# Patient Record
Sex: FEMALE | Race: WHITE | NOT HISPANIC OR LATINO | Employment: FULL TIME | URBAN - METROPOLITAN AREA
[De-identification: names, ages, dates, MRNs, and addresses within clinical notes are randomized per-mention and may not be internally consistent; named-entity substitution may affect disease eponyms.]

---

## 2018-09-27 ENCOUNTER — APPOINTMENT (OUTPATIENT)
Dept: LAB | Facility: MEDICAL CENTER | Age: 63
End: 2018-09-27
Payer: COMMERCIAL

## 2018-09-27 ENCOUNTER — TRANSCRIBE ORDERS (OUTPATIENT)
Dept: ADMINISTRATIVE | Facility: HOSPITAL | Age: 63
End: 2018-09-27

## 2018-09-27 ENCOUNTER — HOSPITAL ENCOUNTER (OUTPATIENT)
Dept: RADIOLOGY | Facility: MEDICAL CENTER | Age: 63
Discharge: HOME | End: 2018-09-27
Payer: COMMERCIAL

## 2018-09-27 DIAGNOSIS — R63.5 ABNORMAL WEIGHT GAIN: ICD-10-CM

## 2018-09-27 DIAGNOSIS — R05.9 COUGH: Primary | ICD-10-CM

## 2018-09-27 DIAGNOSIS — R05.9 COUGH: ICD-10-CM

## 2018-09-27 DIAGNOSIS — R63.4 LOSS OF WEIGHT: Primary | ICD-10-CM

## 2018-09-27 LAB
ALBUMIN SERPL BCP-MCNC: 3.9 G/DL (ref 3.5–5)
ALP SERPL-CCNC: 87 U/L (ref 46–116)
ALT SERPL W P-5'-P-CCNC: 84 U/L (ref 12–78)
ANION GAP SERPL CALCULATED.3IONS-SCNC: 2 MMOL/L (ref 4–13)
AST SERPL W P-5'-P-CCNC: 65 U/L (ref 5–45)
BILIRUB SERPL-MCNC: 0.4 MG/DL (ref 0.2–1)
BUN SERPL-MCNC: 15 MG/DL (ref 5–25)
CALCIUM SERPL-MCNC: 9.4 MG/DL (ref 8.3–10.1)
CHLORIDE SERPL-SCNC: 104 MMOL/L (ref 100–108)
CO2 SERPL-SCNC: 30 MMOL/L (ref 21–32)
CREAT SERPL-MCNC: 0.75 MG/DL (ref 0.6–1.3)
ERYTHROCYTE [DISTWIDTH] IN BLOOD BY AUTOMATED COUNT: 14.4 % (ref 11.6–15.1)
ERYTHROCYTE [SEDIMENTATION RATE] IN BLOOD: 16 MM/HOUR (ref 0–20)
EST. AVERAGE GLUCOSE BLD GHB EST-MCNC: 126 MG/DL
GFR SERPL CREATININE-BSD FRML MDRD: 86 ML/MIN/1.73SQ M
GLUCOSE P FAST SERPL-MCNC: 104 MG/DL (ref 65–99)
HBA1C MFR BLD: 6 % (ref 4.2–6.3)
HCT VFR BLD AUTO: 46.3 % (ref 34.8–46.1)
HGB BLD-MCNC: 15.3 G/DL (ref 11.5–15.4)
LDH SERPL-CCNC: 281 U/L (ref 81–234)
MCH RBC QN AUTO: 29.6 PG (ref 26.8–34.3)
MCHC RBC AUTO-ENTMCNC: 33 G/DL (ref 31.4–37.4)
MCV RBC AUTO: 90 FL (ref 82–98)
PLATELET # BLD AUTO: 226 THOUSANDS/UL (ref 149–390)
PMV BLD AUTO: 10.8 FL (ref 8.9–12.7)
POTASSIUM SERPL-SCNC: 5.4 MMOL/L (ref 3.5–5.3)
PROT SERPL-MCNC: 7.7 G/DL (ref 6.4–8.2)
RBC # BLD AUTO: 5.17 MILLION/UL (ref 3.81–5.12)
SODIUM SERPL-SCNC: 136 MMOL/L (ref 136–145)
WBC # BLD AUTO: 4.61 THOUSAND/UL (ref 4.31–10.16)

## 2018-09-27 PROCEDURE — 85027 COMPLETE CBC AUTOMATED: CPT | Performed by: INTERNAL MEDICINE

## 2018-09-27 PROCEDURE — 36415 COLL VENOUS BLD VENIPUNCTURE: CPT | Performed by: INTERNAL MEDICINE

## 2018-09-27 PROCEDURE — 83615 LACTATE (LD) (LDH) ENZYME: CPT | Performed by: INTERNAL MEDICINE

## 2018-09-27 PROCEDURE — 85652 RBC SED RATE AUTOMATED: CPT | Performed by: INTERNAL MEDICINE

## 2018-09-27 PROCEDURE — 71250 CT THORAX DX C-: CPT

## 2018-09-27 PROCEDURE — 83036 HEMOGLOBIN GLYCOSYLATED A1C: CPT | Performed by: INTERNAL MEDICINE

## 2018-09-27 PROCEDURE — 80053 COMPREHEN METABOLIC PANEL: CPT | Performed by: INTERNAL MEDICINE

## 2018-09-27 PROCEDURE — 82232 ASSAY OF BETA-2 PROTEIN: CPT | Performed by: INTERNAL MEDICINE

## 2018-09-28 LAB — B2 MICROGLOB SERPL-MCNC: 2.2 MG/L (ref 0.6–2.4)

## 2023-07-14 ENCOUNTER — APPOINTMENT (EMERGENCY)
Dept: CT IMAGING | Facility: HOSPITAL | Age: 68
DRG: 210 | End: 2023-07-14
Payer: OTHER MISCELLANEOUS

## 2023-07-14 ENCOUNTER — OCCMED (OUTPATIENT)
Dept: URGENT CARE | Facility: MEDICAL CENTER | Age: 68
End: 2023-07-14
Payer: OTHER MISCELLANEOUS

## 2023-07-14 ENCOUNTER — APPOINTMENT (OUTPATIENT)
Dept: RADIOLOGY | Facility: MEDICAL CENTER | Age: 68
End: 2023-07-14
Payer: OTHER MISCELLANEOUS

## 2023-07-14 ENCOUNTER — APPOINTMENT (INPATIENT)
Dept: MRI IMAGING | Facility: HOSPITAL | Age: 68
DRG: 210 | End: 2023-07-14
Payer: OTHER MISCELLANEOUS

## 2023-07-14 ENCOUNTER — HOSPITAL ENCOUNTER (INPATIENT)
Facility: HOSPITAL | Age: 68
LOS: 6 days | DRG: 210 | End: 2023-07-20
Attending: EMERGENCY MEDICINE | Admitting: SURGERY
Payer: OTHER MISCELLANEOUS

## 2023-07-14 DIAGNOSIS — S72.115A CLOSED NONDISPLACED FRACTURE OF GREATER TROCHANTER OF LEFT FEMUR, INITIAL ENCOUNTER (HCC): ICD-10-CM

## 2023-07-14 DIAGNOSIS — S69.92XA LEFT WRIST INJURY, INITIAL ENCOUNTER: ICD-10-CM

## 2023-07-14 DIAGNOSIS — Z01.818 PREOPERATIVE CLEARANCE: ICD-10-CM

## 2023-07-14 DIAGNOSIS — S79.912A HIP INJURY, LEFT, INITIAL ENCOUNTER: ICD-10-CM

## 2023-07-14 DIAGNOSIS — S72.113A GREATER TROCHANTER FRACTURE (HCC): Primary | ICD-10-CM

## 2023-07-14 DIAGNOSIS — R29.898 LEFT LEG WEAKNESS: ICD-10-CM

## 2023-07-14 DIAGNOSIS — R29.898 WEAKNESS OF LEFT LOWER EXTREMITY: ICD-10-CM

## 2023-07-14 DIAGNOSIS — J44.9 CHRONIC OBSTRUCTIVE PULMONARY DISEASE, UNSPECIFIED COPD TYPE (HCC): ICD-10-CM

## 2023-07-14 DIAGNOSIS — S79.912A HIP INJURY, LEFT, INITIAL ENCOUNTER: Primary | ICD-10-CM

## 2023-07-14 DIAGNOSIS — S69.92XA HAND INJURY, LEFT, INITIAL ENCOUNTER: ICD-10-CM
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
## 2023-07-14 LAB
ANION GAP SERPL CALCULATED.3IONS-SCNC: 6 MMOL/L
APTT PPP: 31 SECONDS (ref 23–37)
BASOPHILS # BLD AUTO: 0.06 THOUSANDS/ÂΜL (ref 0–0.1)
BASOPHILS NFR BLD AUTO: 1 % (ref 0–1)
BUN SERPL-MCNC: 18 MG/DL (ref 5–25)
CALCIUM SERPL-MCNC: 8.9 MG/DL (ref 8.4–10.2)
CHLORIDE SERPL-SCNC: 101 MMOL/L (ref 96–108)
CO2 SERPL-SCNC: 29 MMOL/L (ref 21–32)
CREAT SERPL-MCNC: 0.64 MG/DL (ref 0.6–1.3)
EOSINOPHIL # BLD AUTO: 0.11 THOUSAND/ÂΜL (ref 0–0.61)
EOSINOPHIL NFR BLD AUTO: 1 % (ref 0–6)
ERYTHROCYTE [DISTWIDTH] IN BLOOD BY AUTOMATED COUNT: 14.4 % (ref 11.6–15.1)
GFR SERPL CREATININE-BSD FRML MDRD: 93 ML/MIN/1.73SQ M
GLUCOSE SERPL-MCNC: 218 MG/DL (ref 65–140)
HCT VFR BLD AUTO: 44.1 % (ref 34.8–46.1)
HGB BLD-MCNC: 14.7 G/DL (ref 11.5–15.4)
IMM GRANULOCYTES # BLD AUTO: 0.03 THOUSAND/UL (ref 0–0.2)
IMM GRANULOCYTES NFR BLD AUTO: 0 % (ref 0–2)
INR PPP: 1.04 (ref 0.84–1.19)
LYMPHOCYTES # BLD AUTO: 1.17 THOUSANDS/ÂΜL (ref 0.6–4.47)
LYMPHOCYTES NFR BLD AUTO: 15 % (ref 14–44)
MCH RBC QN AUTO: 30 PG (ref 26.8–34.3)
MCHC RBC AUTO-ENTMCNC: 33.3 G/DL (ref 31.4–37.4)
MCV RBC AUTO: 90 FL (ref 82–98)
MONOCYTES # BLD AUTO: 0.52 THOUSAND/ÂΜL (ref 0.17–1.22)
MONOCYTES NFR BLD AUTO: 7 % (ref 4–12)
NEUTROPHILS # BLD AUTO: 5.98 THOUSANDS/ÂΜL (ref 1.85–7.62)
NEUTS SEG NFR BLD AUTO: 76 % (ref 43–75)
NRBC BLD AUTO-RTO: 0 /100 WBCS
PLATELET # BLD AUTO: 174 THOUSANDS/UL (ref 149–390)
PMV BLD AUTO: 9.8 FL (ref 8.9–12.7)
POTASSIUM SERPL-SCNC: 3.6 MMOL/L (ref 3.5–5.3)
PROTHROMBIN TIME: 13.8 SECONDS (ref 11.6–14.5)
RBC # BLD AUTO: 4.9 MILLION/UL (ref 3.81–5.12)
SODIUM SERPL-SCNC: 136 MMOL/L (ref 135–147)
WBC # BLD AUTO: 7.87 THOUSAND/UL (ref 4.31–10.16)

## 2023-07-14 PROCEDURE — 85025 COMPLETE CBC W/AUTO DIFF WBC: CPT

## 2023-07-14 PROCEDURE — G0382 LEV 3 HOSP TYPE B ED VISIT: HCPCS | Performed by: PHYSICIAN ASSISTANT

## 2023-07-14 PROCEDURE — G1004 CDSM NDSC: HCPCS

## 2023-07-14 PROCEDURE — 80048 BASIC METABOLIC PNL TOTAL CA: CPT

## 2023-07-14 PROCEDURE — 99283 EMERGENCY DEPT VISIT LOW MDM: CPT | Performed by: PHYSICIAN ASSISTANT

## 2023-07-14 PROCEDURE — 94640 AIRWAY INHALATION TREATMENT: CPT

## 2023-07-14 PROCEDURE — 73502 X-RAY EXAM HIP UNI 2-3 VIEWS: CPT

## 2023-07-14 PROCEDURE — 73721 MRI JNT OF LWR EXTRE W/O DYE: CPT

## 2023-07-14 PROCEDURE — 73700 CT LOWER EXTREMITY W/O DYE: CPT

## 2023-07-14 PROCEDURE — 99222 1ST HOSP IP/OBS MODERATE 55: CPT | Performed by: SURGERY

## 2023-07-14 PROCEDURE — 85730 THROMBOPLASTIN TIME PARTIAL: CPT

## 2023-07-14 PROCEDURE — 36415 COLL VENOUS BLD VENIPUNCTURE: CPT

## 2023-07-14 PROCEDURE — 73110 X-RAY EXAM OF WRIST: CPT

## 2023-07-14 PROCEDURE — 99284 EMERGENCY DEPT VISIT MOD MDM: CPT

## 2023-07-14 PROCEDURE — 72158 MRI LUMBAR SPINE W/O & W/DYE: CPT

## 2023-07-14 PROCEDURE — 73130 X-RAY EXAM OF HAND: CPT

## 2023-07-14 PROCEDURE — 85610 PROTHROMBIN TIME: CPT

## 2023-07-14 PROCEDURE — 72131 CT LUMBAR SPINE W/O DYE: CPT

## 2023-07-14 PROCEDURE — A9585 GADOBUTROL INJECTION: HCPCS

## 2023-07-14 RX ORDER — SENNOSIDES 8.6 MG
2 TABLET ORAL DAILY
Status: DISCONTINUED | OUTPATIENT
Start: 2023-07-15 | End: 2023-07-20 | Stop reason: HOSPADM

## 2023-07-14 RX ORDER — ONDANSETRON 2 MG/ML
4 INJECTION INTRAMUSCULAR; INTRAVENOUS EVERY 4 HOURS PRN
Status: DISCONTINUED | OUTPATIENT
Start: 2023-07-14 | End: 2023-07-20 | Stop reason: HOSPADM

## 2023-07-14 RX ORDER — LEVALBUTEROL INHALATION SOLUTION 1.25 MG/3ML
1.25 SOLUTION RESPIRATORY (INHALATION) EVERY 4 HOURS PRN
Status: DISCONTINUED | OUTPATIENT
Start: 2023-07-14 | End: 2023-07-16

## 2023-07-14 RX ORDER — ACETAMINOPHEN 325 MG/1
975 TABLET ORAL EVERY 6 HOURS PRN
Status: DISCONTINUED | OUTPATIENT
Start: 2023-07-14 | End: 2023-07-20 | Stop reason: HOSPADM

## 2023-07-14 RX ORDER — LEVALBUTEROL INHALATION SOLUTION 1.25 MG/3ML
1.25 SOLUTION RESPIRATORY (INHALATION) ONCE
Status: COMPLETED | OUTPATIENT
Start: 2023-07-14 | End: 2023-07-14

## 2023-07-14 RX ORDER — OXYCODONE HYDROCHLORIDE 5 MG/1
5 TABLET ORAL EVERY 4 HOURS PRN
Status: DISCONTINUED | OUTPATIENT
Start: 2023-07-14 | End: 2023-07-20 | Stop reason: HOSPADM

## 2023-07-14 RX ORDER — NICOTINE 21 MG/24HR
1 PATCH, TRANSDERMAL 24 HOURS TRANSDERMAL DAILY
Status: DISCONTINUED | OUTPATIENT
Start: 2023-07-15 | End: 2023-07-15

## 2023-07-14 RX ORDER — METHOCARBAMOL 500 MG/1
500 TABLET, FILM COATED ORAL EVERY 6 HOURS PRN
Status: DISCONTINUED | OUTPATIENT
Start: 2023-07-14 | End: 2023-07-20 | Stop reason: HOSPADM

## 2023-07-14 RX ORDER — ENOXAPARIN SODIUM 100 MG/ML
30 INJECTION SUBCUTANEOUS EVERY 12 HOURS SCHEDULED
Status: DISCONTINUED | OUTPATIENT
Start: 2023-07-14 | End: 2023-07-20 | Stop reason: HOSPADM

## 2023-07-14 RX ADMIN — LEVALBUTEROL HYDROCHLORIDE 1.25 MG: 1.25 SOLUTION RESPIRATORY (INHALATION) at 19:17

## 2023-07-14 RX ADMIN — ENOXAPARIN SODIUM 30 MG: 30 INJECTION SUBCUTANEOUS at 23:42

## 2023-07-14 RX ADMIN — GADOBUTROL 5 ML: 604.72 INJECTION INTRAVENOUS at 23:25

## 2023-07-14 NOTE — ASSESSMENT & PLAN NOTE
- Left greater trochanter fracture, present on admission.  - Status post ground level mechanical fall on 7/13. - Appreciate Orthopedic surgery evaluation, recommendations and interventions as noted. - Maintain NON weightbearing status on the left lower extremity.  - Monitor left lower extremity neurovascular exam.  - Continue multimodal analgesic regimen.  - Continue DVT prophylaxis. - PT and OT evaluation and treatment as indicated. - Outpatient follow up with Orthopedic surgery for re-evaluation.

## 2023-07-15 ENCOUNTER — ANESTHESIA (INPATIENT)
Dept: PERIOP | Facility: HOSPITAL | Age: 68
DRG: 210 | End: 2023-07-15
Payer: OTHER MISCELLANEOUS

## 2023-07-15 ENCOUNTER — APPOINTMENT (INPATIENT)
Dept: RADIOLOGY | Facility: HOSPITAL | Age: 68
DRG: 210 | End: 2023-07-15
Payer: OTHER MISCELLANEOUS

## 2023-07-15 ENCOUNTER — ANESTHESIA EVENT (INPATIENT)
Dept: PERIOP | Facility: HOSPITAL | Age: 68
DRG: 210 | End: 2023-07-15
Payer: OTHER MISCELLANEOUS

## 2023-07-15 LAB
ABO GROUP BLD: NORMAL
ABO GROUP BLD: NORMAL
ANION GAP SERPL CALCULATED.3IONS-SCNC: 7 MMOL/L
BASOPHILS # BLD AUTO: 0.06 THOUSANDS/ÂΜL (ref 0–0.1)
BASOPHILS NFR BLD AUTO: 1 % (ref 0–1)
BLD GP AB SCN SERPL QL: NEGATIVE
BUN SERPL-MCNC: 24 MG/DL (ref 5–25)
CALCIUM SERPL-MCNC: 8.8 MG/DL (ref 8.4–10.2)
CHLORIDE SERPL-SCNC: 104 MMOL/L (ref 96–108)
CO2 SERPL-SCNC: 27 MMOL/L (ref 21–32)
CREAT SERPL-MCNC: 0.54 MG/DL (ref 0.6–1.3)
EOSINOPHIL # BLD AUTO: 0.17 THOUSAND/ÂΜL (ref 0–0.61)
EOSINOPHIL NFR BLD AUTO: 2 % (ref 0–6)
ERYTHROCYTE [DISTWIDTH] IN BLOOD BY AUTOMATED COUNT: 14.6 % (ref 11.6–15.1)
GFR SERPL CREATININE-BSD FRML MDRD: 99 ML/MIN/1.73SQ M
GLUCOSE SERPL-MCNC: 96 MG/DL (ref 65–140)
HCT VFR BLD AUTO: 44.9 % (ref 34.8–46.1)
HGB BLD-MCNC: 14.8 G/DL (ref 11.5–15.4)
IMM GRANULOCYTES # BLD AUTO: 0.02 THOUSAND/UL (ref 0–0.2)
IMM GRANULOCYTES NFR BLD AUTO: 0 % (ref 0–2)
LYMPHOCYTES # BLD AUTO: 1.23 THOUSANDS/ÂΜL (ref 0.6–4.47)
LYMPHOCYTES NFR BLD AUTO: 16 % (ref 14–44)
MCH RBC QN AUTO: 30.1 PG (ref 26.8–34.3)
MCHC RBC AUTO-ENTMCNC: 33 G/DL (ref 31.4–37.4)
MCV RBC AUTO: 91 FL (ref 82–98)
MONOCYTES # BLD AUTO: 0.62 THOUSAND/ÂΜL (ref 0.17–1.22)
MONOCYTES NFR BLD AUTO: 8 % (ref 4–12)
NEUTROPHILS # BLD AUTO: 5.38 THOUSANDS/ÂΜL (ref 1.85–7.62)
NEUTS SEG NFR BLD AUTO: 73 % (ref 43–75)
NRBC BLD AUTO-RTO: 0 /100 WBCS
PLATELET # BLD AUTO: 179 THOUSANDS/UL (ref 149–390)
PMV BLD AUTO: 10.4 FL (ref 8.9–12.7)
POTASSIUM SERPL-SCNC: 3.6 MMOL/L (ref 3.5–5.3)
RBC # BLD AUTO: 4.92 MILLION/UL (ref 3.81–5.12)
RH BLD: POSITIVE
RH BLD: POSITIVE
SODIUM SERPL-SCNC: 138 MMOL/L (ref 135–147)
SPECIMEN EXPIRATION DATE: NORMAL
WBC # BLD AUTO: 7.48 THOUSAND/UL (ref 4.31–10.16)

## 2023-07-15 PROCEDURE — 99232 SBSQ HOSP IP/OBS MODERATE 35: CPT | Performed by: SURGERY

## 2023-07-15 PROCEDURE — 99222 1ST HOSP IP/OBS MODERATE 55: CPT | Performed by: INTERNAL MEDICINE

## 2023-07-15 PROCEDURE — 80048 BASIC METABOLIC PNL TOTAL CA: CPT

## 2023-07-15 PROCEDURE — 71045 X-RAY EXAM CHEST 1 VIEW: CPT

## 2023-07-15 PROCEDURE — 86850 RBC ANTIBODY SCREEN: CPT

## 2023-07-15 PROCEDURE — 85025 COMPLETE CBC W/AUTO DIFF WBC: CPT

## 2023-07-15 PROCEDURE — C1713 ANCHOR/SCREW BN/BN,TIS/BN: HCPCS | Performed by: ORTHOPAEDIC SURGERY

## 2023-07-15 PROCEDURE — NC001 PR NO CHARGE: Performed by: SURGERY

## 2023-07-15 PROCEDURE — 99222 1ST HOSP IP/OBS MODERATE 55: CPT | Performed by: ORTHOPAEDIC SURGERY

## 2023-07-15 PROCEDURE — 99222 1ST HOSP IP/OBS MODERATE 55: CPT | Performed by: PHYSICIAN ASSISTANT

## 2023-07-15 PROCEDURE — 27245 TREAT THIGH FRACTURE: CPT

## 2023-07-15 PROCEDURE — 86900 BLOOD TYPING SEROLOGIC ABO: CPT

## 2023-07-15 PROCEDURE — 27245 TREAT THIGH FRACTURE: CPT | Performed by: ORTHOPAEDIC SURGERY

## 2023-07-15 PROCEDURE — 86901 BLOOD TYPING SEROLOGIC RH(D): CPT

## 2023-07-15 PROCEDURE — 94760 N-INVAS EAR/PLS OXIMETRY 1: CPT

## 2023-07-15 PROCEDURE — 94640 AIRWAY INHALATION TREATMENT: CPT

## 2023-07-15 PROCEDURE — C1769 GUIDE WIRE: HCPCS | Performed by: ORTHOPAEDIC SURGERY

## 2023-07-15 PROCEDURE — 0QH706Z INSERTION OF INTRAMEDULLARY INTERNAL FIXATION DEVICE INTO LEFT UPPER FEMUR, OPEN APPROACH: ICD-10-PCS | Performed by: ORTHOPAEDIC SURGERY

## 2023-07-15 PROCEDURE — 73502 X-RAY EXAM HIP UNI 2-3 VIEWS: CPT

## 2023-07-15 DEVICE — LOCKING SCREW FOR IM NAIL Ø 5MM/ 36MM/ XL25/ STERILE: Type: IMPLANTABLE DEVICE | Site: FEMUR | Status: FUNCTIONAL

## 2023-07-15 DEVICE — TFNA FENESTRATED HELICAL BLADE 85MM - STERILE
Type: IMPLANTABLE DEVICE | Site: HIP | Status: FUNCTIONAL
Brand: TFN-ADVANCE

## 2023-07-15 DEVICE — 11MM/130 DEG TI CANN TFNA 170MM - STERILE
Type: IMPLANTABLE DEVICE | Site: FEMUR | Status: FUNCTIONAL
Brand: TFN-ADVANCE

## 2023-07-15 RX ORDER — MAGNESIUM HYDROXIDE 1200 MG/15ML
LIQUID ORAL AS NEEDED
Status: DISCONTINUED | OUTPATIENT
Start: 2023-07-15 | End: 2023-07-15 | Stop reason: HOSPADM

## 2023-07-15 RX ORDER — LIDOCAINE HYDROCHLORIDE 10 MG/ML
INJECTION, SOLUTION EPIDURAL; INFILTRATION; INTRACAUDAL; PERINEURAL AS NEEDED
Status: DISCONTINUED | OUTPATIENT
Start: 2023-07-15 | End: 2023-07-15

## 2023-07-15 RX ORDER — FENTANYL CITRATE/PF 50 MCG/ML
25 SYRINGE (ML) INJECTION
Status: DISCONTINUED | OUTPATIENT
Start: 2023-07-15 | End: 2023-07-15

## 2023-07-15 RX ORDER — CEFAZOLIN SODIUM 1 G/50ML
SOLUTION INTRAVENOUS AS NEEDED
Status: DISCONTINUED | OUTPATIENT
Start: 2023-07-15 | End: 2023-07-15

## 2023-07-15 RX ORDER — SODIUM CHLORIDE, SODIUM LACTATE, POTASSIUM CHLORIDE, CALCIUM CHLORIDE 600; 310; 30; 20 MG/100ML; MG/100ML; MG/100ML; MG/100ML
INJECTION, SOLUTION INTRAVENOUS CONTINUOUS PRN
Status: DISCONTINUED | OUTPATIENT
Start: 2023-07-15 | End: 2023-07-15

## 2023-07-15 RX ORDER — CEFAZOLIN SODIUM 2 G/50ML
2000 SOLUTION INTRAVENOUS EVERY 8 HOURS
Status: DISCONTINUED | OUTPATIENT
Start: 2023-07-15 | End: 2023-07-20 | Stop reason: HOSPADM

## 2023-07-15 RX ORDER — ROCURONIUM BROMIDE 10 MG/ML
INJECTION, SOLUTION INTRAVENOUS AS NEEDED
Status: DISCONTINUED | OUTPATIENT
Start: 2023-07-15 | End: 2023-07-15

## 2023-07-15 RX ORDER — CEFAZOLIN SODIUM 1 G/50ML
1000 SOLUTION INTRAVENOUS EVERY 8 HOURS
Status: COMPLETED | OUTPATIENT
Start: 2023-07-15 | End: 2023-07-16

## 2023-07-15 RX ORDER — LEVALBUTEROL INHALATION SOLUTION 1.25 MG/3ML
1.25 SOLUTION RESPIRATORY (INHALATION)
Status: DISCONTINUED | OUTPATIENT
Start: 2023-07-15 | End: 2023-07-15

## 2023-07-15 RX ORDER — FENTANYL CITRATE 50 UG/ML
INJECTION, SOLUTION INTRAMUSCULAR; INTRAVENOUS AS NEEDED
Status: DISCONTINUED | OUTPATIENT
Start: 2023-07-15 | End: 2023-07-15

## 2023-07-15 RX ORDER — TRANEXAMIC ACID 10 MG/ML
1000 INJECTION, SOLUTION INTRAVENOUS
Status: COMPLETED | OUTPATIENT
Start: 2023-07-16 | End: 2023-07-15

## 2023-07-15 RX ORDER — ONDANSETRON 2 MG/ML
INJECTION INTRAMUSCULAR; INTRAVENOUS AS NEEDED
Status: DISCONTINUED | OUTPATIENT
Start: 2023-07-15 | End: 2023-07-15

## 2023-07-15 RX ORDER — PROPOFOL 10 MG/ML
INJECTION, EMULSION INTRAVENOUS AS NEEDED
Status: DISCONTINUED | OUTPATIENT
Start: 2023-07-15 | End: 2023-07-15

## 2023-07-15 RX ORDER — ONDANSETRON 2 MG/ML
4 INJECTION INTRAMUSCULAR; INTRAVENOUS ONCE AS NEEDED
Status: COMPLETED | OUTPATIENT
Start: 2023-07-15 | End: 2023-07-15

## 2023-07-15 RX ADMIN — TRANEXAMIC ACID 1000 MG: 10 INJECTION, SOLUTION INTRAVENOUS at 14:02

## 2023-07-15 RX ADMIN — CEFAZOLIN SODIUM 2000 MG: 2 SOLUTION INTRAVENOUS at 22:06

## 2023-07-15 RX ADMIN — ONDANSETRON 4 MG: 2 INJECTION INTRAMUSCULAR; INTRAVENOUS at 15:18

## 2023-07-15 RX ADMIN — CEFAZOLIN SODIUM 1000 MG: 1 SOLUTION INTRAVENOUS at 22:06

## 2023-07-15 RX ADMIN — SODIUM CHLORIDE, SODIUM LACTATE, POTASSIUM CHLORIDE, AND CALCIUM CHLORIDE: .6; .31; .03; .02 INJECTION, SOLUTION INTRAVENOUS at 13:51

## 2023-07-15 RX ADMIN — FENTANYL CITRATE 25 MCG: 50 INJECTION INTRAMUSCULAR; INTRAVENOUS at 15:11

## 2023-07-15 RX ADMIN — LEVALBUTEROL HYDROCHLORIDE 1.25 MG: 1.25 SOLUTION RESPIRATORY (INHALATION) at 09:46

## 2023-07-15 RX ADMIN — IPRATROPIUM BROMIDE 0.5 MG: 0.5 SOLUTION RESPIRATORY (INHALATION) at 09:46

## 2023-07-15 RX ADMIN — ONDANSETRON 4 MG: 2 INJECTION INTRAMUSCULAR; INTRAVENOUS at 14:37

## 2023-07-15 RX ADMIN — PHENYLEPHRINE HYDROCHLORIDE 30 MCG/MIN: 10 INJECTION INTRAVENOUS at 14:07

## 2023-07-15 RX ADMIN — FENTANYL CITRATE 25 MCG: 50 INJECTION INTRAMUSCULAR; INTRAVENOUS at 13:52

## 2023-07-15 RX ADMIN — FENTANYL CITRATE 25 MCG: 50 INJECTION INTRAMUSCULAR; INTRAVENOUS at 15:03

## 2023-07-15 RX ADMIN — OXYCODONE HYDROCHLORIDE 5 MG: 5 TABLET ORAL at 17:28

## 2023-07-15 RX ADMIN — FENTANYL CITRATE 25 MCG: 50 INJECTION INTRAMUSCULAR; INTRAVENOUS at 14:16

## 2023-07-15 RX ADMIN — CEFAZOLIN SODIUM 1000 MG: 1 SOLUTION INTRAVENOUS at 13:58

## 2023-07-15 RX ADMIN — ENOXAPARIN SODIUM 30 MG: 30 INJECTION SUBCUTANEOUS at 22:05

## 2023-07-15 RX ADMIN — FENTANYL CITRATE 25 MCG: 50 INJECTION INTRAMUSCULAR; INTRAVENOUS at 14:58

## 2023-07-15 RX ADMIN — SUGAMMADEX 100 MG: 100 INJECTION, SOLUTION INTRAVENOUS at 14:47

## 2023-07-15 RX ADMIN — ROCURONIUM BROMIDE 30 MG: 10 INJECTION, SOLUTION INTRAVENOUS at 13:55

## 2023-07-15 RX ADMIN — LIDOCAINE HYDROCHLORIDE 50 MG: 10 INJECTION, SOLUTION EPIDURAL; INFILTRATION; INTRACAUDAL at 13:55

## 2023-07-15 RX ADMIN — PROPOFOL 100 MG: 10 INJECTION, EMULSION INTRAVENOUS at 13:55

## 2023-07-15 RX ADMIN — FENTANYL CITRATE 25 MCG: 50 INJECTION INTRAMUSCULAR; INTRAVENOUS at 14:27

## 2023-07-15 NOTE — PLAN OF CARE
Problem: MOBILITY - ADULT  Goal: Maintain or return to baseline ADL function  Description: INTERVENTIONS:  -  Assess patient's ability to carry out ADLs; assess patient's baseline for ADL function and identify physical deficits which impact ability to perform ADLs (bathing, care of mouth/teeth, toileting, grooming, dressing, etc.)  - Assess/evaluate cause of self-care deficits   - Assess range of motion  - Assess patient's mobility; develop plan if impaired  - Assess patient's need for assistive devices and provide as appropriate  - Encourage maximum independence but intervene and supervise when necessary  - Involve family in performance of ADLs  - Assess for home care needs following discharge   - Consider OT consult to assist with ADL evaluation and planning for discharge  - Provide patient education as appropriate  Outcome: Progressing  Goal: Maintains/Returns to pre admission functional level  Description: INTERVENTIONS:  - Perform BMAT or MOVE assessment daily.   - Set and communicate daily mobility goal to care team and patient/family/caregiver. - Collaborate with rehabilitation services on mobility goals if consulted  - Perform Range of Motion  times a day. - Reposition patient every  hours.   - Dangle patient  times a day  - Stand patient  times a day  - Ambulate patient  times a day  - Out of bed to chair  times a day   - Out of bed for meals  times a day  - Out of bed for toileting  - Record patient progress and toleration of activity level   Outcome: Progressing     Problem: PAIN - ADULT  Goal: Verbalizes/displays adequate comfort level or baseline comfort level  Description: Interventions:  - Encourage patient to monitor pain and request assistance  - Assess pain using appropriate pain scale  - Administer analgesics based on type and severity of pain and evaluate response  - Implement non-pharmacological measures as appropriate and evaluate response  - Consider cultural and social influences on pain and pain management  - Notify physician/advanced practitioner if interventions unsuccessful or patient reports new pain  Outcome: Progressing     Problem: INFECTION - ADULT  Goal: Absence or prevention of progression during hospitalization  Description: INTERVENTIONS:  - Assess and monitor for signs and symptoms of infection  - Monitor lab/diagnostic results  - Monitor all insertion sites, i.e. indwelling lines, tubes, and drains  - Monitor endotracheal if appropriate and nasal secretions for changes in amount and color  - [place] appropriate cooling/warming therapies per order  - Administer medications as ordered  - Instruct and encourage patient and family to use good hand hygiene technique  - Identify and instruct in appropriate isolation precautions for identified infection/condition  Outcome: Progressing  Goal: Absence of fever/infection during neutropenic period  Description: INTERVENTIONS:  - Monitor WBC    Outcome: Progressing     Problem: SAFETY ADULT  Goal: Maintain or return to baseline ADL function  Description: INTERVENTIONS:  -  Assess patient's ability to carry out ADLs; assess patient's baseline for ADL function and identify physical deficits which impact ability to perform ADLs (bathing, care of mouth/teeth, toileting, grooming, dressing, etc.)  - Assess/evaluate cause of self-care deficits   - Assess range of motion  - Assess patient's mobility; develop plan if impaired  - Assess patient's need for assistive devices and provide as appropriate  - Encourage maximum independence but intervene and supervise when necessary  - Involve family in performance of ADLs  - Assess for home care needs following discharge   - Consider OT consult to assist with ADL evaluation and planning for discharge  - Provide patient education as appropriate  Outcome: Progressing  Goal: Maintains/Returns to pre admission functional level  Description: INTERVENTIONS:  - Perform BMAT or MOVE assessment daily.   - Set and communicate daily mobility goal to care team and patient/family/caregiver. - Collaborate with rehabilitation services on mobility goals if consulted  - Perform Range of Motion  times a day. - Reposition patient every  hours.   - Dangle patient  times a day  - Stand patient  times a day  - Ambulate patient  times a day  - Out of bed to chair  times a day   - Out of bed for meals  times a day  - Out of bed for toileting  - Record patient progress and toleration of activity level   Outcome: Progressing  Goal: Patient will remain free of falls  Description: INTERVENTIONS:  - Educate patient/family on patient safety including physical limitations  - Instruct patient to call for assistance with activity   - Consult OT/PT to assist with strengthening/mobility   - Keep Call bell within reach  - Keep bed low and locked with side rails adjusted as appropriate  - Keep care items and personal belongings within reach  - Initiate and maintain comfort rounds  - Make Fall Risk Sign visible to staff  - Offer Toileting every  Hours, in advance of need  - Initiate/Maintain alarm  - Obtain necessary fall risk management equipment:   - Apply yellow socks and bracelet for high fall risk patients  - Consider moving patient to room near nurses station  Outcome: Progressing     Problem: DISCHARGE PLANNING  Goal: Discharge to home or other facility with appropriate resources  Description: INTERVENTIONS:  - Identify barriers to discharge w/patient and caregiver  - Arrange for needed discharge resources and transportation as appropriate  - Identify discharge learning needs (meds, wound care, etc.)  - Arrange for interpretive services to assist at discharge as needed  - Refer to Case Management Department for coordinating discharge planning if the patient needs post-hospital services based on physician/advanced practitioner order or complex needs related to functional status, cognitive ability, or social support system  Outcome: Progressing Problem: Knowledge Deficit  Goal: Patient/family/caregiver demonstrates understanding of disease process, treatment plan, medications, and discharge instructions  Description: Complete learning assessment and assess knowledge base.   Interventions:  - Provide teaching at level of understanding  - Provide teaching via preferred learning methods  Outcome: Progressing

## 2023-07-15 NOTE — PLAN OF CARE
Problem: MOBILITY - ADULT  Goal: Maintain or return to baseline ADL function  Description: INTERVENTIONS:  -  Assess patient's ability to carry out ADLs; assess patient's baseline for ADL function and identify physical deficits which impact ability to perform ADLs (bathing, care of mouth/teeth, toileting, grooming, dressing, etc.)  - Assess/evaluate cause of self-care deficits   - Assess range of motion  - Assess patient's mobility; develop plan if impaired  - Assess patient's need for assistive devices and provide as appropriate  - Encourage maximum independence but intervene and supervise when necessary  - Involve family in performance of ADLs  - Assess for home care needs following discharge   - Consider OT consult to assist with ADL evaluation and planning for discharge  - Provide patient education as appropriate  Outcome: Progressing  Goal: Maintains/Returns to pre admission functional level  Description: INTERVENTIONS:  - Perform BMAT or MOVE assessment daily.   - Set and communicate daily mobility goal to care team and patient/family/caregiver. - Collaborate with rehabilitation services on mobility goals if consulted  - Perform Range of Motion  times a day. - Reposition patient every  hours.   - Dangle patient  times a day  - Stand patient  times a day  - Ambulate patient  times a day  - Out of bed to chair  times a day   - Out of bed for meals  times a day  - Out of bed for toileting  - Record patient progress and toleration of activity level   Outcome: Progressing     Problem: PAIN - ADULT  Goal: Verbalizes/displays adequate comfort level or baseline comfort level  Description: Interventions:  - Encourage patient to monitor pain and request assistance  - Assess pain using appropriate pain scale  - Administer analgesics based on type and severity of pain and evaluate response  - Implement non-pharmacological measures as appropriate and evaluate response  - Consider cultural and social influences on pain and pain management  - Notify physician/advanced practitioner if interventions unsuccessful or patient reports new pain  Outcome: Progressing     Problem: INFECTION - ADULT  Goal: Absence or prevention of progression during hospitalization  Description: INTERVENTIONS:  - Assess and monitor for signs and symptoms of infection  - Monitor lab/diagnostic results  - Monitor all insertion sites, i.e. indwelling lines, tubes, and drains  - Monitor endotracheal if appropriate and nasal secretions for changes in amount and color  - [place] appropriate cooling/warming therapies per order  - Administer medications as ordered  - Instruct and encourage patient and family to use good hand hygiene technique  - Identify and instruct in appropriate isolation precautions for identified infection/condition  Outcome: Progressing  Goal: Absence of fever/infection during neutropenic period  Description: INTERVENTIONS:  - Monitor WBC    Outcome: Progressing     Problem: SAFETY ADULT  Goal: Maintain or return to baseline ADL function  Description: INTERVENTIONS:  -  Assess patient's ability to carry out ADLs; assess patient's baseline for ADL function and identify physical deficits which impact ability to perform ADLs (bathing, care of mouth/teeth, toileting, grooming, dressing, etc.)  - Assess/evaluate cause of self-care deficits   - Assess range of motion  - Assess patient's mobility; develop plan if impaired  - Assess patient's need for assistive devices and provide as appropriate  - Encourage maximum independence but intervene and supervise when necessary  - Involve family in performance of ADLs  - Assess for home care needs following discharge   - Consider OT consult to assist with ADL evaluation and planning for discharge  - Provide patient education as appropriate  Outcome: Progressing  Goal: Maintains/Returns to pre admission functional level  Description: INTERVENTIONS:  - Perform BMAT or MOVE assessment daily.   - Set and communicate daily mobility goal to care team and patient/family/caregiver. - Collaborate with rehabilitation services on mobility goals if consulted  - Perform Range of Motion  times a day. - Reposition patient every  hours.   - Dangle patient  times a day  - Stand patient  times a day  - Ambulate patient  times a day  - Out of bed to chair  times a day   - Out of bed for meals  times a day  - Out of bed for toileting  - Record patient progress and toleration of activity level   Outcome: Progressing  Goal: Patient will remain free of falls  Description: INTERVENTIONS:  - Educate patient/family on patient safety including physical limitations  - Instruct patient to call for assistance with activity   - Consult OT/PT to assist with strengthening/mobility   - Keep Call bell within reach  - Keep bed low and locked with side rails adjusted as appropriate  - Keep care items and personal belongings within reach  - Initiate and maintain comfort rounds  - Make Fall Risk Sign visible to staff  - Offer Toileting every  Hours, in advance of need  - Initiate/Maintain alarm  - Obtain necessary fall risk management equipment:  - Apply yellow socks and bracelet for high fall risk patients  - Consider moving patient to room near nurses station  Outcome: Progressing     Problem: DISCHARGE PLANNING  Goal: Discharge to home or other facility with appropriate resources  Description: INTERVENTIONS:  - Identify barriers to discharge w/patient and caregiver  - Arrange for needed discharge resources and transportation as appropriate  - Identify discharge learning needs (meds, wound care, etc.)  - Arrange for interpretive services to assist at discharge as needed  - Refer to Case Management Department for coordinating discharge planning if the patient needs post-hospital services based on physician/advanced practitioner order or complex needs related to functional status, cognitive ability, or social support system  Outcome: Progressing Problem: Knowledge Deficit  Goal: Patient/family/caregiver demonstrates understanding of disease process, treatment plan, medications, and discharge instructions  Description: Complete learning assessment and assess knowledge base.   Interventions:  - Provide teaching at level of understanding  - Provide teaching via preferred learning methods  Outcome: Progressing

## 2023-07-15 NOTE — UTILIZATION REVIEW
Initial Clinical Review    Admission: Date/Time/Statement:   Admission Orders (From admission, onward)     Ordered        07/14/23 2018  Inpatient Admission  Once                      Orders Placed This Encounter   Procedures   • Inpatient Admission     Standing Status:   Standing     Number of Occurrences:   1     Order Specific Question:   Level of Care     Answer:   Med Surg [16]     Order Specific Question:   Estimated length of stay     Answer:   More than 2 Midnights     Order Specific Question:   Certification     Answer:   I certify that inpatient services are medically necessary for this patient for a duration of greater than two midnights. See H&P and MD Progress Notes for additional information about the patient's course of treatment. ED Arrival Information     Expected   -    Arrival   7/14/2023 12:44    Acuity   Urgent            Means of arrival   Walk-In    Escorted by   Family Member    Service   Trauma    Admission type   Emergency            Arrival complaint   Hip Pain, Doctor sent her here for CAT-Scan           Chief Complaint   Patient presents with   • Hip Pain     PT presents to ED after a fall yesterday and c/o left wrist pain (xrays = badly bruised) and hip appears to not be broke, but was sent here for a CT (PT was provided a script)       Initial Presentation: 74 y.o. female from home to ED, per PCP, admitted inpatient due to  Fall/fracture of greater trochanter left femur/weakness of LLE/COPD. Presented due to left hip pain starting day prior to arrival after a fall, left wrist pain. Seen at urgent care and possible fracture of left greater trochanter and sent to ED. No wrist fracture. Has had LLE weakness the past year, feels inocente at times causing falls. On exam:  Tenderness over left greater trochanter. 2 out of 5 strength left hip flexor, 2/5 strength left knee extension. 2/5 strength left foot dorsiflexion, 3 out of 5 left foot plantarflexion. Glucose 218.    Ct lumbar spine showed degenerative changes. Ct LLE showed fracture of left femur greater trochanter. In the ED sat to 86% room air and placed on oxygen and given neb. Plan is consult Geriatrics, PT/OT. Consult Orthopedics. Pain control. Consult neurosurgery. Continue nebs and consult Pulmonary. Date: 7/15/23  Day 2:  Has left wrist pain and ecchymosis, left hip pain. On exam: Skin intact small amount remote ecchymosis over left aspect of the greater trochanter, limb shortened and externally rotated, tender to palpation left hip.  some mild ecchymosis over the left wrist at the base of the thumb and  some tenderness. Continue nebs. Operative intervention. Pain control       7/15/23 per pulmonary - patient with COPD, unknown severity not in exacerbation/acute hypoxic respiratory failure/active smoker. Patient is cleared for surgery. Recommend atrovent/xopenex q8h, should have an additional treatment 30 minutes prior to intubation. Nicotine patch. CxR.     7/15/23 per Orthopedics: patient is status post fall with left intertrochanteric fracture. Plan is non weight bearing LLE, analgesia as needed. NPO. OR for IM nail femur fracture. Splint left wrist.  Wbat as tolerated left wrist.  Spine follow up.       Procedure 7/15/23 Left - INSERTION NAIL IM FEMUR ANTEGRADE (TROCHANTERIC)    ED Triage Vitals [07/14/23 1313]   Temperature Pulse Respirations Blood Pressure SpO2   98.6 °F (37 °C) 95 18 (!) 168/108 95 %      Temp Source Heart Rate Source Patient Position - Orthostatic VS BP Location FiO2 (%)   Oral Monitor Sitting Right arm --      Pain Score       8          Wt Readings from Last 1 Encounters:   07/14/23 47.6 kg (105 lb)     Additional Vital Signs:   07/15/23 07:16:35 98.5 °F (36.9 °C) 109 Abnormal  18 140/96 111 91 % -- -- -- --   07/14/23 2339 -- -- -- -- -- 90 % 28 2 L/min Nasal cannula --   07/14/23 2227 -- -- -- -- -- -- 28 2 L/min Nasal cannula --   07/14/23 2130 98.6 °F (37 °C) 112 Abnormal  20 148/98 -- -- -- -- -- --   07/14/23 21:27:24 -- 112 Abnormal  -- 148/98 115 90 % -- -- -- --   07/14/23 1847 -- 112 Abnormal  21 179/91 Abnormal  128 92 % -- -- None (Room air) --   07/14/23 1633 -- 116 Abnormal    22 175/96 Abnormal  -- 92 % -- -- None (Room air) Sitting   Pulse: patient just returned from bathroom at 07/14/23 1633     Pertinent Labs/Diagnostic Test Results:   MRI lumbar spine w wo contrast   Final Result by Ramon San DO (07/15 8228)      Minor noncompressive lumbar degenerative change. No cauda equina or foraminal nerve impingement. Workstation performed: [id number]         CT spine lumbar without contrast   Final Result by Charu Julian MD (07/14 1918)      1. No acute osseous abnormality. 2.  Degenerative change without high-grade canal or foraminal stenosis as detailed, most pronounced at L3-4 with mild canal and foraminal narrowing.          Workstation performed: [id number]         CT lower extremity wo contrast left   Final Result by Anshul Adames MD (07/14 1636)      Acute, nondisplaced fracture of the left femur greater trochanter (series 601 images 50-64.)         Workstation performed: [id number]             Results from last 7 days   Lab Units 07/15/23  0447 07/14/23  2041   WBC Thousand/uL 7.48 7.87   HEMOGLOBIN g/dL 14.8 14.7   HEMATOCRIT % 44.9 44.1   PLATELETS Thousands/uL 179 174   NEUTROS ABS Thousands/µL 5.38 5.98     Results from last 7 days   Lab Units 07/15/23  0447 07/14/23  2041   SODIUM mmol/L 138 136   POTASSIUM mmol/L 3.6 3.6   CHLORIDE mmol/L 104 101   CO2 mmol/L 27 29   ANION GAP mmol/L 7 6   BUN mg/dL 24 18   CREATININE mg/dL 0.54* 0.64   EGFR ml/min/1.73sq m 99 93   CALCIUM mg/dL 8.8 8.9     Results from last 7 days   Lab Units 07/15/23  0447 07/14/23  2041   GLUCOSE RANDOM mg/dL 96 218*     Results from last 7 days   Lab Units 07/14/23  2041   PROTIME seconds 13.8   INR  1.04   PTT seconds 31     ED Treatment:   Medication Administration from 07/14/2023 1244 to 07/14/2023 2121       Date/Time Order Dose Route Action Comments     07/14/2023 1917 EDT levalbuterol (XOPENEX) inhalation solution 1.25 mg 1.25 mg Nebulization Given --        History reviewed. No pertinent past medical history. Present on Admission:  **None**      Admitting Diagnosis: Hip pain [M25.559]  Left leg weakness [R29.898]  Greater trochanter fracture (HCC) [S72.113A]  Closed nondisplaced fracture of greater trochanter of left femur, initial encounter ([address] ) [S72.115A]  Weakness of left lower extremity [R29.898]  Age/Sex: 74 y.o. female  Admission Orders:  07/14/23 2018 inpatient   Scheduled Medications:  enoxaparin, 30 mg, Subcutaneous, Q12H DEBBIE  ipratropium, 0.5 mg, Nebulization, TID  levalbuterol, 1.25 mg, Nebulization, TID  nicotine, 7 mg, Transdermal, Daily  senna, 2 tablet, Oral, Daily      Continuous IV Infusions: none      PRN Meds: not used   acetaminophen, 975 mg, Oral, Q6H PRN  levalbuterol, 1.25 mg, Nebulization, Q4H PRN  methocarbamol, 500 mg, Oral, Q6H PRN  ondansetron, 4 mg, Intravenous, Q4H PRN  oxyCODONE, 5 mg, Oral, Q4H PRN  oxyCODONE, 2.5 mg, Oral, Q4H PRN    Neurovascular checks every 4 hours. IP CONSULT TO ORTHOPEDIC SURGERY    IP CONSULT TO NEUROSURGERY  IP CONSULT TO PULMONOLOGY    Network Utilization Review Department  ATTENTION: Please call with any questions or concerns to [phone] and carefully listen to the prompts so that you are directed to the right person. All voicemails are confidential.  Mat Mcneal all requests for admission clinical reviews, approved or denied determinations and any other requests to dedicated fax number below belonging to the campus where the patient is receiving treatment.  List of dedicated fax numbers for the Facilities:  [place] DENIALS (Administrative/Medical Necessity) [phone]   [facility] (M[facility]/NICU/Pediatrics) [phone]   [address] [place] [address] [place] [phone]   [facility] [address] [facility] [phone] [address] [facility] [address] [address] [place] [id number] [address] [place] [address] [address] [phone]

## 2023-07-15 NOTE — QUICK NOTE
Brief Orthopedic Note    Consulted for nondisplaced left greater troch fx    Nondisplaced left greater troch fx appreciated on XR hip/pelv, left and CT lower extremity wo contrast.    Ordered STAT MRI of left lower extremity/hip, communicated with Dr. Moustapha Phipps in radiology, to further eval fracture. Spoke with MRI tech, Federico Mccord, regarding MRI tonight. Will plan to get MRI completed this evening. Will await MRI results for definitive plan, operative vs non-operative.   Will make pt NPO at midnight pending MRI results, need for operative intervention  Per trauma, will need pulmonology clearance in AM prior to OR  Case and plan discussed with attending, Dr. Jazmín Ferraro PA-C

## 2023-07-15 NOTE — DISCHARGE INSTR - AVS FIRST PAGE
Discharge Instructions - Orthopedics  Ute Wright 74 y.o. female MRN: [id number]  Unit/Bed#: PACU    Weight Bearing Status:                                           Weight bearing as tolerated left lower extremity    DVT prophylaxis  Recommend 325mg aspirin twice a day x 4 weeks    Pain:  Continue analgesics as directed    Dressing Instructions:   Please keep clean, dry and intact until follow up     Appt Instructions: If you do not have your appointment, please call the clinic at [phone]. Follow up with Dr. Jorge L Christian in 2 weeks outpatient for xray and staple removal.  Otherwise followup as scheduled     Contact the office sooner if you experience any increased numbness/tingling in the extremities.

## 2023-07-15 NOTE — OP NOTE
OPERATIVE REPORT  PATIENT NAME: Naa Herrera    :  1955  MRN: [id number]  Pt Location: AN OR ROOM 01    SURGERY DATE: 7/15/2023    Surgeon(s) and Role:     Derian Colin DO - Primary  Lorri Hernandez PAC utilized in the case for assistance with prepping draping positioning and closure of proximal wound and dressing application. Preop Diagnosis:  Greater trochanter fracture ([address]) [S72.113A]  Closed nondisplaced fracture of greater trochanter of left femur, initial encounter ([address]) [S72.115A]  Intertrochanteric fracture    Post-Op Diagnosis Codes:     * Greater trochanter fracture ([address]) [S72.113A]     * Closed nondisplaced fracture of greater trochanter of left femur, initial encounter ([address]) [S72.115A]   Intertrochanteric fracture    Procedure(s):  Left - INSERTION NAIL IM FEMUR ANTEGRADE (TROCHANTERIC)    Specimen(s):  * No specimens in log *    Estimated Blood Loss:   Minimal    Drains:  * No LDAs found *    Anesthesia Type:   Choice    Operative Indications:  Greater trochanter fracture (HCC) [S72.113A]  Closed nondisplaced fracture of greater trochanter of left femur, initial encounter ([address]) [S72.115A]  Intertrochanteric fracture    Operative Findings:  Nondisplaced intertrochanteric fracture    Complications:   None    Procedure and Technique:  Patient was seen and examined in the preoperative area. The left extremity was marked, the consent an H&P had been reviewed. The patient was brought back to the operative suite. The patient was intubated sedated. The patient was placed in supine position on the fracture table. We confirmed reduction with fluoroscopic examination both on AP and lateral view with traction and rotation of the leg. The left lower extremities prepped and draped in a sterile fashion. After proper timeout commenced and identified the left extremity as the operative site an incision was made in line with the femur.  We dissected down through the abductor muscle identified our greater trochanter. We placed our guide pin through the greater trochanter confirmed on AP and lateral fluoroscopic examination proper position. We then used our opening canal Reamer. We opened the greater trochanter. We implanted a 11 mm nail. We then used our external jig and triple trocar to locate our lateral incision for our helical blade and distal locking screw. We made incision in skin using 10 blade. We dissected down to the bone using hemostat and Dugan elevator. We then placed our triple trocar down to bone then placed our guide pin. We confirmed AP and lateral position fluoroscopic examination for appropriate alignment of the pin. We measured this to be 105. We opened the lateral cortex with the lateral cortex Reamer. We then used the triple Reamer set to 1 of and reamed the femoral neck and head. We implanted the 258 mm helical blade. We locked and compressed. We then moved to the distal locking screw using the same incision and the triple trocar we placed the triple trocar down to bone. We drilled both cortices. We measured the screw to be 36. We implanted the 36 mm screw. We then confirmed on AP and lateral x-rays appropriate implantation of short trochanteric fixation nail. We irrigated all wounds closed the deep fascia with 0 Vicryl subcutaneous with 2 O Vicryl staples on skin. We placed Mepilex for dressings on the skin. Anesthesia was reversed and patient was taken back to PACU in stable condition. I was present for the entire procedure. and A qualified resident physician was not available.     Patient Disposition:  PACU         SIGNATURE: Alina Markham DO  DATE: July 15, 2023  TIME: 1:10 PM

## 2023-07-15 NOTE — ANESTHESIA POSTPROCEDURE EVALUATION
Post-Op Assessment Note    CV Status:  Stable  Pain Score: 7    Pain management: adequate     Mental Status:  Alert and awake   Hydration Status:  Euvolemic   PONV Controlled:  Controlled   Airway Patency:  Patent      Post Op Vitals Reviewed: Yes      Staff: CRNA         No notable events documented.     /99 (07/15/23 1456)    Temp 98.5 °F (36.9 °C) (07/15/23 1456)    Pulse (!) 106 (07/15/23 1456)   Resp 20 (07/15/23 1456)    SpO2 99 % (07/15/23 1456)

## 2023-07-15 NOTE — CONSULTS
Pulmonary Consultation   Elmo Nino 74 y.o. female MRN: [id number]  Unit/Bed#: W -01 Encounter: [id number]    Reason for consultation: Pre-operative pulmonary evaluation. Requesting physician: Dr. Tan Hernandez. Impressions:  1. COPD, undetermined severity, without acute exacerbation. 2. Acute hypoxic respiratory failure, uncertain etiology. 3. Active smoker. Recommendations:  1. Patient may proceed to surgery. 2. Schedule atrovent/xopenex q8h, should have an additional treatment 30 minutes prior to intubation. 3. Recommend low threshold to consider extubating to BIPAP post-op, generic settings of 16/8, 40% is reasonable if needed  4. Reduce nicotine patch to 7mg.  5. Chest x-ray now. Patient had an abnormality on CXR in January which was not followed up. It is possible confusion/weakness are a manifestation of lung CA +/- metastases, may need CT. History of Present Illness   HPI:  Elmo Nino is a 74 y.o. female who presents with a fall. She was seen at an urgent care for this and referred to the ER for need of a CT scan. This revealed an acute nondisplaced fracture of the greater trochanter. The patient has complained of left leg weakness for some time, the etiology of which is not clear. She is a current smoker, < 1 ppd, reports only doing so for the past 15 years. She was told a year ago she has COPD. She was given Trelegy which she did not like and then [facility] which was better but induced coughing. She also purchased OTC primatene mist and uses it twice a day or so. No history of exacerbations requiring hospitalization. She smoked up to the day of presentation. Exercise tolerance is not limited except by humidity. Can walk indefinitely on level ground and does not have an issue with stairs. Review of systems:  Review of Systems   Respiratory: Positive for cough and shortness of breath. Neurological: Positive for weakness.    All other systems reviewed and are negative. All other 12-point review of systems are negative. Historical Information   History reviewed. Diagnosed with COPD but no severity noted. History reviewed. No pertinent surgical history. History reviewed. No pertinent family history. Tobacco history: Current smoker, admits to 1/2 PPD, reports x 15 years. Family history: [place].    Meds/Allergies   Current Facility-Administered Medications   Medication Dose Route Frequency   • acetaminophen (TYLENOL) tablet 975 mg  975 mg Oral Q6H PRN   • enoxaparin (LOVENOX) subcutaneous injection 30 mg  30 mg Subcutaneous Q12H [address]   • levalbuterol (XOPENEX) inhalation solution 1.25 mg  1.25 mg Nebulization Q4H PRN   • methocarbamol (ROBAXIN) tablet 500 mg  500 mg Oral Q6H PRN   • nicotine (NICODERM CQ) 21 mg/24 hr TD 24 hr patch 1 patch  1 patch Transdermal Daily   • ondansetron (ZOFRAN) injection 4 mg  4 mg Intravenous Q4H PRN   • oxyCODONE (ROXICODONE) IR tablet 5 mg  5 mg Oral Q4H PRN   • oxyCODONE (ROXICODONE) split tablet 2.5 mg  2.5 mg Oral Q4H PRN   • senna (SENOKOT) tablet 17.2 mg  2 tablet Oral Daily     No Known Allergies    Vitals: Blood pressure 140/96, pulse (!) 109, temperature 98.5 °F (36.9 °C), resp. rate 18, height 5' 2" (1.575 m), weight 47.6 kg (105 lb), SpO2 91 %., on 2 L O2, Body mass index is 19.2 kg/m². Intake/Output Summary (Last 24 hours) at 7/15/2023 0900  Last data filed at 7/15/2023 0700  Gross per 24 hour   Intake 0 ml   Output 0 ml   Net 0 ml     Physical exam:     Physical Exam  Vitals reviewed. Constitutional:       General: She is not in acute distress. Appearance: Normal appearance. She is well-developed. She is not ill-appearing. HENT:      Head: Normocephalic and atraumatic. Eyes:      General: No scleral icterus. Conjunctiva/sclera: Conjunctivae normal.   Neck:      Vascular: No JVD. Cardiovascular:      Rate and Rhythm: Normal rate and regular rhythm. Heart sounds: Normal heart sounds.  No murmur heard.     No friction rub. No gallop. Pulmonary:      Effort: Pulmonary effort is normal. No respiratory distress. Breath sounds: No wheezing or rales. Comments: Diminished throughout with no wheezing. Musculoskeletal:      Cervical back: Neck supple. Skin:     General: Skin is warm and dry. Findings: No rash. Neurological:      General: No focal deficit present. Mental Status: She is alert and oriented to person, place, and time. Mental status is at baseline. Psychiatric:         Mood and Affect: Mood normal.         Behavior: Behavior normal.     Labs: I have personally reviewed pertinent lab results. Results from last 7 days   Lab Units 07/15/23  0447 07/14/23  2041   WBC Thousand/uL 7.48 7.87   HEMOGLOBIN g/dL 14.8 14.7   HEMATOCRIT % 44.9 44.1   PLATELETS Thousands/uL 179 174         Results from last 7 days   Lab Units 07/15/23  0447 07/14/23  2041   POTASSIUM mmol/L 3.6 3.6   CHLORIDE mmol/L 104 101   CO2 mmol/L 27 29   BUN mg/dL 24 18   CREATININE mg/dL 0.54* 0.64   CALCIUM mg/dL 8.8 8.9     Results from last 7 days   Lab Units 07/14/23  2041   INR  1.04   PTT seconds 31     Imaging and other studies: I have personally reviewed pertinent films in PACS    Pulmonary function testing: None. Code Status: Level 1 - Full Code    Thank you for allowing us to participate in the care of your patient.     Raymond Siddiqui M.D.

## 2023-07-15 NOTE — CONSULTS
[facility]  Consult  Name: Harsha Brice 74 y.o. female I MRN: [id number]  Unit/Bed#: OR [place] I Date of Admission: 7/14/2023   Date of Service: 7/15/2023 I Hospital Day: 1    Inpatient consult to Neurosurgery  Consult performed by: Rosa Centeno PA-C  Consult ordered by: Casey Underwood MD        Assessment/Plan   Weakness of left lower extremity  Assessment & Plan  Chronic L ankle weakness   - pt admitted as a trauma after a mechanical fall with a L hip fracture   - upon arrival pt with noted L ankle weakness, per pt and  this has been present for a year but she noticed it has gotten worse over the last 4 months   - reports she ambulates at baseline without a walker or cane, denies any back pain, radiculopathy, numbness, saddle anesthesia, urinary retention, BBI, etc.   - PVR 0 on arrival     Imaging:   · 7/14 MRI L-spine: Minor noncompressive lumbar degenerative change. No cauda equina or foraminal nerve impingement. · 7/14 CT L-spine: No acute osseous abnormality. Degenerative change without high-grade canal or foraminal stenosis as detailed, most pronounced at L3-4 with mild canal and foraminal narrowing. Plan:   · Continue to closely monitor neuro exam  · Frequent neurochecks per primary team  · Maintain normal tensive BP goals, MAP > 65   · No acute neurosurgical intervention indicated at this time  · Case and imaging reviewed  · Patient with longtime complaint of left ankle weakness, specifically unable to dorsiflex with a foot drop. This has been persistent for at least a year but worsened over the last 4 months per patient and . · This is unexplained by the CT and MRI completed this admission. · Patient without evidence of other focal deficits on exam.  Reflexes are 2+ throughout no signs of myelopathy or injury higher in the spine. Therefore do not believe there is much utility in imaging C-spine or T-spine.    · Recommend continued work-up in this regard per primary team  · Can consider outpatient EMG to determine if a peripheral nerve issue. · DVT ppx: Valente, sanchez for chem dvt ppx from a nsgy standpoint   · Pain control per primary team  · Medical management per primary team  · plan for OR today with orthopedic surgery to repair left hip fracture  · PT/OT  · Social work following for assistance with dispo once medically cleared    Neurosurgery will sign off at this time. Patient is encouraged to call and schedule an appointment for follow-up in our office as needed. Please reach out with any further questions or concerns. History of Present Illness   HPI: Franny Saravia is a 74y.o. year old female with a PMH significant for COPD who presented to the [facility] after a ground-level mechanical fall. Patient reports she was walking outside yesterday afternoon when she was startled by a's iron and lost her footing falling onto her left side. No head strike or LOC. Patient reported instant pain to her left hip and wrist.  She was evaluated in urgent care and sent to the ER for higher level of care. Patient noted to have a left hip fracture. On arrival patient was also noted to have chronic left lower extremity weakness, specifically at the ankle and her inability to dorsiflex the ankle. Patient states this has been present for up to a year but worsened over the last 4 months. She states she has been told she has a foot drop. Patient states she really has not seen many doctors in this regard. Patient states she is able to ambulate without assistance of a walker or cane but feels that her foot drags a bit.  can confirm this. Patient denies any back pain, trauma or injuries, numbness, bowel/bladder incontinence, urinary retention, saddle anesthesia. Patient states she did experience a charley horse type pain in her left lower extremity about 4 to 5 months ago and feels that that is what is worsened her symptoms.        Review of Systems Constitutional: Negative for chills and fever. Eyes: Negative for photophobia and visual disturbance. Respiratory: Negative for cough, choking, chest tightness and shortness of breath. Cardiovascular: Negative for chest pain. Gastrointestinal: Negative for abdominal pain. Genitourinary: Negative for dyspareunia, dysuria, enuresis and urgency. Musculoskeletal: Positive for arthralgias and gait problem. Negative for neck pain and neck stiffness. Left hip fracture and pain   Skin: Negative for rash and wound. Neurological: Positive for weakness (Chronic left ankle weakness). Negative for dizziness, tremors, seizures, syncope, facial asymmetry, speech difficulty, light-headedness, numbness and headaches. Psychiatric/Behavioral: Negative for agitation, behavioral problems, confusion and decreased concentration. The patient is not nervous/anxious. Historical Information   History reviewed. No pertinent past medical history. History reviewed. No pertinent surgical history. Social History     Substance and Sexual Activity   Alcohol Use Not Currently     Social History     Substance and Sexual Activity   Drug Use Never     Social History     Tobacco Use   Smoking Status Every Day   • Packs/day: 0.50   • Types: Cigarettes   Smokeless Tobacco Never     History reviewed. No pertinent family history. Meds/Allergies   all current active meds have been reviewed  No Known Allergies    Objective   I/O       07/13 0701  07/14 0700 07/14 0701  07/15 0700 07/15 0701  07/16 0700    P. O.  0 0    Total Intake(mL/kg)  0 (0) 0 (0)    Urine (mL/kg/hr)  0     Stool  0     Total Output  0     Net  0 0           Unmeasured Urine Occurrence  0 x     Unmeasured Stool Occurrence  0 x         Physical Exam  Constitutional:       General: She is not in acute distress. Appearance: She is normal weight. She is not ill-appearing or toxic-appearing.       Comments: Thin, middle-aged female sitting comfortably in bed.  No acute distress. HENT:      Head: Normocephalic and atraumatic. Right Ear: External ear normal.      Left Ear: External ear normal.      Nose: Nose normal. No congestion or rhinorrhea. Mouth/Throat:      Mouth: Mucous membranes are moist.   Eyes:      General: No scleral icterus. Right eye: No discharge. Left eye: No discharge. Extraocular Movements: Extraocular movements intact. Conjunctiva/sclera: Conjunctivae normal.      Pupils: Pupils are equal, round, and reactive to light. Cardiovascular:      Rate and Rhythm: Normal rate. Pulmonary:      Effort: Pulmonary effort is normal. No respiratory distress. Comments: No respiratory distress on nasal cannula  Abdominal:      General: Abdomen is flat. Musculoskeletal:         General: Tenderness present. No deformity or signs of injury. Cervical back: Normal range of motion. No rigidity or tenderness. Right lower leg: No edema. Left lower leg: No edema. Comments: Tenderness appreciated to the left hip  Decreased range of motion at the left hip   Skin:     General: Skin is warm and dry. Capillary Refill: Capillary refill takes less than 2 seconds. Neurological:      Mental Status: She is alert and oriented to person, place, and time. Mental status is at baseline. Cranial Nerves: No cranial nerve deficit. Sensory: No sensory deficit. Motor: Weakness present.       Coordination: Coordination normal.      Gait: Gait normal.      Deep Tendon Reflexes: Reflexes normal.      Comments: GCS 15   A&Ox3   Appropriately answering questions and following commands   No dysarthria or aphasia   No appreciated CN deficits   Strength 5/5 throughout to martínez UE  Strength 5/5 throughout RLE   Some baseline weakness noted to the LLE   (limited by L hip fracture as well at hip)   - LLE: hip 4+/5, knee flex/ex 5/5, DF 2/5, PF 4+/5, EHL 4/5  Sensation intact to light touch to martínez UE and martínez LE   No drift or ataxia appreciated martínez   Reflexes are 2+ throughout martínez UE and martínez LE   (-) Blas's, (-) clonus    Psychiatric:         Mood and Affect: Mood normal.         Behavior: Behavior normal.         Thought Content: Thought content normal.         Judgment: Judgment normal.       Neurologic Exam     Mental Status   Oriented to person, place, and time. Cranial Nerves     CN III, IV, VI   Pupils are equal, round, and reactive to light. Vitals:Blood pressure 162/100, pulse 103, temperature 98.1 °F (36.7 °C), temperature source Temporal, resp. rate 22, height 5' 2" (1.575 m), weight 47.6 kg (105 lb), SpO2 95 %. ,Body mass index is 19.2 kg/m². Lab Results:   Results from last 7 days   Lab Units 07/15/23  0447 07/14/23  2041   WBC Thousand/uL 7.48 7.87   HEMOGLOBIN g/dL 14.8 14.7   HEMATOCRIT % 44.9 44.1   PLATELETS Thousands/uL 179 174   NEUTROS PCT % 73 76*   MONOS PCT % 8 7   EOS PCT % 2 1     Results from last 7 days   Lab Units 07/15/23  0447 07/14/23  2041   POTASSIUM mmol/L 3.6 3.6   CHLORIDE mmol/L 104 101   CO2 mmol/L 27 29   BUN mg/dL 24 18   CREATININE mg/dL 0.54* 0.64   CALCIUM mg/dL 8.8 8.9             Results from last 7 days   Lab Units 07/14/23  2041   INR  1.04   PTT seconds 31     No results found for: "TROPONINT"  ABG:No results found for: "PHART", "AAA3MMQ", "PO2ART", "FYP9HLJ", "P9GJDFFL", "BEART", "SOURCE"    Imaging Studies: I have personally reviewed pertinent reports. MRI lumbar spine w wo contrast    Result Date: 7/15/2023  Narrative: MRI LUMBAR SPINE WITH AND WITHOUT CONTRAST INDICATION: Pain. Degenerative change. . COMPARISON: CT dated 7/14/2023 TECHNIQUE:  Multiplanar, multisequence imaging of the lumbar spine was performed before and after gadolinium administration. . IV Contrast:  5 mL of Gadobutrol injection (SINGLE-DOSE) IMAGE QUALITY:  Diagnostic FINDINGS: VERTEBRAL BODIES:  There are 5 lumbar type vertebral bodies.  Slightly exaggerated lumbar lordosis and lumbosacral angle with no spondylolisthesis or spondylolysis. Minimal focal dextroscoliosis of the upper lumbar spine. No compression fracture. Normal marrow signal is identified within the visualized bony structures. No discrete marrow lesion. SACRUM:  Normal signal within the sacrum. No evidence of insufficiency or stress fracture. DISTAL CORD AND CONUS:  Normal size and signal within the distal cord and conus. PARASPINAL SOFT TISSUES:  Paraspinal soft tissues are unremarkable. LOWER THORACIC DISC SPACES:  Normal disc height and signal.  No disc herniation, canal stenosis or foraminal narrowing. LUMBAR DISC SPACES: L1-L2:  Normal. L2-L3:  Normal. L3-L4: Slight loss of disc height with mild annular bulging. Small annular fissure within the left foraminal portion of the annulus. Mild facet arthropathy with small bilateral facet effusions. Minimal canal stenosis and foraminal narrowing without nerve  impingement. L4-L5: Mild annular bulging with a small left foraminal annular fissure. No disc herniation, canal stenosis or foraminal nerve impingement. L5-S1:  Normal. POSTCONTRAST IMAGING:  No abnormal enhancement. OTHER FINDINGS:  None. Impression: Minor noncompressive lumbar degenerative change. No cauda equina or foraminal nerve impingement. Workstation performed: [id number]     CT spine lumbar without contrast    Result Date: 7/14/2023  Narrative: CT LUMBAR SPINE INDICATION:   Low back pain, progressive neurologic deficit left leg weakness. . COMPARISON: None. TECHNIQUE:  Contiguous axial images through the lumbar spine were obtained. Sagittal and coronal reconstructions were performed. IV Contrast: Radiation dose length product (DLP) for this visit:  392 mGy-cm . This examination, like all CT scans performed in the [facility], was performed utilizing techniques to minimize radiation dose exposure, including the use of iterative reconstruction and automated exposure control. IMAGE QUALITY:  Diagnostic. FINDINGS: ALIGNMENT:  There are 5 lumbar type vertebral bodies. There is preserved normal lumbar lordosis. There is mild dextroscoliosis with apex at L2. VERTEBRAE:  No fracture. No lytic or blastic lesion. DEGENERATIVE CHANGES: Lower Thoracic spine:  Normal lower thoracic disc spaces. L1-2: No disc bulge. No canal or foraminal stenosis. L2-3: No disc bulge. Mild facet arthropathy. No canal or foraminal stenosis. L3-4: Degenerative vacuum disc degeneration without significant disc height loss. Disc bulge. Moderate facet arthropathy. Mild canal stenosis. Left greater than right mild bilateral foraminal narrowing. L4-5: Small disc bulge. Moderate facet arthropathy. Minimal canal narrowing. Mild bilateral foraminal narrowing. L5-S1: Disc bulge. Moderate facet arthropathy. No significant canal or foraminal stenosis. PARASPINAL SOFT TISSUES:   Normal. Colonic diverticulosis. Atherosclerotic change of aortoiliac arteries. Impression: 1. No acute osseous abnormality. 2.  Degenerative change without high-grade canal or foraminal stenosis as detailed, most pronounced at L3-4 with mild canal and foraminal narrowing. Workstation performed: [id number]     CT lower extremity wo contrast left    Result Date: 7/14/2023  Narrative: CT left hip without IV contrast INDICATION: Hip trauma, fracture suspected, xray done trauma, possible fx on xray. COMPARISON: Left hip plain films from 7/14/2023. TECHNIQUE: CT examination of the above was performed. This examination, like all CT scans performed in the [address] [place] [address], was performed utilizing techniques to minimize radiation dose exposure, including the use of iterative reconstruction and automated exposure control software. Multiplanar 2D reformatted images were created from the source data.  Rad dose  386 mGy-cm FINDINGS: OSSEOUS STRUCTURES: Acute, nondisplaced fracture of the left femur greater trochanter (series 601 images 50-64.) VISUALIZED MUSCULATURE: Unremarkable. SOFT TISSUES:  Unremarkable. OTHER PERTINENT FINDINGS:  None. Impression: Acute, nondisplaced fracture of the left femur greater trochanter (series 601 images 50-64.) Workstation performed: [id number]     XR wrist 3+ vw left    Result Date: 7/14/2023  Narrative: LEFT HAND; LEFT WRIST INDICATION:   S69. 92XA: Unspecified injury of left wrist, hand and finger(s), initial encounter. COMPARISON:  None VIEWS:  XR HAND 3+ VW LEFT, XR WRIST 3+ VW LEFT Images: 7 For the purposes of institution wide universal language the following terms will apply: (thumb=1st digit/finger, index finger=2nd digit/finger, long finger=3rd digit/finger, ring=4th digit/finger and small finger=5th digit/finger) FINDINGS: (Left wrist, left hand) There is no acute fracture or dislocation. Mild degenerative changes noted in the fingers. No lytic or blastic osseous lesion. Soft tissues are unremarkable. Impression: No acute osseous abnormality. The findings were discussed with Mehran Segal PA-C. Workstation performed: [id number]     XR hand 3+ vw left    Result Date: 7/14/2023  Narrative: LEFT HAND; LEFT WRIST INDICATION:   S69. 92XA: Unspecified injury of left wrist, hand and finger(s), initial encounter. COMPARISON:  None VIEWS:  XR HAND 3+ VW LEFT, XR WRIST 3+ VW LEFT Images: 7 For the purposes of institution wide universal language the following terms will apply: (thumb=1st digit/finger, index finger=2nd digit/finger, long finger=3rd digit/finger, ring=4th digit/finger and small finger=5th digit/finger) FINDINGS: (Left wrist, left hand) There is no acute fracture or dislocation. Mild degenerative changes noted in the fingers. No lytic or blastic osseous lesion. Soft tissues are unremarkable. Impression: No acute osseous abnormality. The findings were discussed with Mehran Segal PA-C.  Workstation performed: [id number]     XR hip/pelv 2-3 vws left if performed    Result Date: 7/14/2023  Narrative: LEFT HIP INDICATION:   U22.332Q: Unspecified injury of left hip, initial encounter. COMPARISON:  None VIEWS:  XR HIP/PELV 2-3 VWS LEFT  W PELVIS IF PERFORMED Images: 3 FINDINGS: There is a cortical infraction along the lateral margin of the left greater trochanter. This may also be seen along the medial margin of the trochanter. Nondisplaced fracture is suspected. No other fractures are apparent. No significant hip degenerative changes. No lytic or blastic osseous lesion. Soft tissues are unremarkable. Degenerative changes pubic symphysis and visualized lower lumbar spine. Impression: Suspected nondisplaced fracture of the greater trochanter. Follow-up with CT or MR suggested. The findings were discussed with Tyrese Ibarra PA-C at approximately 1045 hours on 7/14/2023. Workstation performed: [id number]     EKG, Pathology, and Other Studies: I have personally reviewed pertinent reports. VTE Prophylaxis: Sequential compression device (Venodyne) , okay for chem dvt ppx from a nsgy standpoint     Code Status: Level 1 - Full Code  Advance Directive and Living Will:      Power of :    POLST:      Counseling / Coordination of Care  I spent 30 minutes with the patient.

## 2023-07-15 NOTE — PHYSICAL THERAPY NOTE
PHYSICAL THERAPY CANCELLATION NOTE          Patient Name: Allyssa LEEJMAURO Date: 7/15/2023             07/15/23 [id number]   Note Type   Note type Cancelled Session   Cancel Reasons Patient to operating room   Additional Comments PT eval orders received, chart review performed. Pt w/ nondisplaced L greater trochanter fx and is tentatively scheduled for OR for IM nail. Will hold PT eval at this time. Please re-consult PT post-op w/ updated activity orders and weight-bearing status. PT will continue to follow as appropriate and as schedule allows.          Concepción Ruiz, PT, DPT  07/15/23

## 2023-07-15 NOTE — OCCUPATIONAL THERAPY NOTE
Occupational Therapy Evaluation Cancellation     Patient Name: Jason MCGOVERN Date: 7/15/2023  Problem List  Active Problems:    Fall    Closed nondisplaced fracture of greater trochanter of left femur (HCC)    Weakness of left lower extremity    COPD (chronic obstructive pulmonary disease) ([address])    Past Medical History  History reviewed. No pertinent past medical history. Past Surgical History  History reviewed. No pertinent surgical history. 07/15/23 0800   Note Type   Note type Cancelled Session   Additional Comments OT order received and chart reviewed. Patient awaiting SX intervention for Nondisplaced left greater troch fx. Will need new orders and WB staus post intervention.

## 2023-07-15 NOTE — ASSESSMENT & PLAN NOTE
Chronic L ankle weakness   - pt admitted as a trauma after a mechanical fall with a L hip fracture   - upon arrival pt with noted L ankle weakness, per pt and  this has been present for a year but she noticed it has gotten worse over the last 4 months   - reports she ambulates at baseline without a walker or cane, denies any back pain, radiculopathy, numbness, saddle anesthesia, urinary retention, BBI, etc.   - PVR 0 on arrival     Imaging:   · 7/14 MRI L-spine: Minor noncompressive lumbar degenerative change. No cauda equina or foraminal nerve impingement. · 7/14 CT L-spine: No acute osseous abnormality. Degenerative change without high-grade canal or foraminal stenosis as detailed, most pronounced at L3-4 with mild canal and foraminal narrowing. Plan:   · Continue to closely monitor neuro exam  · Frequent neurochecks per primary team  · Maintain normal tensive BP goals, MAP > 65   · No acute neurosurgical intervention indicated at this time  · Case and imaging reviewed  · Patient with longtime complaint of left ankle weakness, specifically unable to dorsiflex with a foot drop. This has been persistent for at least a year but worsened over the last 4 months per patient and . · This is unexplained by the CT and MRI completed this admission. · Patient without evidence of other focal deficits on exam.  Reflexes are 2+ throughout no signs of myelopathy or injury higher in the spine. Therefore do not believe there is much utility in imaging C-spine or T-spine. · Recommend continued work-up in this regard per primary team  · Can consider outpatient EMG to determine if a peripheral nerve issue.   · DVT ppx: sanchez Victor for chem dvt ppx from a nsgy standpoint   · Pain control per primary team  · Medical management per primary team  · plan for OR today with orthopedic surgery to repair left hip fracture  · PT/OT  · Social work following for assistance with dispo once medically cleared    Neurosurgery will sign off at this time. Patient is encouraged to call and schedule an appointment for follow-up in our office as needed. Please reach out with any further questions or concerns.

## 2023-07-15 NOTE — H&P
[address] [facility]  H&P  Name: Rodolfo Mcmillan 74 y.o. female I MRN: [id number]  Unit/Bed#: ED-25 I Date of Admission: 7/14/2023   Date of Service: 7/14/2023 I Hospital Day: 0      Assessment/Plan   Fall  Assessment & Plan  - Status post fall with the below noted injuries. - Fall precautions. - Geriatric Medicine consultation for evaluation, medication review and recommendations.  - PT and OT evaluation and treatment as indicated. - Case Management consultation for disposition planning. Closed nondisplaced fracture of greater trochanter of left femur ([facility])  Assessment & Plan  - Left greater trochanter fracture, present on admission.  - Status post ground level mechanical fall on 7/13. - Appreciate Orthopedic surgery evaluation, recommendations and interventions as noted. - Maintain NON weightbearing status on the left lower extremity.  - Monitor left lower extremity neurovascular exam.  - Continue multimodal analgesic regimen.  - Continue DVT prophylaxis. - PT and OT evaluation and treatment as indicated. - Outpatient follow up with Orthopedic surgery for re-evaluation. Weakness of left lower extremity  Assessment & Plan  - Reports left lower extremity weakness, ongoing x 1 year. - 2/5 strength with dorsiflexion and plantarflexion, no movement against gravity, in left lower extremity.  - No numbness, no saddle anesthesia, no bowel or bladder incontinence, normal rectal tone, post-void residual 0.   - 7/14 CT lumber spine:  No acute osseous abnormality. Degenerative change without high-grade canal or foraminal stenosis, most pronounced at L3-4 with mild canal and foraminal narrowing.    - Will consider MRI following neurosurgery evaluation.   - Consulted neurosurgery, appreciate recommendations.      COPD (chronic obstructive pulmonary disease) ([facility])  Assessment & Plan  - Evidence of COPD on CT chest in 2018   - No history of PFTs performed   - No home oxygen requirement   - Uses Primatene mist at home   - Will order Xopenex nebs PRN during hospitalization   - Patient is currently requiring 2 L NC to maintain saturations 88-90%   - Frequent wet coughing throughout encounter which she states is her baseline          Trauma Alert: Evaluation; trauma team arrived at [facility] Life of Arrival: Self  Trauma Team: Attending Irma Diaz and Residents Adelaide Galan  Consultants:     Orthopedics: routine consult; Epic consult order placed; Neurosurgery: routine consult; Epic consult order placed;    Trauma Active Problems: Nondisplaced left greater trochanter fracture     Trauma Plan: Will admit to trauma service for further management. Will maintain NWB status to LLE with orthopedic consult. Given chronic history of left lower extremity weakness, ongoing for the past year, will consult neurosurgery for further recommendations during hospitalization. Chief Complaint: Fall     History of Present Illness   HPI:  Kaylen Romero is a 74 y.o. female who presents with ground level mechanical fall. Patient was walking outside yesterday afternoon around 430 PM, when she heard a siren, became startled, and lost her footing causing her to fall onto her left side. Denies head strike or loss of consciousness. Patient was still having pain in her left hip and left wrist today so she went to Urgent care, who sent her to the ED for further evaluation. Trauma imaging reveals left greater trochanteric fracture. XR wrist was negative but patient was placed in splint for comfort. Of note, patient has had chronic left lower extremity weakness, ongoing for the past year. Patient states this did not contribute to this particular fall, but her left knee has been giving out intermittently over the last year. No back pain, no saddle anesthesia, no urinary retention, no bowel or bladder incontinence. Review of Systems   Constitutional: Negative for chills and fever. Respiratory: Positive for cough. Cardiovascular: Negative for chest pain. Gastrointestinal: Negative for abdominal pain and vomiting. Genitourinary: Negative for decreased urine volume and difficulty urinating. Musculoskeletal: Positive for arthralgias (left hip pain) and gait problem. Negative for back pain. Skin: Negative for wound. Neurological: Positive for weakness (left lower extremity). Negative for headaches. Historical Information   Efforts to obtain history included the following sources: family member, other medical personnel, and patient    Past Medical History:   History reviewed. No pertinent past medical history. Past Surgical History: History reviewed. No pertinent surgical history. Social History:  Alcohol Use:   Social History     Substance and Sexual Activity   Alcohol Use Not Currently     Drug Use:   Social History     Substance and Sexual Activity   Drug Use Never     Tobacco Use:   Social History     Tobacco Use   Smoking Status Every Day   • Packs/day: 0.50   • Types: Cigarettes   Smokeless Tobacco Never       Immunizations: There is no immunization history on file for this patient. Last Tetanus: will update   Family History: non-contributory  Meds/Allergies   all current active meds have been reviewed     No Known Allergies    PHYSICAL EXAM      Objective   Vitals:   First set: Temperature: 98.6 °F (37 °C) (07/14/23 1313)  Pulse: 95 (07/14/23 1313)  Respirations: 18 (07/14/23 1313)  Blood Pressure: (!) 168/108 (07/14/23 1313)    Primary Survey:   (A) Airway: intact  (B) Breathing: bilateral breath sounds   (C) Circulation: Pulses:   normal  (D) Disabliity:  GCS Total:  15  (E) Expose:  Completed    Secondary Survey: (Click on Physical Exam tab above)    Physical Exam  Constitutional:       General: She is not in acute distress. Appearance: Normal appearance. She is ill-appearing (frail appearing). She is not toxic-appearing or diaphoretic. HENT:      Head: Normocephalic and atraumatic. Right Ear: External ear normal.      Left Ear: External ear normal.      Nose: Nose normal.      Mouth/Throat:      Mouth: Mucous membranes are moist.   Eyes:      Extraocular Movements: Extraocular movements intact. Pupils: Pupils are equal, round, and reactive to light. Cardiovascular:      Rate and Rhythm: Normal rate and regular rhythm. Pulses: Normal pulses. Heart sounds: Normal heart sounds. No murmur heard. Pulmonary:      Effort: Pulmonary effort is normal. No respiratory distress. Breath sounds: Rhonchi present. Comments: Frequent coughing throughout encounter  Abdominal:      General: Abdomen is flat. Palpations: Abdomen is soft. Tenderness: There is no abdominal tenderness. Musculoskeletal:         General: Tenderness (left greater trochanter) present. No deformity. Cervical back: Normal range of motion and neck supple. No tenderness. Comments: neurovascularly intact with 2+ DP pulses    Skin:     General: Skin is warm and dry. Capillary Refill: Capillary refill takes less than 2 seconds. Findings: No bruising. Neurological:      General: No focal deficit present. Mental Status: She is alert and oriented to person, place, and time. Mental status is at baseline. GCS: GCS eye subscore is 4. GCS verbal subscore is 5. GCS motor subscore is 6. Sensory: Sensation is intact. Motor: Weakness (2/5 strength in LLE, no effort against gravity) present.       Comments: Sensation to light touch intact in distal extremities; no reported saddle anesthesia          Invasive Devices     Peripheral Intravenous Line  Duration           Peripheral IV 07/14/23 Right Forearm <1 day                Lab Results: Pending  Imaging/EKG Studies: positive for acute findings: left greater trochanter fracture  Other Studies:     Code Status: Level 1 - Full Code  Advance Directive and Living Will:      Power of :    POLST:      Counseling / Coordination of Care  Total floor / unit time spent today 30 minutes. This involved direct patient contact where I performed a full history and physical, reviewed previous records, and reviewed laboratory and other diagnostic studies. Total Critical Care time spent 30 minutes excluding procedures, teaching and family updates.

## 2023-07-15 NOTE — PROGRESS NOTES
[address] [facility]  Progress Note  Name: Cristiana Rachel  MRN: [id number]  Unit/Bed#: OR POOL I Date of Admission: 7/14/2023   Date of Service: 7/15/2023 I Hospital Day: 1    Assessment/Plan   COPD (chronic obstructive pulmonary disease) ([address])  Assessment & Plan  - Evidence of COPD on CT chest in 2018   - No history of PFTs performed   - No home oxygen requirement   - Uses Primatene mist at home   - Will order Xopenex nebs PRN during hospitalization   - Patient is currently requiring 2 L NC to maintain saturations 88-90%   Pulmonary consulted and faby patient    Weakness of left lower extremity  Assessment & Plan  - Reports left lower extremity weakness, ongoing x 1 year. - 2/5 strength with dorsiflexion and plantarflexion, no movement against gravity, in left lower extremity.  - No numbness, no saddle anesthesia, no bowel or bladder incontinence, normal rectal tone, post-void residual 0.   - 7/14 CT lumber spine:  No acute osseous abnormality. Degenerative change without high-grade canal or foraminal stenosis, most pronounced at L3-4 with mild canal and foraminal narrowing.    - Will consider MRI following neurosurgery evaluation.   - Consulted neurosurgery, appreciate recommendations. Closed nondisplaced fracture of greater trochanter of left femur ([facility])  Assessment & Plan  - Left greater trochanter fracture, present on admission.  - Status post ground level mechanical fall on 7/13. - Appreciate Orthopedic surgery evaluation, recommendations and interventions as noted. - Maintain NON weightbearing status on the left lower extremity.  - Monitor left lower extremity neurovascular exam.  - Continue multimodal analgesic regimen.  - Continue DVT prophylaxis. - PT and OT evaluation and treatment as indicated. - Outpatient follow up with Orthopedic surgery for re-evaluation. Fall  Assessment & Plan  - Status post fall with the below noted injuries. - Fall precautions.   - Geriatric Medicine consultation for evaluation, medication review and recommendations.  - PT and OT evaluation and treatment as indicated. - Case Management consultation for disposition planning. TRAUMA TERTIARY SURVEY NOTE    VTE Prophylaxis:Enoxaparin (Lovenox)     Disposition: rehab    Code status:  Level 1 - Full Code    Consultants: IP CONSULT TO ORTHOPEDIC SURGERY  IP CONSULT TO CASE MANAGEMENT  IP CONSULT TO NEUROSURGERY  IP CONSULT TO PULMONOLOGY    Subjective   Transfer from: home    Mechanism of Injury:Fall     Chief Complaint: LLE weakness and left hip pain    HPI/Last 24 hour events: Admitted to trauma, Ortho consulted as well as Pulmonary and Geriatrics. Objective   Vitals:   Temp:  [98.1 °F (36.7 °C)-98.6 °F (37 °C)] 98.5 °F (36.9 °C)  HR:  [] 98  Resp:  [18-22] 20  BP: (135-179)/() 149/90    I/O       07/13 0701  07/14 0700 07/14 0701  07/15 0700 07/15 0701  07/16 0700    P. O.  0 0    I.V. (mL/kg)   500 (10.5)    IV Piggyback   150    Total Intake(mL/kg)  0 (0) 650 (13.7)    Urine (mL/kg/hr)  0     Stool  0     Total Output  0     Net  0 +650           Unmeasured Urine Occurrence  0 x     Unmeasured Stool Occurrence  0 x            Physical Exam:   GENERAL APPEARANCE: comfortable  NEURO: GCS - 15  HEENT: EOm's intact  CV: RRR< no complaints of chest pain  LUNGS: CTA bilaterally, no shortness of breath  GI: NPO for OR  : voiding  MSK: moving around in bed  SKIN: warm and dry    Invasive Devices     Peripheral Intravenous Line  Duration           Peripheral IV 07/14/23 Right Forearm <1 day                   1. Before the illness or injury that brought you to the Emergency, did you need someone to help you on a regular basis? 1=Yes   2. Since the illness or injury that brought you to the Emergency, have you needed more help than usual to take care of yourself? 1=Yes   3.  Have you been hospitalized for one or more nights during the past 6 months (excluding a stay in the Emergency Department)? 0=No   4. In general, do you see well? 0=Yes   5. In general, do you have serious problems with your memory? 0=No   6. Do you take more than three different medications everyday? 1=Yes   TOTAL   3     Did you order a geriatric consult if the score was 2 or greater?: yes         Lab Results:    Latest Reference Range & Units 07/15/23 04:47   Sodium 135 - 147 mmol/L 138   Potassium 3.5 - 5.3 mmol/L 3.6   Chloride 96 - 108 mmol/L 104   CO2 21 - 32 mmol/L 27   Anion Gap mmol/L 7   BUN 5 - 25 mg/dL 24   Creatinine 0.60 - 1.30 mg/dL 0.54 (L)   Glucose, Random 65 - 140 mg/dL 96   Calcium 8.4 - 10.2 mg/dL 8.8   eGFR ml/min/1.73sq m 99   WBC 4.31 - 10.16 Thousand/uL 7.48   Red Blood Cell Count 3.81 - 5.12 Million/uL 4.92   Hemoglobin 11.5 - 15.4 g/dL 14.8   HCT 34.8 - 46.1 % 44.9   MCV 82 - 98 fL 91   MCH 26.8 - 34.3 pg 30.1   MCHC 31.4 - 37.4 g/dL 33.0   RDW 11.6 - 15.1 % 14.6   Platelet Count 833 - 390 Thousands/uL 179   MPV 8.9 - 12.7 fL 10.4   nRBC /100 WBCs 0   (L): Data is abnormally low    Imaging Results:   Chest Xray(s):    FAST exam(s): N/A   CT Scan(s):    Additional Xray(s): Left hand - neg     Other Studies: MRI Lumbar spine -    Minor noncompressive lumbar degenerative change.  No cauda equina or foraminal nerve impingement.

## 2023-07-15 NOTE — ASSESSMENT & PLAN NOTE
- Evidence of COPD on CT chest in 2018   - No history of PFTs performed   - No home oxygen requirement   - Uses Primatene mist at home   - Will order Xopenex nebs PRN during hospitalization   - Patient is currently requiring 2 L NC to maintain saturations 88-90%   Pulmonary consulted and faby patient

## 2023-07-15 NOTE — CONSULTS
Orthopedics   Aurora Mccarty 74 y.o. female MRN: [id number]  Unit/Bed#: W -01      Chief Complaint:   left hip pain    HPI:   74 y.o. female community ambulator status post fall from standing height while at [employer] complaining of left hip pain and inability to bear weight. Patient also has left wrist pain and ecchymosis as well. Without Headstrike, without LOC. Pain is sharp in character, Located lateral, acute in onset intermittent in duration, severe in intensity. Exacerbating factors weightbearing, remitting factors rest.  None radiating, none numbness, none tingling. Patient does report the inability to dorsiflex and plantarflex the foot very well. Also reports history of charley horses in the leg as well. Review Of Systems:   · Skin: Normal small amount of ecchymosis over the lateral aspect of the hip  · Neuro: See HPI  · Musculoskeletal: See HPI  · 14 point review of systems negative except as stated above     Past Medical History:   History reviewed. No pertinent past medical history. Past Surgical History:   History reviewed. No pertinent surgical history. Family History:  Family history reviewed and non-contributory  History reviewed. No pertinent family history.     Social History:  Social History     Socioeconomic History   • Marital status: /Civil Union     Spouse name: None   • Number of children: None   • Years of education: None   • Highest education level: None   Occupational History   • None   Tobacco Use   • Smoking status: Every Day     Packs/day: 0.50     Types: Cigarettes   • Smokeless tobacco: Never   Vaping Use   • Vaping Use: Former   Substance and Sexual Activity   • Alcohol use: Not Currently   • Drug use: Never   • Sexual activity: None   Other Topics Concern   • None   Social History Narrative   • None     Social Determinants of Health     Financial Resource Strain: Not on file   Food Insecurity: Not on file   Transportation Needs: Not on file Physical Activity: Not on file   Stress: Not on file   Social Connections: Not on file   Intimate Partner Violence: Not on file   Housing Stability: Not on file       Allergies:   No Known Allergies        Labs:  0   Lab Value Date/Time    HCT 44.9 07/15/2023 0447    HCT 44.1 07/14/2023 2041    HCT 46.3 (H) 09/27/2018 1031    HGB 14.8 07/15/2023 0447    HGB 14.7 07/14/2023 2041    HGB 15.3 09/27/2018 1031    INR 1.04 07/14/2023 2041    WBC 7.48 07/15/2023 0447    WBC 7.87 07/14/2023 2041    WBC 4.61 09/27/2018 1031    ESR 16 09/27/2018 1031       Meds:    Current Facility-Administered Medications:   •  acetaminophen (TYLENOL) tablet 975 mg, 975 mg, Oral, Q6H PRN, Josh Mcneal MD  •  enoxaparin (LOVENOX) subcutaneous injection 30 mg, 30 mg, Subcutaneous, Q12H [facility], Josh Mcneal MD, 30 mg at 07/14/23 2342  •  ipratropium (ATROVENT) 0.02 % inhalation solution 0.5 mg, 0.5 mg, Nebulization, TID, Regan Concepcion MD, 0.5 mg at 07/15/23 0946  •  levalbuterol (XOPENEX) inhalation solution 1.25 mg, 1.25 mg, Nebulization, Q4H PRN, Josh Mcneal MD  •  levalbuterol [facility]) inhalation solution 1.25 mg, 1.25 mg, Nebulization, TID, Regan Concepcion MD, 1.25 mg at 07/15/23 0946  •  methocarbamol (ROBAXIN) tablet 500 mg, 500 mg, Oral, Q6H PRN, Josh Mcneal MD  •  nicotine (NICODERM CQ) 7 mg/24hr TD 24 hr patch 7 mg, 7 mg, Transdermal, Daily, Regan Concepcion MD  •  ondansetron [facility]) injection 4 mg, 4 mg, Intravenous, Q4H PRN, Josh Mcneal MD  •  oxyCODONE (ROXICODONE) IR tablet 5 mg, 5 mg, Oral, Q4H PRN, Josh Mcneal MD  •  oxyCODONE (ROXICODONE) split tablet 2.5 mg, 2.5 mg, Oral, Q4H PRN, Josh Mcneal MD  •  senna (SENOKOT) tablet 17.2 mg, 2 tablet, Oral, Daily, Josh Mcneal MD    Blood Culture:   No results found for: "BLOODCX"    Wound Culture:   No results found for: "WOUNDCULT"    Ins and Outs:  No intake/output data recorded.           Physical Exam:   /96   Pulse (!) 109   Temp 98.5 °F (36.9 °C)   Resp 18   Ht 5' 2" (1.575 m)   Wt 47.6 kg (105 lb)   SpO2 91%   BMI 19.20 kg/m²   Gen: No acute distress, resting comfortably in bed  HEENT: Eyes clear, moist mucus membranes, hearing intact  Respiratory: No audible wheezing or stridor  Cardiovascular: Well Perfused peripherally, 2+ distal pulse  Abdomen: nondistended, no peritoneal signs  Musculoskeletal: left lower extremity  · Skin intact small amount remote ecchymosis over left aspect of the greater trochanter, limb shortened and externally rotated  · Tender to palpation over hip  · ROM is actually within normal limits but somewhat painful. · Sensation intact L3-S1  · Very little ankle dorsi/plantar flexion, EHL/FHL able to wiggle toes but not able to dorsiflex more than twitch  · 2+ DP/ PT pulse  · Musculature is soft and compressible, no pain with passive stretch  · There is some mild ecchymosis over the left wrist at the base of the thumb. He does have some tenderness here. She is in a splint x-rays were ordered. Radiology:   I personally reviewed the films. X-rays AP/Lateral and left hip views of left hip shows greater trochanteric fracture on x-ray but given the fact that 50% of greater trochanteric fractures can have extension into the intertrochanteric region an MRI was ordered which shows intertrochanteric extension  X-ray of left wrist may show subtle fracture in the radial styloid but inconclusive. We will continue to keep her in the splint and have her follow-up in the outpatient repeat x-rays at that time.  _*_*_*_*_*_*_*_*_*_*_*_*_*_*_*_*_*_*_*_*_*_*_*_*_*_*_*_*_*_*_*_*_*_*_*_*_*_*_*_*_*    Assessment:  74 y. o.female status post fall with leftIntertrochanteric femur fracture    Plan:   · Non weight bearing left lower extremity  · Analgesics for pain  · NPO   · Medicine consult for all medical management and preoperative risk stratification  · To OR for IM nail femur fracture of Intertrochanteric femur fracture  · Body mass index is 19.2 kg/m². .  · Dispo: Ortho will follow  · Plan for or today would recommend spine follow-up and spine assessment due to the fact that she has no ability to dorsiflex and minimal ability to plantarflex is most likely nerve irritation but cannot be explained by just the injury itself  · Continue with splint for left wrist.  Weightbearing as tolerated on left wrist with splint. Will need follow-up x-ray on left wrist in the office.     Letty Vergara, DO

## 2023-07-15 NOTE — ED PROVIDER NOTES
History  Chief Complaint   Patient presents with   • Hip Pain     PT presents to ED after a fall yesterday and c/o left wrist pain (xrays = badly bruised) and hip appears to not be broke, but was sent here for a CT (PT was provided a script)     Is a 60-year-old female presents emergency department with left hip pain since yesterday when she had a fall. She is also complaining of left wrist pain. She was seen in urgent care and had x-rays the left wrist performed. No fractures were seen. There was a question of a fracture of the greater trochanter on the left hip and she was sent to the emergency department for CAT scan. During questioning the patient advised that she has been unable to lift her left leg for the past month or may be slightly longer. She is having difficulty extending her left knee and difficulty with dorsiflexion of her left foot. She feels like the left leg is generally weak. She denies any back pain. She feels like the left leg has been buckling under her which is causing falls at times. She denies any loss of bowel or bladder function. No urinary incontinence or history to suggest retention. Patient is a smoker. She states that she does have a history of COPD. She was given prescription for albuterol in the past but did not like it and instead uses Primatene Mist at home. She denies fevers or chills. The  states that since yesterday he has had 2 fully assist her with ambulation as she has been unable to bear any weight on her left leg which is new. Differential diagnosis includes hip fracture, concern for lumbar process with spine involvement given weakness of the leg. None       History reviewed. No pertinent past medical history. History reviewed. No pertinent surgical history. History reviewed. No pertinent family history. I have reviewed and agree with the history as documented.     E-Cigarette/Vaping   • E-Cigarette Use Former User E-Cigarette/Vaping Substances     Social History     Tobacco Use   • Smoking status: Every Day     Packs/day: 0.50     Types: Cigarettes   • Smokeless tobacco: Never   Vaping Use   • Vaping Use: Former   Substance Use Topics   • Alcohol use: Not Currently   • Drug use: Never       Review of Systems   Constitutional: Negative for activity change, appetite change and fever. HENT: Negative for congestion, ear pain, rhinorrhea and sore throat. Respiratory: Negative for cough, shortness of breath and wheezing. Cardiovascular: Negative for chest pain and palpitations. Gastrointestinal: Negative for abdominal pain, diarrhea, nausea and vomiting. Endocrine: Negative for polyuria. Genitourinary: Negative for difficulty urinating, dysuria, frequency and urgency. Musculoskeletal: Positive for arthralgias. Negative for myalgias. Skin: Negative for color change and rash. Allergic/Immunologic: Negative for immunocompromised state. Neurological: Positive for weakness. Negative for dizziness, syncope and light-headedness. Hematological: Does not bruise/bleed easily. Psychiatric/Behavioral: Negative for confusion. All other systems reviewed and are negative. Physical Exam  Physical Exam  Vitals and nursing note reviewed. Constitutional:       General: She is not in acute distress. Appearance: She is well-developed. HENT:      Head: Normocephalic and atraumatic. Nose: Nose normal.   Eyes:      General: No scleral icterus. Conjunctiva/sclera: Conjunctivae normal.   Cardiovascular:      Rate and Rhythm: Normal rate and regular rhythm. Heart sounds: Normal heart sounds. Pulmonary:      Effort: Pulmonary effort is normal. No respiratory distress. Breath sounds: Normal breath sounds. No stridor. No wheezing. Abdominal:      General: There is no distension. Palpations: Abdomen is soft. Tenderness: There is no abdominal tenderness. There is no guarding or rebound. Musculoskeletal:         General: Tenderness ( Tender over left greater trochanter.) present. No deformity. Cervical back: Normal range of motion and neck supple. Skin:     General: Skin is warm and dry. Findings: No rash. Neurological:      General: No focal deficit present. Mental Status: She is alert and oriented to person, place, and time. Cranial Nerves: No cranial nerve deficit. Motor: Weakness present. Comments:  Patient has 2 out of 5 strength left hip flexor, 2/5 strength left knee extension. 2/5 strength left foot dorsiflexion, 3 out of 5 left foot plantarflexion   Psychiatric:         Thought Content:  Thought content normal.         Vital Signs  ED Triage Vitals [07/14/23 1313]   Temperature Pulse Respirations Blood Pressure SpO2   98.6 °F (37 °C) 95 18 (!) 168/108 95 %      Temp Source Heart Rate Source Patient Position - Orthostatic VS BP Location FiO2 (%)   Oral Monitor Sitting Right arm --      Pain Score       8           Vitals:    07/14/23 1313 07/14/23 1633 07/14/23 1847 07/14/23 2127   BP: (!) 168/108 (!) 175/96 (!) 179/91 148/98   Pulse: 95 (S) (!) 116 (!) 112 (!) 112   Patient Position - Orthostatic VS: Sitting Sitting           Visual Acuity      ED Medications  Medications   nicotine (NICODERM CQ) 21 mg/24 hr TD 24 hr patch 1 patch (has no administration in time range)   senna (SENOKOT) tablet 17.2 mg (has no administration in time range)   enoxaparin (LOVENOX) subcutaneous injection 30 mg (has no administration in time range)   levalbuterol (XOPENEX) inhalation solution 1.25 mg (has no administration in time range)   methocarbamol (ROBAXIN) tablet 500 mg (has no administration in time range)   acetaminophen (TYLENOL) tablet 975 mg (has no administration in time range)   ondansetron (ZOFRAN) injection 4 mg (has no administration in time range)   oxyCODONE (ROXICODONE) split tablet 2.5 mg (has no administration in time range)   oxyCODONE (ROXICODONE) IR tablet 5 mg (has no administration in time range)   levalbuterol (XOPENEX) inhalation solution 1.25 mg (1.25 mg Nebulization Given 7/14/23 1917)       Diagnostic Studies  Results Reviewed     Procedure Component Value Units Date/Time    Basic metabolic panel [[id number]]  (Abnormal) Collected: 07/14/23 2041    Lab Status: Final result Specimen: Blood from Arm, Right Updated: 07/14/23 2104     Sodium 136 mmol/L      Potassium 3.6 mmol/L      Chloride 101 mmol/L      CO2 29 mmol/L      ANION GAP 6 mmol/L      BUN 18 mg/dL      Creatinine 0.64 mg/dL      Glucose 218 mg/dL      Calcium 8.9 mg/dL      eGFR 93 ml/min/1.73sq m     Narrative:      [place] guidelines for Chronic Kidney Disease (CKD):   •  Stage 1 with normal or high GFR (GFR > 90 mL/min/1.73 square meters)  •  Stage 2 Mild CKD (GFR = 60-89 mL/min/1.73 square meters)  •  Stage 3A Moderate CKD (GFR = 45-59 mL/min/1.73 square meters)  •  Stage 3B Moderate CKD (GFR = 30-44 mL/min/1.73 square meters)  •  Stage 4 Severe CKD (GFR = 15-29 mL/min/1.73 square meters)  •  Stage 5 End Stage CKD (GFR <15 mL/min/1.73 square meters)  Note: GFR calculation is accurate only with a steady state creatinine    Protime-INR [[id number]]  (Normal) Collected: 07/14/23 2041    Lab Status: Final result Specimen: Blood from Arm, Right Updated: 07/14/23 2058     Protime 13.8 seconds      INR 1.04    APTT [[id number]]  (Normal) Collected: 07/14/23 2041    Lab Status: Final result Specimen: Blood from Arm, Right Updated: 07/14/23 2058     PTT 31 seconds     CBC and differential [[id number]]  (Abnormal) Collected: 07/14/23 2041    Lab Status: Final result Specimen: Blood from Arm, Right Updated: 07/14/23 2047     WBC 7.87 Thousand/uL      RBC 4.90 Million/uL      Hemoglobin 14.7 g/dL      Hematocrit 44.1 %      MCV 90 fL      MCH 30.0 pg      MCHC 33.3 g/dL      RDW 14.4 %      MPV 9.8 fL      Platelets 159 Thousands/uL      nRBC 0 /100 WBCs      Neutrophils Relative 76 %      Immat GRANS % 0 %      Lymphocytes Relative 15 %      Monocytes Relative 7 %      Eosinophils Relative 1 %      Basophils Relative 1 %      Neutrophils Absolute 5.98 Thousands/µL      Immature Grans Absolute 0.03 Thousand/uL      Lymphocytes Absolute 1.17 Thousands/µL      Monocytes Absolute 0.52 Thousand/µL      Eosinophils Absolute 0.11 Thousand/µL      Basophils Absolute 0.06 Thousands/µL                  CT spine lumbar without contrast   Final Result by Joe Pabon MD (07/14 1918)      1. No acute osseous abnormality. 2.  Degenerative change without high-grade canal or foraminal stenosis as detailed, most pronounced at L3-4 with mild canal and foraminal narrowing. Workstation performed: [id number]         CT lower extremity wo contrast left   Final Result by Ryder Delcid MD (07/14 1636)      Acute, nondisplaced fracture of the left femur greater trochanter (series 601 images 50-64.)         Workstation performed: [id number]         MRI inpatient order    (Results Pending)              Procedures  Procedures         ED Course  ED Course as of 07/14/23 2133 Fri Jul 14, 2023   1906 Patient with hx of COPD. Sat down to 86% on RA.    1942 D/w Dr. Leticia Barber - trauma. Will admit patient. Medical Decision Making   Patient with left greater trochanteric fracture. Unable to bear weight. She does have weakness of the left hip flexor knee extension dorsi and plantarflexion of the foot. Concern for possible spinal process. Lumbar spine shows no acute severe stenosis on CT. Patient will require admission to the trauma service for further evaluation as she is currently unable to ambulate. Amount and/or Complexity of Data Reviewed  Independent Historian: spouse     Details:   provided some details as patient is a poor historian  External Data Reviewed: notes. Details:  External notes were reviewed from urgent care.   Patient had negative findings in the left wrist and forearm. Patient did have a questionable left greater trochanteric fracture which was called to the urgent care PA the patient subsequently referred to the emergency department. Radiology: ordered. Risk  Prescription drug management. Decision regarding hospitalization. Disposition  Final diagnoses:   Greater trochanter fracture ([address])   Left leg weakness     Time reflects when diagnosis was documented in both MDM as applicable and the Disposition within this note     Time User Action Codes Description Comment    7/14/2023  7:40 PM [place] [facility] Add [S72.113A] Greater trochanter fracture ([address])     7/14/2023  7:40 PM [place] [facility] Add [R29.898] Left leg weakness     7/14/2023  8:15 PM [place] [facility] Add [S72.115A] Closed nondisplaced fracture of greater trochanter of left femur, initial encounter ([address])     7/14/2023  8:28 PM Genevieve Railing [R29.898] Weakness of left lower extremity       ED Disposition     ED Disposition   Admit    Condition   Stable    Date/Time   Fri Jul 14, 2023  7:40 PM    Comment   Case was discussed with Dr. Chelsea Malloy and the patient's admission status was agreed to be Admission Status: observation status to the service of Dr. Chelsea Malloy . Follow-up Information    None         There are no discharge medications for this patient. No discharge procedures on file.     PDMP Review     None          ED Provider  Electronically Signed by           Jonathan Copeland MD  07/14/23 9927

## 2023-07-15 NOTE — ASSESSMENT & PLAN NOTE
- Reports left lower extremity weakness, ongoing x 1 year. - 2/5 strength with dorsiflexion and plantarflexion, no movement against gravity, in left lower extremity.  - No numbness, no saddle anesthesia, no bowel or bladder incontinence, normal rectal tone, post-void residual 0.   - 7/14 CT lumber spine:  No acute osseous abnormality. Degenerative change without high-grade canal or foraminal stenosis, most pronounced at L3-4 with mild canal and foraminal narrowing.    - Will consider MRI following neurosurgery evaluation.   - Consulted neurosurgery, appreciate recommendations.

## 2023-07-15 NOTE — ASSESSMENT & PLAN NOTE
- Evidence of COPD on CT chest in 2018   - No history of PFTs performed   - No home oxygen requirement   - Uses Primatene mist at home   - Will order Xopenex nebs PRN during hospitalization   - Patient is currently requiring 2 L NC to maintain saturations 88-90%   - Frequent wet coughing throughout encounter which she states is her baseline

## 2023-07-15 NOTE — ANESTHESIA PREPROCEDURE EVALUATION
Procedure:  INSERTION NAIL IM FEMUR ANTEGRADE (TROCHANTERIC) (Left: Leg Upper)    Relevant Problems   NEURO/PSYCH   (+) Weakness of left lower extremity      PULMONARY   (+) COPD (chronic obstructive pulmonary disease) (HCC)      Daily smoker    Pulm consult reviewed:  "  1. Patient may proceed to surgery. 2. Schedule atrovent/xopenex q8h, should have an additional treatment 30 minutes prior to intubation. 3. Recommend low threshold to consider extubating to BIPAP post-op, generic settings of 16/8, 40% is reasonable if needed  4. Reduce nicotine patch to 7mg.  5. Chest x-ray now. Patient had an abnormality on CXR in January which was not followed up. It is possible confusion/weakness are a manifestation of lung CA +/- metastases, may need CT.  "  Physical Exam    Airway    Mallampati score: II  TM Distance: >3 FB  Neck ROM: full     Dental   lower dentures and upper dentures,     Cardiovascular  Cardiovascular exam normal    Pulmonary  Comment: Coarse breath sounds, Decreased breath sounds, No wheezes,     Other Findings        Anesthesia Plan  ASA Score- 3 Emergent    Anesthesia Type- general with ASA Monitors. Additional Monitors:   Airway Plan: ETT. Plan Factors-    Chart reviewed. Existing labs reviewed. Patient summary reviewed. Obstructive sleep apnea risk education given perioperatively. Induction- intravenous. Postoperative Plan- Plan for postoperative opioid use. Planned trial extubation    Informed Consent- Anesthetic plan and risks discussed with patient. I personally reviewed this patient with the CRNA. Discussed and agreed on the Anesthesia Plan with the CRNA. Karuna Beltran

## 2023-07-16 LAB
ANION GAP SERPL CALCULATED.3IONS-SCNC: 3 MMOL/L
BASOPHILS # BLD AUTO: 0.04 THOUSANDS/ÂΜL (ref 0–0.1)
BASOPHILS NFR BLD AUTO: 1 % (ref 0–1)
BUN SERPL-MCNC: 23 MG/DL (ref 5–25)
CALCIUM SERPL-MCNC: 8.5 MG/DL (ref 8.4–10.2)
CHLORIDE SERPL-SCNC: 102 MMOL/L (ref 96–108)
CO2 SERPL-SCNC: 33 MMOL/L (ref 21–32)
CREAT SERPL-MCNC: 0.55 MG/DL (ref 0.6–1.3)
EOSINOPHIL # BLD AUTO: 0.16 THOUSAND/ÂΜL (ref 0–0.61)
EOSINOPHIL NFR BLD AUTO: 2 % (ref 0–6)
ERYTHROCYTE [DISTWIDTH] IN BLOOD BY AUTOMATED COUNT: 14.6 % (ref 11.6–15.1)
GFR SERPL CREATININE-BSD FRML MDRD: 98 ML/MIN/1.73SQ M
GLUCOSE SERPL-MCNC: 152 MG/DL (ref 65–140)
HCT VFR BLD AUTO: 41.2 % (ref 34.8–46.1)
HGB BLD-MCNC: 13.3 G/DL (ref 11.5–15.4)
IMM GRANULOCYTES # BLD AUTO: 0.02 THOUSAND/UL (ref 0–0.2)
IMM GRANULOCYTES NFR BLD AUTO: 0 % (ref 0–2)
LYMPHOCYTES # BLD AUTO: 1.32 THOUSANDS/ÂΜL (ref 0.6–4.47)
LYMPHOCYTES NFR BLD AUTO: 19 % (ref 14–44)
MCH RBC QN AUTO: 30.1 PG (ref 26.8–34.3)
MCHC RBC AUTO-ENTMCNC: 32.3 G/DL (ref 31.4–37.4)
MCV RBC AUTO: 93 FL (ref 82–98)
MONOCYTES # BLD AUTO: 0.7 THOUSAND/ÂΜL (ref 0.17–1.22)
MONOCYTES NFR BLD AUTO: 10 % (ref 4–12)
NEUTROPHILS # BLD AUTO: 4.85 THOUSANDS/ÂΜL (ref 1.85–7.62)
NEUTS SEG NFR BLD AUTO: 68 % (ref 43–75)
NRBC BLD AUTO-RTO: 0 /100 WBCS
PLATELET # BLD AUTO: 160 THOUSANDS/UL (ref 149–390)
PMV BLD AUTO: 10.7 FL (ref 8.9–12.7)
POTASSIUM SERPL-SCNC: 3.6 MMOL/L (ref 3.5–5.3)
RBC # BLD AUTO: 4.42 MILLION/UL (ref 3.81–5.12)
SODIUM SERPL-SCNC: 138 MMOL/L (ref 135–147)
WBC # BLD AUTO: 7.09 THOUSAND/UL (ref 4.31–10.16)

## 2023-07-16 PROCEDURE — 85025 COMPLETE CBC W/AUTO DIFF WBC: CPT | Performed by: NURSE PRACTITIONER

## 2023-07-16 PROCEDURE — 99024 POSTOP FOLLOW-UP VISIT: CPT

## 2023-07-16 PROCEDURE — 99232 SBSQ HOSP IP/OBS MODERATE 35: CPT | Performed by: SURGERY

## 2023-07-16 PROCEDURE — 97163 PT EVAL HIGH COMPLEX 45 MIN: CPT

## 2023-07-16 PROCEDURE — 94760 N-INVAS EAR/PLS OXIMETRY 1: CPT

## 2023-07-16 PROCEDURE — 80048 BASIC METABOLIC PNL TOTAL CA: CPT

## 2023-07-16 PROCEDURE — 94640 AIRWAY INHALATION TREATMENT: CPT

## 2023-07-16 PROCEDURE — 99232 SBSQ HOSP IP/OBS MODERATE 35: CPT | Performed by: INTERNAL MEDICINE

## 2023-07-16 RX ORDER — BUDESONIDE 0.5 MG/2ML
0.5 INHALANT ORAL 2 TIMES DAILY
Status: DISCONTINUED | OUTPATIENT
Start: 2023-07-16 | End: 2023-07-16

## 2023-07-16 RX ORDER — BUDESONIDE 0.5 MG/2ML
0.5 INHALANT ORAL
Status: DISCONTINUED | OUTPATIENT
Start: 2023-07-16 | End: 2023-07-20 | Stop reason: HOSPADM

## 2023-07-16 RX ORDER — LEVALBUTEROL INHALATION SOLUTION 1.25 MG/3ML
1.25 SOLUTION RESPIRATORY (INHALATION)
Status: DISCONTINUED | OUTPATIENT
Start: 2023-07-16 | End: 2023-07-20 | Stop reason: HOSPADM

## 2023-07-16 RX ADMIN — CEFAZOLIN SODIUM 2000 MG: 2 SOLUTION INTRAVENOUS at 05:16

## 2023-07-16 RX ADMIN — BUDESONIDE 0.5 MG: 0.5 INHALANT ORAL at 13:33

## 2023-07-16 RX ADMIN — CEFAZOLIN SODIUM 2000 MG: 2 SOLUTION INTRAVENOUS at 21:38

## 2023-07-16 RX ADMIN — LEVALBUTEROL HYDROCHLORIDE 1.25 MG: 1.25 SOLUTION RESPIRATORY (INHALATION) at 19:12

## 2023-07-16 RX ADMIN — OXYCODONE HYDROCHLORIDE 5 MG: 5 TABLET ORAL at 23:35

## 2023-07-16 RX ADMIN — IPRATROPIUM BROMIDE 0.5 MG: 0.5 SOLUTION RESPIRATORY (INHALATION) at 13:33

## 2023-07-16 RX ADMIN — LEVALBUTEROL HYDROCHLORIDE 1.25 MG: 1.25 SOLUTION RESPIRATORY (INHALATION) at 13:33

## 2023-07-16 RX ADMIN — OXYCODONE HYDROCHLORIDE 5 MG: 5 TABLET ORAL at 05:16

## 2023-07-16 RX ADMIN — OXYCODONE HYDROCHLORIDE 5 MG: 5 TABLET ORAL at 15:31

## 2023-07-16 RX ADMIN — ENOXAPARIN SODIUM 30 MG: 30 INJECTION SUBCUTANEOUS at 21:39

## 2023-07-16 RX ADMIN — SENNOSIDES 17.2 MG: 8.6 TABLET, FILM COATED ORAL at 09:24

## 2023-07-16 RX ADMIN — IPRATROPIUM BROMIDE 0.5 MG: 0.5 SOLUTION RESPIRATORY (INHALATION) at 19:12

## 2023-07-16 RX ADMIN — METHOCARBAMOL TABLETS 500 MG: 500 TABLET, COATED ORAL at 12:40

## 2023-07-16 RX ADMIN — OXYCODONE HYDROCHLORIDE 5 MG: 5 TABLET ORAL at 09:15

## 2023-07-16 RX ADMIN — CEFAZOLIN SODIUM 1000 MG: 1 SOLUTION INTRAVENOUS at 05:16

## 2023-07-16 RX ADMIN — OXYCODONE HYDROCHLORIDE 5 MG: 5 TABLET ORAL at 00:00

## 2023-07-16 RX ADMIN — BUDESONIDE 0.5 MG: 0.5 INHALANT ORAL at 19:12

## 2023-07-16 RX ADMIN — ENOXAPARIN SODIUM 30 MG: 30 INJECTION SUBCUTANEOUS at 09:23

## 2023-07-16 RX ADMIN — CEFAZOLIN SODIUM 2000 MG: 2 SOLUTION INTRAVENOUS at 15:29

## 2023-07-16 NOTE — PLAN OF CARE
Problem: PHYSICAL THERAPY ADULT  Goal: Performs mobility at highest level of function for planned discharge setting. See evaluation for individualized goals. Description: Treatment/Interventions: LE strengthening/ROM, Functional transfer training, Elevations, Therapeutic exercise, Endurance training, Patient/family training, Equipment eval/education, Bed mobility, Gait training, Compensatory technique education  Equipment Recommended: Holger Juarez       See flowsheet documentation for full assessment, interventions and recommendations. 7/16/2023 1132 by Marline Garcia PT  Note: Prognosis: Good  Problem List: Decreased strength, Decreased endurance, Impaired balance, Decreased mobility, Decreased safety awareness, Decreased skin integrity, Orthopedic restrictions, Pain  Assessment: Adilene Gaxiola is a 74 y.o. Female who [facility] due to fall w/ L hip and wrist pain. POD 1: L femur IM nail, WABT LLE. Orders for PT eval and treat received, w/ activity orders of activity as tolerated. Comorbidities affecting pt's functional mobility at time of evaluation include: LLE weakness, COPD. Personal factors affecting DC include: inaccessible home environment, ambulating w/ assistive device, stairs to enter home, inability to ambulate household distances, inability to navigate level surfaces w/o external assistance and positive fall history. At baseline, pt mobilizes independently w/ no AD, and w/ 2 fall(s) in the previous 6 months. Upon evaluation, pt presents w/ the following deficits: impaired strength, impaired skin integrity, impaired balance, decreased endurance/activity tolerance, pain affecting mobility and gait deviations. Pt currently requires mod Ax1 for bed mobility, min Ax1 for transfers, mod Ax1 w/ RW for ambulation.  Pt's clinical presentation is unstable/unpredictable due to need for increased assistance w/ functional mobility compared to baseline, limited to 15'' of ambulation w/ RW, pain affecting mobility tolerance, declining O2 saturation w/ low levels of activity, requiring supplemental O2 in order to maintain O2 saturation, need for input for mobility technique, recent drastic decline in mobility status, recent h/o falls, ongoing medical management. From a PT/mobility standpoint given the above findings, DC recommendation is: Post-acute inpatient rehabilitation. During current admission, pt will benefit from continued skilled inpatient PT in the acute care setting in order to address the above deficits and to maximize function and mobility prior to DC from acute care. Barriers to Discharge: Inaccessible home environment     PT Discharge Recommendation: Post acute rehabilitation services    See flowsheet documentation for full assessment.

## 2023-07-16 NOTE — PROGRESS NOTES
[address] [facility]  Name: Chris Ochoa  MRN: [id number]  Unit/Bed#: W -01 I Date of Admission: 7/14/2023   Date of Service: 7/15/2023 I Hospital Day: 1    Assessment/Plan   Fall  Assessment & Plan  - Status post fall with the below noted injuries. - Fall precautions. - Geriatric Medicine consultation for evaluation, medication review and recommendations.  - PT and OT evaluation and treatment as indicated. - Case Management consultation for disposition planning. Closed nondisplaced fracture of greater trochanter of left femur (HCC)  Assessment & Plan  - Nondisplaced greater trochanteric fracture, present on admission.  - Status post ground level mechanical fall on 7/13. - Appreciate Orthopedic surgery evaluation, recommendations and interventions as noted. - Status post insertion IM nail left femur 7/15.   - Maintain NON weightbearing status on the left lower extremity.  - Monitor left lower extremity neurovascular exam.  - Continue multimodal analgesic regimen.  - Continue DVT prophylaxis. - PT and OT evaluation and treatment as indicated. - Outpatient follow up with Orthopedic surgery for re-evaluation. Weakness of left lower extremity  Assessment & Plan  - Reports left lower extremity weakness, ongoing x 1 year. - 2/5 strength with dorsiflexion and plantarflexion, no movement against gravity, in left lower extremity.  - No numbness, no saddle anesthesia, no bowel or bladder incontinence, normal rectal tone, post-void residual 0.   - 7/14 CT lumber spine:  No acute osseous abnormality. Degenerative change without high-grade canal or foraminal stenosis, most pronounced at L3-4 with mild canal and foraminal narrowing.    - Will consider MRI following neurosurgery evaluation.   - Consulted neurosurgery, appreciate recommendations.      COPD (chronic obstructive pulmonary disease) ([facility])  Assessment & Plan  - Evidence of COPD on CT chest in 2018   - No history of PFTs performed   - No home oxygen requirement   - Uses Primatene mist at home   - Will order Xopenex nebs PRN during hospitalization   - Patient is currently requiring 2 L NC to maintain saturations 88-90%   Pulmonary consulted and faby patient             Bowel Regimen: Senna   VTE Prophylaxis:Enoxaparin (Lovenox)     Disposition: Continue current level of care     Subjective   Chief Complaint: Fall     Subjective: Post-op check status post left IM nail placement to femur. Patient doing well. Resting comfortably in exam bed. Tolerating PO. Voiding spontaneously. Still reporting baseline weakness in her LLE but neurovascularly intact. Objective   Vitals:   Temp:  [97.3 °F (36.3 °C)-98.6 °F (37 °C)] 98.1 °F (36.7 °C)  HR:  [] 94  Resp:  [12-22] 14  BP: ()/() 127/89    I/O       07/14 0701  07/15 0700 07/15 0701  07/16 0700    P. O. 0 0    I.V. (mL/kg)  500 (10.5)    IV Piggyback  150    Total Intake(mL/kg) 0 (0) 650 (13.7)    Urine (mL/kg/hr) 0     Stool 0     Total Output 0     Net 0 +650          Unmeasured Urine Occurrence 0 x     Unmeasured Stool Occurrence 0 x            Physical Exam:   GENERAL APPEARANCE: NAD, resting comfortably in exam bed   NEURO: GCS 15, no focal neurological deficits, moving all extremities   HEENT: NCAT   CV: RRR  LUNGS: CTA, frequent wet coughing throughout encounter   GI: soft, non-tender   : voiding   MSK: LLE soft, compressible, no pain with passive stretch, weakness with dorsiflexion that is baseline, NV intact with 2+ DP pulses, sensation to light touch intact   SKIN: warm, dry, dressing to left proximal thigh is clean dry intact     Invasive Devices     Peripheral Intravenous Line  Duration           Peripheral IV 07/14/23 Right Forearm 1 day                      Lab Results: Results: I have personally reviewed all pertinent laboratory/tests results  Imaging: I have personally reviewed pertinent reports.      Other Studies:

## 2023-07-16 NOTE — QUICK NOTE
Neurology contacted regarding ongoing LLE weakness. Per chart review, patient presented initially on 7/14 with L hip and L wrist pain s/p mechanical fall. She was found to have L greater trochanteric fracture. Ortho evaluated patient. Patient underwent insertion of IM nail L femur on 7/15. Of note, at the time of admission, patient had reported chronic LLE weakness ongoing x 1 year. CT lumbar spine was completed and demonstrated "degenerative change without high-grade canal or foraminal stenosis, most pronounced at L3-4 with mild canal and foraminal narrowing". MRI lumbar spine w/wo contrast completed and showed "minor noncompressive lumbar degenerative change, no cauda equina or foraminal nerve impingement". Neurosurgery evaluated the patient and no neurosurgical intervention was indicated at that time. Attending neurologist discussed case with primary team, who notes the LLE weakness has been ongoing for a year per patient and is stable at this time, therefore, will recommend LLE EMG outpatient and will set up follow up with outpatient neurology for further evaluation. No further inpatient neurology recommendations at this time. Please call with any questions. Jason Wyatt will need follow up in in 6 weeks with neuromuscular attending/AP. She will require a EMG/NCS within 6 weeks.

## 2023-07-16 NOTE — PROGRESS NOTES
Progress Note - Pulmonary   Charlotte Fitting 74 y.o. female MRN: [id number]  Unit/Bed#: W -01 Encounter: [id number]    Assessment:  1. COPD, undetermined severity, with possible developing acute exacerbation. 2. Acute hypoxic respiratory failure, uncertain etiology. 3. Active smoker. Plan:  · Schedule nebulized regimen of atrovent/xopenex/budesonide  · Hold off on oral steroids for now  · Continue NRT  · Awaiting CXR read  · IS  · PT/OT  · Cannot wean O2 below 4L for now, sats borderline    Chief Complaint:   "I'm ok."    Subjective:   No complaints, but persistent hypoxemia post-extubation. New wheezing on exam.  Patient producing phlegm with her cough. Objective:     Vitals: Blood pressure 114/88, pulse 99, temperature 98.2 °F (36.8 °C), resp. rate 16, height 5' 2" (1.575 m), weight 45.8 kg (100 lb 15.5 oz), SpO2 91 %. ,Body mass index is 18.47 kg/m². Intake/Output Summary (Last 24 hours) at 7/16/2023 1315  Last data filed at 7/16/2023 0546  Gross per 24 hour   Intake 850 ml   Output --   Net 850 ml       Invasive Devices     Peripheral Intravenous Line  Duration           Peripheral IV 07/14/23 Right Forearm 1 day                Physical Exam:   General:  No acute distress. Alert and oriented x 3. HEENT:  PERRL.  MMM. Chest:  Some wheezing on left, less so on right. Cardiovascular:  S1 + S2, RRR, no M/R/G. Abdomen:  Soft, nontender, nondistended, no palpable masses or organomegaly. Extremities:  No significant edema. Neuro:  No focal deficits, grossly intact. Psych:  Normal mood and affect. Labs: I have personally reviewed pertinent lab results.   Imaging and other studies: I have personally reviewed pertinent films in PACS

## 2023-07-16 NOTE — ASSESSMENT & PLAN NOTE
- Evidence of COPD on CT chest in 2018   - No history of PFTs performed   - No home oxygen requirement   - Uses Primatene mist at home   - Will order Xopenex nebs PRN during hospitalization   - Patient is currently requiring 2 L NC to maintain saturations 88-90%   - Pulmonary consulted for pre-operative clearance and deem patient high risk secondary to COPD

## 2023-07-16 NOTE — ASSESSMENT & PLAN NOTE
- Nondisplaced greater trochanteric fracture, present on admission.  - Status post ground level mechanical fall on 7/13. - Appreciate Orthopedic surgery evaluation, recommendations and interventions as noted. - Status post insertion IM nail left femur 7/15.   - Maintain NON weightbearing status on the left lower extremity.  - Monitor left lower extremity neurovascular exam.  - Continue multimodal analgesic regimen.  - Continue DVT prophylaxis. - PT and OT evaluation and treatment as indicated. - Outpatient follow up with Orthopedic surgery for re-evaluation.

## 2023-07-16 NOTE — PHYSICAL THERAPY NOTE
PHYSICAL THERAPY EVALUATION NOTE          Patient Name: Ute Wright  HBPGJ'S Date: 2023          AGE:   74 y.o. Mrn:   [id number]  ADMIT DX:  Hip pain [M25.559]  Left leg weakness [R29.898]  Greater trochanter fracture ([address]) [S72.113A]  Closed nondisplaced fracture of greater trochanter of left femur, initial encounter ([address]) [S72.115A]  Weakness of left lower extremity [R29.898]    Past Medical History:  History reviewed. No pertinent past medical history. Past Surgical History:  History reviewed. No pertinent surgical history. Length Of Stay: 2        PHYSICAL THERAPY EVALUATION:    Patient's identity confirmed via 2 patient identifiers (full name and ) at start of session       23 0951   PT Last Visit   PT Visit Date 23   Note Type   Note type Evaluation   Pain Assessment   Pain Assessment Tool 0-10   Pain Score 8   Pain Location/Orientation Orientation: Left; Location: Leg;Location: Hip   Pain Onset/Description Frequency: Intermittent   Effect of Pain on Daily Activities limits ease of mobility   Hospital Pain Intervention(s) Cold applied;Repositioned; Ambulation/increased activity  (LPN pre-medicated pt)   Restrictions/Precautions   Weight Bearing Precautions Per Order Yes   LLE Weight Bearing Per Order WBAT   Braces or Orthoses   (pt wearing L wrist brace upon arrival, reports she got it in [employer])   Other Precautions Chair Alarm; Bed Alarm;WBS;Fall Risk;Pain;O2  (4L O2 via NC)   Home Living   Type of [address] [place] [address] One level;Performs ADLs on one level; Able to live on main level with bedroom/bathroom;Stairs to enter with rails  (5 LOLA)   Bathroom Shower/Tub Walk-in shower  (shower stall)   [facility]   (none per pt)   Bathroom Accessibility Accessible;Accessible via walker   [address] 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 Per chart review, pt w/ h/o LLE ankle weakness/foot drop for about a year w/ increased severity approx 4 months ago; however, pt reports symptoms only started this past May post "Vincent Horse"   Family/Caregiver Present No   Cognition   Overall Cognitive Status WFL  (appears WFL in conversation)   Arousal/Participation Cooperative   Orientation Level Oriented X4   Memory Decreased short term memory;Decreased recall of recent events   Following Commands Follows one step commands without difficulty   Comments Pt ID via name and ; pt agreeable to PT eval and OOB mobility, pt pleasant and motivated throughout. Asking questions regarding phlegm, drop foot, breathing throughout eval w/ education provided   Subjective   Subjective "my  says it started a year ago, but it was this May"   LLE Assessment   LLE Assessment X  (L great toe extension (2-/5))   Strength LLE   L Knee Flexion 3-/5   L Knee Extension 3-/5   L Ankle Dorsiflexion 1/5   L Ankle Plantar Flexion 2-/5   Vision-Basic Assessment   Current Vision Wears glasses all the time   Light Touch   RLE Light Touch Grossly intact  (grossly intact at this time, pt reports h/o numbness/tingling)   LLE Light Touch Grossly intact   Bed Mobility   Supine to Sit 3  Moderate assistance   Additional items Assist x 1;HOB elevated; Bedrails; Increased time required;Verbal cues;LE management  (to pt' R)   Additional Comments pt able to maintain sitting balance at EOB w/ supervision   Transfers   Sit to Stand 4  Minimal assistance   Additional items Assist x 1; Increased time required;Verbal cues   Stand to Sit 4  Minimal assistance   Additional items Assist x 1; Armrests; Increased time required;Verbal cues   Additional Comments VC for hand placement   Ambulation/Elevation   Gait pattern Improper Weight shift; Forward Flexion;Decreased foot clearance;Decreased L stance; Short stride;L Knee Eduar; Excessively slow;Decreased hip extension;Decreased heel strike;Decreased toe off  (LLE foot drop, decreased LLE step length)   Gait Assistance 3  Moderate assist   Additional items Assist x 1;Verbal cues   Assistive Device Rolling walker   Distance 15'   Ambulation/Elevation Additional Comments VC for RW mangement, to maintain closer proximity to RW while mobilizing   Balance   Static Sitting Fair +   Dynamic Sitting Fair   Static Standing Poor +  (w/ RW)   Dynamic Standing Poor   Ambulatory Poor  (w/ RW)   Endurance Deficit   Endurance Deficit Yes   Endurance Deficit Description limited overall activity tolerance - SpO2 dropped to 81% during/post mobility, breathing technique eduation provided w/ O2 recovering to 91% w/in approx 5-6 min   Activity Tolerance   Activity Tolerance Patient limited by fatigue;Patient limited by pain   Nurse Made Aware RAMSES Hoffman  (updated of O2 sat post and updated Trauma)   Assessment   Prognosis Good   Problem List Decreased strength;Decreased endurance; Impaired balance;Decreased mobility; Decreased safety awareness;Decreased skin integrity;Orthopedic restrictions;Pain   Assessment Gabby Garcia is a 74 y.o. Female who [facility] on 7/14/23 due to fall w/ L hip and wrist pain. POD 1: L femur IM nail, WABT LLE. Orders for PT eval and treat received, w/ activity orders of activity as tolerated. Comorbidities affecting pt's functional mobility at time of evaluation include: LLE weakness, COPD.  Personal factors affecting DC include: inaccessible home environment, ambulating w/ assistive device, stairs to enter home, inability to ambulate household distances, inability to navigate level surfaces w/o external assistance and positive fall history. At baseline, pt mobilizes independently w/ no AD, and w/ 2 fall(s) in the previous 6 months. Upon evaluation, pt presents w/ the following deficits: impaired strength, impaired skin integrity, impaired balance, decreased endurance/activity tolerance, pain affecting mobility and gait deviations. Pt currently requires mod Ax1 for bed mobility, min Ax1 for transfers, mod Ax1 w/ RW for ambulation. Pt's clinical presentation is unstable/unpredictable due to need for increased assistance w/ functional mobility compared to baseline, limited to 15'' of ambulation w/ RW, pain affecting mobility tolerance, declining O2 saturation w/ low levels of activity, requiring supplemental O2 in order to maintain O2 saturation, need for input for mobility technique, recent drastic decline in mobility status, recent h/o falls, ongoing medical management. From a PT/mobility standpoint given the above findings, DC recommendation is: Post-acute inpatient rehabilitation. During current admission, pt will benefit from continued skilled inpatient PT in the acute care setting in order to address the above deficits and to maximize function and mobility prior to DC from acute care.    Barriers to Discharge Inaccessible home environment   Goals   Patient Goals pt stated she wants to be able to go back to work   STG Expiration Date 07/26/23   Short Term Goal #1 Pt will: perform bed mobility w/ mod I to decrease pt's burden of care and increase pt's independence w/ repositioning in bed; perform transfers w/ mod I to promote increased OOB mobility; ambulate at least 150' w/ LRAD and mod I to increase pt's ambulatory endurance/tolerance; negotiate 5 stair(s) w/ UE support and supervision to facilitate pt returning to previous living environment; increase all balance ratings by at least 1 grade to decrease pt's risk of falls   PT Treatment Day 0   Plan   Treatment/Interventions LE strengthening/ROM; Functional transfer training;Elevations; Therapeutic exercise; Endurance training;Patient/family training;Equipment eval/education; Bed mobility;Gait training; Compensatory technique education   PT Frequency 4-6x/wk   Recommendation   PT Discharge Recommendation Post acute rehabilitation services   Equipment Recommended [facility] Recommended Wheeled walker   AM-PAC Basic Mobility Inpatient   Turning in Flat Bed Without Bedrails 3   Lying on Back to Sitting on Edge of Flat Bed Without Bedrails 2   Moving Bed to Chair 3   Standing Up From Chair Using Arms 3   Walk in Room 2   Climb 3-5 Stairs With Railing 1   Basic Mobility Inpatient Raw Score 14   Basic Mobility Standardized Score 35.55   Highest Level Of Mobility   -[facility] Goal 4: Move to chair/commode   -HLM Achieved 6: Walk 10 steps or more   End of Consult   Patient Position at End of Consult Bedside chair;Bed/Chair alarm activated; All needs within reach       The patient's AM-PAC Basic Mobility Inpatient Short Form Raw Score is 14. A Raw score of less than or equal to 16 suggests the patient may benefit from discharge to post-acute rehabilitation services. Please also refer to the recommendation of the Physical Therapist for safe discharge planning.     Pt will benefit from skilled inpatient PT during this admission in order to facilitate progress towards goals and to maximize functional independence prior to [facility] rec: post acute rehab        Zach Whitlock PT, DPT  07/16/23

## 2023-07-16 NOTE — PROGRESS NOTES
Progress Note - Orthopedics   Serge Dance 74 y.o. female MRN: [id number]  Unit/Bed#: W -01      Subjective:    74 y. o.female s/p left femoral IMN on 7/15. She is sitting up in bed this morning in no acute distress. No acute events, no new complaints. Patient doing well. Some continued left hip pain with movement, however otherwise pain well controlled. Denies fevers, chills, CP, SOB, N/V, numbness or tingling. Patient reports no issues with urination or bowel movements. Patient states she feels like her left knee needs to "crack", otherwise denies significant pain. She has not worked with PT/OT yet. She continues with inability to actively dorsiflex left foot, which has been present for about 1 year. She notes the weakness occurred and has persisted after experiencing a "Vincent Horse" in that leg last May.      Labs:  0   Lab Value Date/Time    HCT 41.2 07/16/2023 0445    HCT 44.9 07/15/2023 0447    HCT 44.1 07/14/2023 2041    HGB 13.3 07/16/2023 0445    HGB 14.8 07/15/2023 0447    HGB 14.7 07/14/2023 2041    INR 1.04 07/14/2023 2041    WBC 7.09 07/16/2023 0445    WBC 7.48 07/15/2023 0447    WBC 7.87 07/14/2023 2041    ESR 16 09/27/2018 1031       Meds:    Current Facility-Administered Medications:   •  acetaminophen (TYLENOL) tablet 975 mg, 975 mg, Oral, Q6H PRN, Lorri Hernandez PA-C  •  ceFAZolin (ANCEF) IVPB (premix in dextrose) 2,000 mg 50 mL, 2,000 mg, Intravenous, Q8H, Lorri Hernandez PA-C, Stopped at 07/16/23 0546  •  enoxaparin (LOVENOX) subcutaneous injection 30 mg, 30 mg, Subcutaneous, Q12H DEBBIE, Lorri Hernandez PA-C, 30 mg at 07/15/23 2205  •  lactated ringers bolus 1,000 mL, 1,000 mL, Intravenous, Once PRN **AND** lactated ringers bolus 1,000 mL, 1,000 mL, Intravenous, Once PRN, Lorri Hernandez PA-C  •  levalbuterol (XOPENEX) inhalation solution 1.25 mg, 1.25 mg, Nebulization, Q4H PRN, Lorri Hernandez PA-C  •  methocarbamol (ROBAXIN) tablet 500 mg, 500 mg, Oral, Q6H PRN, Lorri Hernandez PA-C  •  nicotine (NICODERM CQ) 7 mg/24hr TD 24 hr patch 7 mg, 7 mg, Transdermal, Daily, Lorri Hernandez PA-C  •  ondansetron (ZOFRAN) injection 4 mg, 4 mg, Intravenous, Q4H PRN, Lorri Hernandez PA-C  •  oxyCODONE (ROXICODONE) IR tablet 5 mg, 5 mg, Oral, Q4H PRN, Lorri Hernandez PA-C, 5 mg at 07/16/23 6968  •  oxyCODONE (ROXICODONE) split tablet 2.5 mg, 2.5 mg, Oral, Q4H PRN, Lorri Hernandez PA-C  •  senna (SENOKOT) tablet 17.2 mg, 2 tablet, Oral, Daily, Lorri Hernandez PA-C  •  sodium chloride 0.9 % bolus 1,000 mL, 1,000 mL, Intravenous, Once PRN **AND** sodium chloride 0.9 % bolus 1,000 mL, 1,000 mL, Intravenous, Once PRN, Lorri Hernandez PA-C    Blood Culture:   No results found for: "BLOODCX"    Wound Culture:   No results found for: "WOUNDCULT"    Ins and Outs:  I/O last 24 hours: In: 850 [I.V.:500; IV Piggyback:350]  Out: -       Physical:  Vitals:    07/16/23 0759   BP: 106/73   Pulse: (!) 112   Resp: 18   Temp: 98.9 °F (37.2 °C)   SpO2: (!) 88%     Musculoskeletal: left Lower Extremity  · Skin intacted . No erythema or ecchymosis. Dressing c/d/i  · Kristi-incisional TTP  · Sensation intact to saphenous, sural, tibial, superficial peroneal nerve, and deep peroneal  · Motor intact to +FHL/EHL on right, +ankle dorsi/plantar flexion right  · Unable to actively dorsiflex left foot with 2/5 strength; left EHL 3/5  · Atrophy noted in left lower extremity musculature  · 2+ DP pulse, symmetric bilaterally  · Digits warm and well perfused  · Capillary refill < 2 seconds  · Compartments soft and compressible    Assessment:    74 y. o.female s/p left femoral IMN on 7/15. Patient doing well. Pain well controlled    Plan:  · WBAT left lower extremity  · Hbg 13.3.  Will monitor for ABLA and administer IVF/prbc as indicated for Greater than 2 gram drop or Hgb < 7  · PT/OT  · Analgesics per primary team  · DVT ppx per primary team  · Dispo: Ortho will follow   · Follow up outpatient in 2 weeks with Dr. Arnold Koroma PA-C

## 2023-07-16 NOTE — ASSESSMENT & PLAN NOTE
- Reports left lower extremity weakness, ongoing x 1 year. - 2/5 strength with dorsiflexion and plantarflexion, no movement against gravity, in left lower extremity.  - No numbness, no saddle anesthesia, no bowel or bladder incontinence, normal rectal tone, post-void residual 0.   - 7/14 CT lumber spine:  No acute osseous abnormality. Degenerative change without high-grade canal or foraminal stenosis, most pronounced at L3-4 with mild canal and foraminal narrowing.    - 7/14 MRI lumbar spine: Minor noncompressive lumbar degenerative change. No cauda equina or foraminal nerve impingement. - Consulted neurosurgery, appreciate recommendations.    - No acute neurosurgical intervention indicated at this time   - Inability to dorsiflex with a foot drop is unexplained by the CT and MRI completed this admission.   - No other focal neurological deficits at this time     - Can consider outpatient EMG to determine if a peripheral nerve issue.

## 2023-07-16 NOTE — PROGRESS NOTES
[address]  Progress Note  Name: Margo Arteaga  MRN: [id number]  Unit/Bed#: W -01 I Date of Admission: 7/14/2023   Date of Service: 7/16/2023 I Hospital Day: 2    Assessment/Plan   Fall  Assessment & Plan  - Status post fall with the below noted injuries. - Fall precautions. - Geriatric Medicine consultation for evaluation, medication review and recommendations.  - PT and OT evaluation and treatment as indicated. - Case Management consultation for disposition planning. Closed nondisplaced fracture of greater trochanter of left femur (HCC)  Assessment & Plan  - Nondisplaced greater trochanteric fracture, present on admission.  - Status post ground level mechanical fall on 7/13. - Appreciate Orthopedic surgery evaluation, recommendations and interventions as noted. - Status post insertion IM nail left femur 7/15.   - Maintain NON weightbearing status on the left lower extremity.  - Monitor left lower extremity neurovascular exam.  - Continue multimodal analgesic regimen.  - Continue DVT prophylaxis. - PT and OT evaluation and treatment as indicated. - Outpatient follow up with Orthopedic surgery for re-evaluation. Weakness of left lower extremity  Assessment & Plan  - Reports left lower extremity weakness, ongoing x 1 year. - 2/5 strength with dorsiflexion and plantarflexion, no movement against gravity, in left lower extremity.  - No numbness, no saddle anesthesia, no bowel or bladder incontinence, normal rectal tone, post-void residual 0.   - 7/14 CT lumber spine:  No acute osseous abnormality. Degenerative change without high-grade canal or foraminal stenosis, most pronounced at L3-4 with mild canal and foraminal narrowing.    - 7/14 MRI lumbar spine: Minor noncompressive lumbar degenerative change. No cauda equina or foraminal nerve impingement.   - Consulted neurosurgery, appreciate recommendations.    - No acute neurosurgical intervention indicated at this time   - Inability to dorsiflex with a foot drop is unexplained by the CT and MRI completed this admission.   - No other focal neurological deficits at this time     - Can consider outpatient EMG to determine if a peripheral nerve issue. COPD (chronic obstructive pulmonary disease) ([facility])  Assessment & Plan  - Evidence of COPD on CT chest in 2018   - No history of PFTs performed   - No home oxygen requirement   - Uses Primatene mist at home   - Will order Xopenex nebs PRN during hospitalization   - Patient is currently requiring 2 L NC to maintain saturations 88-90%   - Pulmonary consulted for pre-operative clearance and deem patient high risk secondary to COPD              Bowel Regimen: Senna   VTE Prophylaxis:Sequential compression device (Venodyne)  and Enoxaparin (Lovenox)     Disposition: Continue current level of care. Needs PT/OT evaluation. Subjective   Chief Complaint: Status post fall with greater trochanteric fracture     Subjective: No acute concerns. Tolerating PO. Voiding spontaneously. Objective   Vitals:   Temp:  [97.3 °F (36.3 °C)-98.9 °F (37.2 °C)] 98.9 °F (37.2 °C)  HR:  [] 97  Resp:  [12-22] 14  BP: ()/() 117/71    I/O       07/14 0701  07/15 0700 07/15 0701  07/16 0700    P. O. 0 0    I.V. (mL/kg)  500 (10.5)    IV Piggyback  250    Total Intake(mL/kg) 0 (0) 750 (15.8)    Urine (mL/kg/hr) 0     Stool 0     Total Output 0     Net 0 +750          Unmeasured Urine Occurrence 0 x 1 x    Unmeasured Stool Occurrence 0 x 0 x           Physical Exam:   GENERAL APPEARANCE: NAD, resting comfortably in exam bed   NEURO: GCS 15, no focal neurological deficits, moving all extremities    HEENT: NCAT   CV: RRR   LUNGS: CTA, frequent coughing   GI: soft, non-tender   : voiding   MSK: LLE weakness with dorsiflexion, 5/5 strength with plantarflexion, neurovascularly intact with 2+ DP pulses; LLE is soft, compressible, no pain with passive stretch   SKIN: dressing to left proximal thigh is clean dry intact     Invasive Devices     Peripheral Intravenous Line  Duration           Peripheral IV 07/14/23 Right Forearm 1 day                      Lab Results: Results: I have personally reviewed all pertinent laboratory/tests results  Imaging: I have personally reviewed pertinent reports.      Other Studies:

## 2023-07-17 ENCOUNTER — APPOINTMENT (INPATIENT)
Dept: RADIOLOGY | Facility: HOSPITAL | Age: 68
DRG: 210 | End: 2023-07-17
Payer: OTHER MISCELLANEOUS

## 2023-07-17 ENCOUNTER — APPOINTMENT (INPATIENT)
Dept: VASCULAR ULTRASOUND | Facility: HOSPITAL | Age: 68
DRG: 210 | End: 2023-07-17
Payer: OTHER MISCELLANEOUS

## 2023-07-17 ENCOUNTER — APPOINTMENT (INPATIENT)
Dept: CT IMAGING | Facility: HOSPITAL | Age: 68
DRG: 210 | End: 2023-07-17
Payer: OTHER MISCELLANEOUS

## 2023-07-17 LAB
ANION GAP SERPL CALCULATED.3IONS-SCNC: 2 MMOL/L
BUN SERPL-MCNC: 18 MG/DL (ref 5–25)
CALCIUM SERPL-MCNC: 8.7 MG/DL (ref 8.4–10.2)
CHLORIDE SERPL-SCNC: 98 MMOL/L (ref 96–108)
CO2 SERPL-SCNC: 36 MMOL/L (ref 21–32)
CREAT SERPL-MCNC: 0.47 MG/DL (ref 0.6–1.3)
GFR SERPL CREATININE-BSD FRML MDRD: 103 ML/MIN/1.73SQ M
GLUCOSE SERPL-MCNC: 110 MG/DL (ref 65–140)
POTASSIUM SERPL-SCNC: 4.3 MMOL/L (ref 3.5–5.3)
SODIUM SERPL-SCNC: 136 MMOL/L (ref 135–147)

## 2023-07-17 PROCEDURE — 99024 POSTOP FOLLOW-UP VISIT: CPT | Performed by: PHYSICIAN ASSISTANT

## 2023-07-17 PROCEDURE — 80048 BASIC METABOLIC PNL TOTAL CA: CPT

## 2023-07-17 PROCEDURE — 99232 SBSQ HOSP IP/OBS MODERATE 35: CPT | Performed by: STUDENT IN AN ORGANIZED HEALTH CARE EDUCATION/TRAINING PROGRAM

## 2023-07-17 PROCEDURE — 97167 OT EVAL HIGH COMPLEX 60 MIN: CPT

## 2023-07-17 PROCEDURE — 94760 N-INVAS EAR/PLS OXIMETRY 1: CPT

## 2023-07-17 PROCEDURE — 71046 X-RAY EXAM CHEST 2 VIEWS: CPT

## 2023-07-17 PROCEDURE — 94640 AIRWAY INHALATION TREATMENT: CPT

## 2023-07-17 PROCEDURE — 93970 EXTREMITY STUDY: CPT | Performed by: SURGERY

## 2023-07-17 PROCEDURE — 93970 EXTREMITY STUDY: CPT

## 2023-07-17 PROCEDURE — 71275 CT ANGIOGRAPHY CHEST: CPT

## 2023-07-17 PROCEDURE — 73560 X-RAY EXAM OF KNEE 1 OR 2: CPT

## 2023-07-17 PROCEDURE — G1004 CDSM NDSC: HCPCS

## 2023-07-17 RX ADMIN — CEFAZOLIN SODIUM 2000 MG: 2 SOLUTION INTRAVENOUS at 05:11

## 2023-07-17 RX ADMIN — CEFAZOLIN SODIUM 2000 MG: 2 SOLUTION INTRAVENOUS at 13:45

## 2023-07-17 RX ADMIN — IPRATROPIUM BROMIDE 0.5 MG: 0.5 SOLUTION RESPIRATORY (INHALATION) at 07:17

## 2023-07-17 RX ADMIN — BUDESONIDE 0.5 MG: 0.5 INHALANT ORAL at 07:17

## 2023-07-17 RX ADMIN — IPRATROPIUM BROMIDE 0.5 MG: 0.5 SOLUTION RESPIRATORY (INHALATION) at 20:22

## 2023-07-17 RX ADMIN — IOHEXOL 100 ML: 350 INJECTION, SOLUTION INTRAVENOUS at 18:28

## 2023-07-17 RX ADMIN — ENOXAPARIN SODIUM 30 MG: 30 INJECTION SUBCUTANEOUS at 21:40

## 2023-07-17 RX ADMIN — BUDESONIDE 0.5 MG: 0.5 INHALANT ORAL at 20:22

## 2023-07-17 RX ADMIN — CEFAZOLIN SODIUM 2000 MG: 2 SOLUTION INTRAVENOUS at 21:40

## 2023-07-17 RX ADMIN — LEVALBUTEROL HYDROCHLORIDE 1.25 MG: 1.25 SOLUTION RESPIRATORY (INHALATION) at 07:17

## 2023-07-17 RX ADMIN — LEVALBUTEROL HYDROCHLORIDE 1.25 MG: 1.25 SOLUTION RESPIRATORY (INHALATION) at 20:22

## 2023-07-17 RX ADMIN — ENOXAPARIN SODIUM 30 MG: 30 INJECTION SUBCUTANEOUS at 09:38

## 2023-07-17 RX ADMIN — SENNOSIDES 17.2 MG: 8.6 TABLET, FILM COATED ORAL at 09:38

## 2023-07-17 RX ADMIN — LEVALBUTEROL HYDROCHLORIDE 1.25 MG: 1.25 SOLUTION RESPIRATORY (INHALATION) at 13:29

## 2023-07-17 RX ADMIN — IPRATROPIUM BROMIDE 0.5 MG: 0.5 SOLUTION RESPIRATORY (INHALATION) at 13:29

## 2023-07-17 NOTE — PROGRESS NOTES
Progress Note - Orthopedics   Ute Enriquezacher 74 y.o. female MRN: [id number]  Unit/Bed#: W -01      Subjective:    74 y. o.female patient seen and evaluated at bedside. Complains of some left sided hip and leg pain, radiates to the knee. No other complaints today.      Labs:  0   Lab Value Date/Time    HCT 41.2 07/16/2023 0445    HCT 44.9 07/15/2023 0447    HCT 44.1 07/14/2023 2041    HGB 13.3 07/16/2023 0445    HGB 14.8 07/15/2023 0447    HGB 14.7 07/14/2023 2041    INR 1.04 07/14/2023 2041    WBC 7.09 07/16/2023 0445    WBC 7.48 07/15/2023 0447    WBC 7.87 07/14/2023 2041    ESR 16 09/27/2018 1031       Meds:    Current Facility-Administered Medications:   •  acetaminophen (TYLENOL) tablet 975 mg, 975 mg, Oral, Q6H PRN, Lorri Hernandez PA-C  •  budesonide (PULMICORT) inhalation solution 0.5 mg, 0.5 mg, Nebulization, BID, Natasha Flowers DO, 0.5 mg at 07/17/23 7036  •  ceFAZolin (ANCEF) IVPB (premix in dextrose) 2,000 mg 50 mL, 2,000 mg, Intravenous, Q8H, Lorri Hernandez PA-C, Stopped at 07/17/23 6722  •  enoxaparin (LOVENOX) subcutaneous injection 30 mg, 30 mg, Subcutaneous, Q12H [address], Lorri Hernandez PA-C, 30 mg at 07/17/23 1246  •  ipratropium (ATROVENT) 0.02 % inhalation solution 0.5 mg, 0.5 mg, Nebulization, TID, Jevon Odell MD, 0.5 mg at 07/17/23 7176  •  levalbuterol (XOPENEX) inhalation solution 1.25 mg, 1.25 mg, Nebulization, TID, Jevon Odell MD, 1.25 mg at 07/17/23 7223  •  methocarbamol (ROBAXIN) tablet 500 mg, 500 mg, Oral, Q6H PRN, Lorri Hernandez PA-C, 500 mg at 07/16/23 1240  •  nicotine (NICODERM CQ) 7 mg/24hr TD 24 hr patch 7 mg, 7 mg, Transdermal, Daily, Lorri Hernandez PA-C  •  ondansetron (ZOFRAN) injection 4 mg, 4 mg, Intravenous, Q4H PRN, Lorri Hernandez PA-C  •  oxyCODONE (ROXICODONE) IR tablet 5 mg, 5 mg, Oral, Q4H PRN, Lorri Hernandez PA-C, 5 mg at 07/16/23 7631  •  oxyCODONE (ROXICODONE) split tablet 2.5 mg, 2.5 mg, Oral, Q4H PRN, Lorri Hernandez PA-C  •  senna (SENOKOT) tablet 17.2 mg, 2 tablet, Oral, Daily, Lorri Hernandez PA-C, 17.2 mg at 07/17/23 7417    Blood Culture:   No results found for: "BLOODCX"    Wound Culture:   No results found for: "WOUNDCULT"    Ins and Outs:  I/O last 24 hours: In: 118.3 [IV Piggyback:118.3]  Out: -           Physical:  Vitals:    07/17/23 1121   BP: (!) 144/101   Pulse: (!) 108   Resp: 17   Temp:    SpO2: 92%     Musculoskeletal: left Lower Extremity  · Surgical dressings c/d/i without significant strike through  · Moderate gagandeep incisional ttp. · Sensation intact to saphenous, sural, tibial, superficial peroneal nerve, and deep peroneal  · Motor intact to +FHL/EHL, +ankle plantar flexion, she has +foot drop   · 2+ DP pulse, symmetric bilaterally  · Digits warm and well perfused  · Capillary refill < 2 seconds    Assessment:    74 y. o.female s/p insertion intramedullary nail left femur with Dr. Loyce Cowden 7/15/2023. Patient doing well. Plan:  · WBAT to the left lower extremity. · Given patients ongoing difficulty with foot drop/ankle dorsiflexion weakness, suggest multipodus boot/bracing - patient may benefit to avoid chronic flexion contracture. Defer to neurosurgery/primary team.   · Hgb 13.3. Will monitor for ABLA and administer IVF/prbc as indicated for Greater than 2 gram drop or Hgb < 7  · PT/OT  · Pain control per primary team.   · DVT ppx: lovenox while admitted, suggest aspirin 325mg BID upon discharge. · Medical management per primary team.   · Dispo: Ortho will follow   · Patient will need follow up with Dr. Loyce Cowden upon discharge.      Hodan Chopra PA-C

## 2023-07-17 NOTE — PLAN OF CARE
Problem: OCCUPATIONAL THERAPY ADULT  Goal: Performs self-care activities at highest level of function for planned discharge setting. See evaluation for individualized goals. Description: Treatment Interventions: ADL retraining, Functional transfer training, Endurance training, Cognitive reorientation, Patient/family training, Equipment evaluation/education, Compensatory technique education, Activityengagement, Energy conservation          See flowsheet documentation for full assessment, interventions and recommendations. Note: Limitation: Decreased ADL status, Decreased Safe judgement during ADL, Decreased endurance, Decreased self-care trans, Decreased high-level ADLs, Decreased fine motor control, Decreased UE strength (impaired pain, balance, fxnl mobility, act malena, standing malena, strength, safety awareness, insight, sequencing)  Prognosis: Good  Assessment: Pt is a 74 y.o. female seen for OT evaluation s/p [facility]14/2023 due to fall. Personal and env factors supporting pt at time of IE include age, (I) PLOF, attitude towards recovery, accessible home environment and FFSU. Personal and env factors inhibiting engagement in occupations include difficulty completing ADLs and difficulty completing IADLs. Performance deficits that affect the pt’s occupational performance can be seen above. Due to pt's current functional limitations and medical complications pt is functioning below baseline. Pt would benefit from continued skilled OT treatment in order to maximize safety, independence and overall performance with ADLs, functional mobility, functional transfers and cognition in order to achieve highest level of function.      OT Discharge Recommendation: Post acute rehabilitation services

## 2023-07-17 NOTE — ASSESSMENT & PLAN NOTE
- Evidence of COPD on CT chest in 2018   - No history of PFTs performed   - No home oxygen requirement   - Uses Primatene mist at home   - Pulmonary following, appreciate recommendations   - Continue scheduled nebulized regimen of Atrovent/Xopenex/Budesonide    - Patient is currently requiring 4 L NC to maintain saturations 88-90%   - Unable to wean oxygen at this time   - Continue to hold off on oral steroids for now   - Encourage incentive spirometer use

## 2023-07-17 NOTE — CASE MANAGEMENT
Case Management Assessment & Discharge Planning Note    Patient name Vincent Marie  Location W /W -01 MRN [id number]  : 1955 Date 2023       Current Admission Date: 2023  Current Admission Diagnosis:Fall   Patient Active Problem List    Diagnosis Date Noted   • Fall 2023   • Closed nondisplaced fracture of greater trochanter of left femur ([address]) 2023   • Weakness of left lower extremity 2023   • COPD (chronic obstructive pulmonary disease) ([address]) 2023      LOS (days): 3  Geometric Mean LOS (GMLOS) (days):   Days to GMLOS:     OBJECTIVE:    Risk of Unplanned Readmission Score: 7.37         Current admission status: Inpatient       Preferred Pharmacy:   [address] [place], [place] - [address] [address]  Phone: [phone] Fax: [phone]    Primary Care Provider: Elda Muro MD    Primary Insurance:   Secondary Insurance:     ASSESSMENT:  [address] [facility], [address] [facility] Representative - Spouse   Primary Phone: [phone] (Mobile)                         Readmission Root Cause  30 Day Readmission: No    Patient Information  Admitted from[de-identified] Home  Mental Status: Alert  During Assessment patient was accompanied by: Not accompanied during assessment  Assessment information provided by[de-identified] Patient  Primary Caregiver: Self  Support Systems: Spouse/significant other, Family members  [place] of Residence: [address] do you live in?: [address] entry access options.  Select all that apply.: Stairs  Number of steps to enter home.: 5  Do the steps have railings?: Yes  Type of Current Residence: [employer]  In the last 12 months, was there a time when you were not able to pay the mortgage or rent on time?: No  In the last 12 months, was there a time when you did not have a steady place to sleep or slept in a shelter (including now)?: No  Living Arrangements: Lives w/ Spouse/significant other  Is patient a ?: No    Activities of Daily Living Prior to Admission  Functional Status: Independent  Completes ADLs independently?: Yes  Ambulates independently?: Yes  Does patient use assisted devices?: No  Does patient currently own DME?: Yes  What DME does the patient currently own?: Wheelchair  Does patient have a history of Outpatient Therapy (PT/OT)?: Yes  Does the patient have a history of Short-Term Rehab?: No  Does patient have a history of HHC?: No  Does patient currently have [facility]?: No         Patient Information Continued  Income Source: SSI/SSD  Does patient have prescription coverage?: Yes  Within the past 12 months, you worried that your food would run out before you got the money to buy more.: Never true  Within the past 12 months, the food you bought just didn't last and you didn't have money to get more.: Never true  Does patient receive dialysis treatments?: No  Does patient have a history of substance abuse?: No  Does patient have a history of Mental Health Diagnosis?: No         Means of Transportation  Means of Transport to Appts[de-identified] Drives Self  In the past 12 months, has lack of transportation kept you from medical appointments or from getting medications?: No  In the past 12 months, has lack of transportation kept you from meetings, work, or from getting things needed for daily living?: No    CM reviewed the availability of treatment team to discuss questions or concerns patient and/or family may have regarding  understanding medications and recognizing signs and symptoms at discharge. CM also encouraged patient to follow up with all recommended appointments after discharge. CM reviewed the information that will be provided to pt/family on the discharge instructions. Patient advised of importance for patient and family to participate in managing patient's medical well being.       DISCHARGE DETAILS:    Discharge planning discussed with[de-identified] pt  Freedom of Choice: Yes  Comments - Freedom of Choice: CM met with pt to review the recommendations of the care team for rehab. Pt discussed OP therapy however then decided that inpt rehab would be best as she would need too much help at home and does not want her  to have to help her that much. Pt is in agreement with inpt rehab. [place] Letters will be needed from pt's insurance prior to DC. CM contacted family/caregiver?: No- see comments  Were Treatment Team discharge recommendations reviewed with patient/caregiver?: Yes  Did patient/caregiver verbalize understanding of patient care needs?: Yes  Were patient/caregiver advised of the risks associated with not following Treatment Team discharge recommendations?: Yes         Requested [address]         Is the patient interested in [facility] at discharge?: No    DME Referral Provided  Referral made for DME?: No    Other Referral/Resources/Interventions Provided:  Interventions: Short Term Rehab  Referral Comments: Pt is requesting a blanket referral be made for rehab as this will generate a list of facilities that she will be able to choose from for rehab. She is hoping she will be able to go to [facility] as this is the closest facility to her home. Referrals have been made and cm will follow up with pt once list has been obtained.     Would you like to participate in our [address] service program?  : No - Declined    Treatment Team Recommendation: Short Term Rehab  Discharge Destination Plan[de-identified] Short Term Rehab

## 2023-07-17 NOTE — ASSESSMENT & PLAN NOTE
- Nondisplaced greater trochanteric fracture, present on admission.  - Status post ground level mechanical fall on 7/13. - Appreciate Orthopedic surgery evaluation, recommendations and interventions as noted. - Status post insertion IM nail left femur 7/15.   - WBAT to left lower extremity.  - Monitor left lower extremity neurovascular exam.  - Continue multimodal analgesic regimen.  - Continue DVT prophylaxis. - PT and OT evaluation and treatment as indicated. - Outpatient follow up with Orthopedic surgery in 2 weeks with Dr. Cristal Clayton for re-evaluation.

## 2023-07-17 NOTE — CASE MANAGEMENT
Case Management Discharge Planning Note    Patient name Harsha Brice  Location W /W -01 MRN [id number]  : 1955 Date 2023       Current Admission Date: 2023  Current Admission Diagnosis:Fall   Patient Active Problem List    Diagnosis Date Noted   • Fall 2023   • Closed nondisplaced fracture of greater trochanter of left femur ([address] ) 2023   • Weakness of left lower extremity 2023   • COPD (chronic obstructive pulmonary disease) ([address] ) 2023      LOS (days): 3  Geometric Mean LOS (GMLOS) (days):   Days to GMLOS:     OBJECTIVE:  Risk of Unplanned Readmission Score: 7.39         Current admission status: Inpatient   Preferred Pharmacy:   [address] [place], [place] - [address] [place] [address]  Phone: [phone] Fax: [phone]    Primary Care Provider: Anam Pena MD    Primary Insurance:   Secondary Insurance:     DISCHARGE DETAILS:     CM met with pt and received her insurance card. Cm copied this and emailed it to the hospital financial counselors. A copy was uploaded to [address] [place] [address] as well for rehab facilities to run insurance. CM discussed making referrals to Acute rehab as well and discussed the different locations of the facilities. Pt requested referrals be made to [facility] and [facility]. Referrals have been made. CM will continue to follow.

## 2023-07-17 NOTE — ASSESSMENT & PLAN NOTE
- Reports left lower extremity weakness, ongoing x 1 year. - 2/5 strength with dorsiflexion and plantarflexion, no movement against gravity, in left lower extremity.  - No numbness, no saddle anesthesia, no bowel or bladder incontinence, normal rectal tone, post-void residual 0.   - 7/14 CT lumber spine:  No acute osseous abnormality. Degenerative change without high-grade canal or foraminal stenosis, most pronounced at L3-4 with mild canal and foraminal narrowing.    - 7/14 MRI lumbar spine: Minor noncompressive lumbar degenerative change. No cauda equina or foraminal nerve impingement. - Consulted neurosurgery, appreciate recommendations.    - No acute neurosurgical intervention indicated at this time   - Inability to dorsiflex with a foot drop is unexplained by the CT and MRI completed this admission.   - No other focal neurological deficits at this time     - Can consider outpatient EMG to determine if a peripheral nerve issue.  - Follow up in 6 weeks with neuromuscular attending/AP. She will require a EMG/NCS within 6 weeks.

## 2023-07-17 NOTE — PROGRESS NOTES
[facility]  Progress Note  Name: Chris Ochoa  MRN: [id number]  Unit/Bed#: W -01 I Date of Admission: 7/14/2023   Date of Service: 7/17/2023 I Hospital Day: 3    Assessment/Plan   Fall  Assessment & Plan  - Status post fall with the below noted injuries. - Fall precautions. - Geriatric Medicine consultation for evaluation, medication review and recommendations.  - PT and OT evaluation and treatment as indicated. - Case Management consultation for disposition planning. Closed nondisplaced fracture of greater trochanter of left femur (HCC)  Assessment & Plan  - Nondisplaced greater trochanteric fracture, present on admission.  - Status post ground level mechanical fall on 7/13. - Appreciate Orthopedic surgery evaluation, recommendations and interventions as noted. - Status post insertion IM nail left femur 7/15.   - WBAT to left lower extremity.  - Monitor left lower extremity neurovascular exam.  - Continue multimodal analgesic regimen.  - Continue DVT prophylaxis. - PT and OT evaluation and treatment as indicated. - Outpatient follow up with Orthopedic surgery in 2 weeks with Dr. Malgorzata Álvarez for re-evaluation. Weakness of left lower extremity  Assessment & Plan  - Reports left lower extremity weakness, ongoing x 1 year. - 2/5 strength with dorsiflexion and plantarflexion, no movement against gravity, in left lower extremity.  - No numbness, no saddle anesthesia, no bowel or bladder incontinence, normal rectal tone, post-void residual 0.   - 7/14 CT lumber spine:  No acute osseous abnormality. Degenerative change without high-grade canal or foraminal stenosis, most pronounced at L3-4 with mild canal and foraminal narrowing.    - 7/14 MRI lumbar spine: Minor noncompressive lumbar degenerative change. No cauda equina or foraminal nerve impingement.   - Consulted neurosurgery, appreciate recommendations.    - No acute neurosurgical intervention indicated at this time   - Inability to dorsiflex with a foot drop is unexplained by the CT and MRI completed this admission.   - No other focal neurological deficits at this time     - Can consider outpatient EMG to determine if a peripheral nerve issue.  - Follow up in 6 weeks with neuromuscular attending/AP. She will require a EMG/NCS within 6 weeks. COPD (chronic obstructive pulmonary disease) ([facility])  Assessment & Plan  - Evidence of COPD on CT chest in 2018   - No history of PFTs performed   - No home oxygen requirement   - Uses Primatene mist at home   - Pulmonary following, appreciate recommendations   - Continue scheduled nebulized regimen of Atrovent/Xopenex/Budesonide    - Patient is currently requiring 4 L NC to maintain saturations 88-90%   - Unable to wean oxygen at this time   - Continue to hold off on oral steroids for now   - Encourage incentive spirometer use             Bowel Regimen: Senna   VTE Prophylaxis:Enoxaparin (Lovenox)     Disposition:  Will need placement to rehab facility     Subjective   Chief Complaint:  Left greater trochanteric femur fracture, status post ORIF     Subjective:  Reports pain to superior and medial aspect of left knee, and intermittent "twitching" sensation throughout her left leg, most prominent in the left knee; full passive ROM, neurovascularly intact, has been able to ambulate       Objective   Vitals:   Temp:  [98.2 °F (36.8 °C)-98.9 °F (37.2 °C)] 98.2 °F (36.8 °C)  HR:  [] 99  Resp:  [16-20] 17  BP: (106-136)/(73-88) 109/83    I/O       07/15 0701  07/16 0700 07/16 0701  07/17 0700 07/17 0701  07/18 0700    P. O. 0      I.V. (mL/kg) 500 (10.9)      IV Piggyback 350 118.3     Total Intake(mL/kg) 850 (18.6) 118.3 (2.5)     Urine (mL/kg/hr)       Stool       Total Output       Net +850 +118.3            Unmeasured Urine Occurrence 1 x      Unmeasured Stool Occurrence 0 x             Physical Exam:   GENERAL APPEARANCE: NAD, resting comfortably in exam bed   NEURO: GCS 15, no focal neurological deficits, moving all extremities    HEENT: NCAT   CV: RRR   LUNGS: CTA, frequent coughing, NC in place    GI: soft, non-tender   : voiding   MSK: LLE weakness with dorsiflexion consistent with foot drop, 5/5 strength with plantarflexion, neurovascularly intact with 2+ DP pulses; LLE is soft, compressible, no pain with passive stretch; no concerning findings to represent compartment syndrome    SKIN: dressing to left proximal thigh is clean dry intact     Invasive Devices     Peripheral Intravenous Line  Duration           Peripheral IV 07/14/23 Right Forearm 2 days                      Lab Results: Results: reviewed  Imaging: I have personally reviewed pertinent reports.      Other Studies:

## 2023-07-17 NOTE — UTILIZATION REVIEW
Please note, all determination MUST be either called into [phone] or faxed to [phone]:   [facility], [address] [place] [address]  Tax ID: [id number]  NPI: [id number]   ATTENDING PROVIDER:  Attending Name and NPI#: Rudy Blanchardland [[id number]]  Address: [address] [place] [address], [address] [place] [address]  Phone: [phone]     ADMISSION INFORMATION:  [facility]   Place of Service Code: 21  Inpatient Admission Date/Time: 7/14/23  8:18 PM  Discharge Date/Time: No discharge date for patient encounter. Admitting Diagnosis Code/Description:  Hip pain [M25.559]  Left leg weakness [R29.898]  Greater trochanter fracture ([address]) [S72.113A]  Closed nondisplaced fracture of greater trochanter of left femur, initial encounter ([address]) [S72.115A]  Weakness of left lower extremity [R29.898]     UTILIZATION REVIEW CONTACT:  Jae Hutson Utilization   Network Utilization Review Department  Phone: [phone]  Fax: [phone]  Email: [email or web address]  Contact for approvals/pending authorizations, clinical reviews, and discharge. PHYSICIAN ADVISORY SERVICES:  Medical Necessity Denial & [employer] Review  Phone: [phone]  Fax: [phone]  Email: [email or web address]             Fide Montenegro RN  Registered Nurse  Specialty:  Utilization Review  Utilization Review      Addendum  Date of Service:  7/15/2023 12:40 PM     Expand All Collapse All    Initial Clinical Review     Admission: Date/Time/Statement:       Admission Orders (From admission, onward)       Ordered         07/14/23 2018   Inpatient Admission  Once                               Orders Placed This Encounter   Procedures   • Inpatient Admission       Standing Status:   Standing       Number of Occurrences:   1       Order Specific Question:   Level of Care     Answer:   Med Surg [16]       Order Specific Question:   Estimated length of stay       Answer:   More than 2 Midnights       Order Specific Question:   Certification       Answer:   I certify that inpatient services are medically necessary for this patient for a duration of greater than two midnights. See H&P and MD Progress Notes for additional information about the patient's course of treatment.               ED Arrival Information               Expected   -    Arrival   7/14/2023 12:44    Acuity   Urgent              Means of arrival   Walk-In    Escorted by   Family Member    Service   Trauma    Admission type   Emergency              Arrival complaint   Hip Pain, Doctor sent her here for CAT-Scan                  Chief Complaint   Patient presents with   • Hip Pain       PT presents to ED after a fall yesterday and c/o left wrist pain (xrays = badly bruised) and hip appears to not be broke, but was sent here for a CT (PT was provided a script)         Initial Presentation: 74 y.o. female from home to ED, per PCP, admitted inpatient due to  Fall/fracture of greater trochanter left femur/weakness of LLE/COPD. Presented due to left hip pain starting day prior to arrival after a fall, left wrist pain. Seen at urgent care and possible fracture of left greater trochanter and sent to ED. No wrist fracture. Has had LLE weakness the past year, feels inocente at times causing falls. On exam:  Tenderness over left greater trochanter. 2 out of 5 strength left hip flexor, 2/5 strength left knee extension.  2/5 strength left foot dorsiflexion, 3 out of 5 left foot plantarflexion. Glucose 218. Ct lumbar spine showed degenerative changes. Ct LLE showed fracture of left femur greater trochanter. In the ED sat to 86% room air and placed on oxygen and given neb. Plan is consult Geriatrics, PT/OT. Consult Orthopedics. Pain control. Consult neurosurgery.   Continue nebs and consult Pulmonary.      Date: 7/15/23  Day 2:  Has left wrist pain and ecchymosis, left hip pain. On exam: Skin intact small amount remote ecchymosis over left aspect of the greater trochanter, limb shortened and externally rotated, tender to palpation left hip.  some mild ecchymosis over the left wrist at the base of the thumb and  some tenderness. Continue nebs. Operative intervention. Pain control        7/15/23 per pulmonary - patient with COPD, unknown severity not in exacerbation/acute hypoxic respiratory failure/active smoker. Patient is cleared for surgery. Recommend atrovent/xopenex q8h, should have an additional treatment 30 minutes prior to intubation. Nicotine patch. CxR.      7/15/23 per Orthopedics: patient is status post fall with left intertrochanteric fracture. Plan is non weight bearing LLE, analgesia as needed. NPO. OR for IM nail femur fracture.    Splint left wrist.  Wbat as tolerated left wrist.  Spine follow up.       Procedure 7/15/23 Left - INSERTION NAIL IM FEMUR ANTEGRADE (TROCHANTERIC)            ED Triage Vitals [07/14/23 1313]   Temperature Pulse Respirations Blood Pressure SpO2   98.6 °F (37 °C) 95 18 (!) 168/108 95 %       Temp Source Heart Rate Source Patient Position - Orthostatic VS BP Location FiO2 (%)   Oral Monitor Sitting Right arm --       Pain Score           8                  Wt Readings from Last 1 Encounters:   07/14/23 47.6 kg (105 lb)      Additional Vital Signs:   07/15/23 07:16:35 98.5 °F (36.9 °C) 109 Abnormal  18 140/96 111 91 % -- -- -- --   07/14/23 2339 -- -- -- -- -- 90 % 28 2 L/min Nasal cannula --   07/14/23 2227 -- -- -- -- -- -- 28 2 L/min Nasal cannula --   07/14/23 2130 98.6 °F (37 °C) 112 Abnormal  20 148/98 -- -- -- -- -- --   07/14/23 21:27:24 -- 112 Abnormal  -- 148/98 115 90 % -- -- -- --   07/14/23 1847 -- 112 Abnormal  21 179/91 Abnormal  128 92 % -- -- None (Room air) --   07/14/23 1633 -- 116 Abnormal    22 175/96 Abnormal  -- 92 % -- -- None (Room air) Sitting Pulse: patient just returned from bathroom at 07/14/23 1633      Pertinent Labs/Diagnostic Test Results:   MRI lumbar spine w wo contrast   Final Result by Ramon Alarcon DO (07/15 9291)       Minor noncompressive lumbar degenerative change. No cauda equina or foraminal nerve impingement.           Workstation performed: [id number]           CT spine lumbar without contrast   Final Result by Jacqueline Mann MD (07/14 1918)       1. No acute osseous abnormality.    2.  Degenerative change without high-grade canal or foraminal stenosis as detailed, most pronounced at L3-4 with mild canal and foraminal narrowing.           Workstation performed: [id number]           CT lower extremity wo contrast left   Final Result by Theodore Joseph MD (07/14 1636)       Acute, nondisplaced fracture of the left femur greater trochanter (series 601 images 50-64.)           Workstation performed: [id number]                 Results from last 7 days   Lab Units 07/15/23  0447 07/14/23  2041   WBC Thousand/uL 7.48 7.87   HEMOGLOBIN g/dL 14.8 14.7   HEMATOCRIT % 44.9 44.1   PLATELETS Thousands/uL 179 174   NEUTROS ABS Thousands/µL 5.38 5.98            Results from last 7 days   Lab Units 07/15/23  0447 07/14/23  2041   SODIUM mmol/L 138 136   POTASSIUM mmol/L 3.6 3.6   CHLORIDE mmol/L 104 101   CO2 mmol/L 27 29   ANION GAP mmol/L 7 6   BUN mg/dL 24 18   CREATININE mg/dL 0.54* 0.64   EGFR ml/min/1.73sq m 99 93   CALCIUM mg/dL 8.8 8.9            Results from last 7 days   Lab Units 07/15/23  0447 07/14/23  2041   GLUCOSE RANDOM mg/dL 96 218*           Results from last 7 days   Lab Units 07/14/23  2041   PROTIME seconds 13.8   INR   1.04   PTT seconds 31      ED Treatment:              Medication Administration from 07/14/2023 1244 to 07/14/2023 2121        Date/Time Order Dose Route Action Comments       07/14/2023 1917 EDT levalbuterol (XOPENEX) inhalation solution 1.25 mg 1.25 mg Nebulization Given --          Medical History History reviewed. No pertinent past medical history. Present on Admission:  **None**        Admitting Diagnosis: Hip pain [M25.559]  Left leg weakness [R29.898]  Greater trochanter fracture (HCC) [S72.113A]  Closed nondisplaced fracture of greater trochanter of left femur, initial encounter ([facility]) [S72.115A]  Weakness of left lower extremity [R29.898]  Age/Sex: 74 y.o. female  Admission Orders:  07/14/23 2018 inpatient   Scheduled Medications:  enoxaparin, 30 mg, Subcutaneous, Q12H DEBBIE  ipratropium, 0.5 mg, Nebulization, TID  levalbuterol, 1.25 mg, Nebulization, TID  nicotine, 7 mg, Transdermal, Daily  senna, 2 tablet, Oral, Daily        Continuous IV Infusions: none   PRN Meds: not used   acetaminophen, 975 mg, Oral, Q6H PRN  levalbuterol, 1.25 mg, Nebulization, Q4H PRN  methocarbamol, 500 mg, Oral, Q6H PRN  ondansetron, 4 mg, Intravenous, Q4H PRN  oxyCODONE, 5 mg, Oral, Q4H PRN  oxyCODONE, 2.5 mg, Oral, Q4H PRN     Neurovascular checks every 4 hours.      IP CONSULT TO ORTHOPEDIC SURGERY     IP CONSULT TO NEUROSURGERY  IP CONSULT TO PULMONOLOGY     Network Utilization Review Department  ATTENTION: Please call with any questions or concerns to [phone] and carefully listen to the prompts so that you are directed to the right person. All voicemails are confidential.  Marianna Laguerre all requests for admission clinical reviews, approved or denied determinations and any other requests to dedicated fax number below belonging to the campus where the patient is receiving treatment.  List of dedicated fax numbers for the Facilities:  [place] DENIALS (Administrative/Medical Necessity) [phone]   [address] (Maternity/NICU/Pediatrics) [phone]   [address] [phone]   [facility] [phone]   [address] [phone]   [address] - Anabel Given [phone]   [address] [place] [address] [phone]   [address]  [facility] [phone]                 Revision History

## 2023-07-17 NOTE — PLAN OF CARE
Problem: MOBILITY - ADULT  Goal: Maintain or return to baseline ADL function  Description: INTERVENTIONS:  -  Assess patient's ability to carry out ADLs; assess patient's baseline for ADL function and identify physical deficits which impact ability to perform ADLs (bathing, care of mouth/teeth, toileting, grooming, dressing, etc.)  - Assess/evaluate cause of self-care deficits   - Assess range of motion  - Assess patient's mobility; develop plan if impaired  - Assess patient's need for assistive devices and provide as appropriate  - Encourage maximum independence but intervene and supervise when necessary  - Involve family in performance of ADLs  - Assess for home care needs following discharge   - Consider OT consult to assist with ADL evaluation and planning for discharge  - Provide patient education as appropriate  Outcome: Progressing  Goal: Maintains/Returns to pre admission functional level  Description: INTERVENTIONS:  - Perform BMAT or MOVE assessment daily.   - Set and communicate daily mobility goal to care team and patient/family/caregiver. - Collaborate with rehabilitation services on mobility goals if consulted  - Perform Range of Motion  times a day. - Reposition patient every  hours.   - Dangle patient  times a day  - Stand patient  times a day  - Ambulate patient  times a day  - Out of bed to chair  times a day   - Out of bed for meals  times a day  - Out of bed for toileting  - Record patient progress and toleration of activity level   Outcome: Progressing     Problem: PAIN - ADULT  Goal: Verbalizes/displays adequate comfort level or baseline comfort level  Description: Interventions:  - Encourage patient to monitor pain and request assistance  - Assess pain using appropriate pain scale  - Administer analgesics based on type and severity of pain and evaluate response  - Implement non-pharmacological measures as appropriate and evaluate response  - Consider cultural and social influences on pain and pain management  - Notify physician/advanced practitioner if interventions unsuccessful or patient reports new pain  Outcome: Progressing     Problem: INFECTION - ADULT  Goal: Absence or prevention of progression during hospitalization  Description: INTERVENTIONS:  - Assess and monitor for signs and symptoms of infection  - Monitor lab/diagnostic results  - Monitor all insertion sites, i.e. indwelling lines, tubes, and drains  - Monitor endotracheal if appropriate and nasal secretions for changes in amount and color  - [place] appropriate cooling/warming therapies per order  - Administer medications as ordered  - Instruct and encourage patient and family to use good hand hygiene technique  - Identify and instruct in appropriate isolation precautions for identified infection/condition  Outcome: Progressing  Goal: Absence of fever/infection during neutropenic period  Description: INTERVENTIONS:  - Monitor WBC    Outcome: Progressing     Problem: SAFETY ADULT  Goal: Maintain or return to baseline ADL function  Description: INTERVENTIONS:  -  Assess patient's ability to carry out ADLs; assess patient's baseline for ADL function and identify physical deficits which impact ability to perform ADLs (bathing, care of mouth/teeth, toileting, grooming, dressing, etc.)  - Assess/evaluate cause of self-care deficits   - Assess range of motion  - Assess patient's mobility; develop plan if impaired  - Assess patient's need for assistive devices and provide as appropriate  - Encourage maximum independence but intervene and supervise when necessary  - Involve family in performance of ADLs  - Assess for home care needs following discharge   - Consider OT consult to assist with ADL evaluation and planning for discharge  - Provide patient education as appropriate  Outcome: Progressing  Goal: Maintains/Returns to pre admission functional level  Description: INTERVENTIONS:  - Perform BMAT or MOVE assessment daily.   - Set and communicate daily mobility goal to care team and patient/family/caregiver. - Collaborate with rehabilitation services on mobility goals if consulted  - Perform Range of Motion  times a day. - Reposition patient every  hours.   - Dangle patient  times a day  - Stand patient  times a day  - Ambulate patient  times a day  - Out of bed to chair  times a day   - Out of bed for meals  times a day  - Out of bed for toileting  - Record patient progress and toleration of activity level   Outcome: Progressing  Goal: Patient will remain free of falls  Description: INTERVENTIONS:  - Educate patient/family on patient safety including physical limitations  - Instruct patient to call for assistance with activity   - Consult OT/PT to assist with strengthening/mobility   - Keep Call bell within reach  - Keep bed low and locked with side rails adjusted as appropriate  - Keep care items and personal belongings within reach  - Initiate and maintain comfort rounds  - Make Fall Risk Sign visible to staff  - Offer Toileting every  Hours, in advance of need  - Initiate/Maintain alarm  - Obtain necessary fall risk management equipment:   - Apply yellow socks and bracelet for high fall risk patients  - Consider moving patient to room near nurses station  Outcome: Progressing     Problem: DISCHARGE PLANNING  Goal: Discharge to home or other facility with appropriate resources  Description: INTERVENTIONS:  - Identify barriers to discharge w/patient and caregiver  - Arrange for needed discharge resources and transportation as appropriate  - Identify discharge learning needs (meds, wound care, etc.)  - Arrange for interpretive services to assist at discharge as needed  - Refer to Case Management Department for coordinating discharge planning if the patient needs post-hospital services based on physician/advanced practitioner order or complex needs related to functional status, cognitive ability, or social support system  Outcome: Progressing Problem: Knowledge Deficit  Goal: Patient/family/caregiver demonstrates understanding of disease process, treatment plan, medications, and discharge instructions  Description: Complete learning assessment and assess knowledge base.   Interventions:  - Provide teaching at level of understanding  - Provide teaching via preferred learning methods  Outcome: Progressing

## 2023-07-17 NOTE — OCCUPATIONAL THERAPY NOTE
Occupational Therapy Evaluation     Patient Name: Charlotte Balderrama  [id number] Date: 7/17/2023  Problem List  Active Problems:    Fall    Closed nondisplaced fracture of greater trochanter of left femur (HCC)    Weakness of left lower extremity    COPD (chronic obstructive pulmonary disease) ([address] )    Past Medical History  History reviewed. No pertinent past medical history. Past Surgical History  Past Surgical History:   Procedure Laterality Date    ID OPTX FEM SHFT FX W/INSJ IMED IMPLT W/WO SCREW Left 7/15/2023    Procedure: INSERTION NAIL IM FEMUR ANTEGRADE (TROCHANTERIC); Surgeon: Leah Cervantes DO;  Location: [facility];  Service: Orthopedics             07/17/23 1111   OT Last Visit   OT Visit Date 07/17/23   Note Type   Note type Evaluation   Pain Assessment   Pain Assessment Tool 0-10   Pain Score 7   Pain Location/Orientation Orientation: Left; Location: Leg   Hospital Pain Intervention(s) Repositioned; Ambulation/increased activity   Restrictions/Precautions   Weight Bearing Precautions Per Order Yes   LUE Weight Bearing Per Order   (no current WBS with LUE at this time, will clarify with trauma/ortho)   LLE Weight Bearing Per Order WBAT   Braces or Orthoses   (L wrist brace: provided by OP therapy in [place])   Other Precautions Chair Alarm; Bed Alarm;O2;Fall Risk;Pain;WBS   Home Living   Type of [address] [place] [address] One level;Stairs to enter with rails  (5 LOLA)   Bathroom Shower/Tub Walk-in shower   Bathroom Toilet Standard   Bathroom Equipment   (none)   [address]   Additional Comments no use of AD at baseline   Prior Function   Level of [place] Independent with ADLs   Lives With Spouse  (+ grandson)   Receives Help From Family   IADLs Independent with driving; Independent with meal prep; Independent with medication management   Falls in the last 6 months 1 to 4  (2)   Vocational Full time employment  (works at [employer] in [address] [place] [address] [address])   Lifestyle Autonomy PTA pt living with  in [facility], pt (I) with ADLs and IADLs, (+)falls, (+)drives, no use of AD at baseline   Reciprocal Relationships supportive , daughter is local   Service to Others works full time at [employer], reports that she is frequently doing heavy lifting   Intrinsic Gratification enjoys being independent and spending time with grandson   General   Additional Pertinent History Pt admitted with fall; found to have closed nondisplaced fracture of greater trochanter of left femur. Pt is currently POD #2 s/p IM nail L femur. Pt reports LLE weakness for >1 yr. Per ortho pt may benefit from multipodus boot/bracing to avoid chronic flexion contracture. Ortho defered to nsx and primary teams   Family/Caregiver Present Yes  ( and grandson)   ADL   Eating Assistance 7  Independent   Grooming Assistance 5  Supervision/Setup   Grooming Deficit Increased time to complete;Supervision/safety;Verbal cueing  (requesting that therapist bring washcloth to pt in sitting, declining trial to stand at sink)   UB Bathing Assistance 5  Supervision/Setup   LB Bathing Assistance 3  Moderate Assistance   UB Dressing Assistance 5  Supervision/Setup   LB Dressing Assistance 3  Moderate [address]  2  Maximal Assistance   Toileting Deficit Increased time to complete;Supervison/safety;Verbal cueing;Clothing management up;Clothing management down;Perineal hygiene  (A with underwear up/down. Able to complete hygiene in sitting)   Bed Mobility   Additional Comments OOB and in chair upon arrival and end of session   Transfers   Sit to Stand 3  Moderate assistance   Additional items Assist x 1; Increased time required;Verbal cues   Stand to Sit 4  Minimal assistance   Additional items Assist x 1; Increased time required;Verbal cues   Toilet transfer 4  Minimal assistance   Additional items Assist x 1; Increased time required;Verbal cues;Standard toilet  (use of grab bars)   Additional Comments VC for hand placement.  present and assisting with initial transfer   Functional Mobility   Functional Mobility 4  Minimal assistance   Additional Comments Ax1, bed >< bathroom, limited by overall endurance   Additional items Rolling walker   Balance   Static Sitting Fair +   Dynamic Sitting Fair   Static Standing Fair -   Dynamic Standing Poor +   Ambulatory Poor +   Activity Tolerance   Activity Tolerance Patient tolerated treatment well   Medical Staff Made Aware TED MURRAY Assessment   RUE Assessment WFL   LUE Assessment   LUE Assessment X  (WFL shoulder -> elbow. Wrist with sprain, wearing brace, noted extensive bruising on hand)   Hand Function   Hand Function Comments functional RUE, impaired LUE due to wrist sprain   Vision-Basic Assessment   Current Vision Wears glasses all the time   Cognition   Overall Cognitive Status [facility]   Arousal/Participation Alert; Cooperative   Attention Attends with cues to redirect   Orientation Level Oriented X4   Memory Within functional limits   Following Commands Follows one step commands without difficulty   Comments pleasant and cooperative   Assessment   Limitation Decreased ADL status; Decreased Safe judgement during ADL;Decreased endurance;Decreased self-care trans;Decreased high-level ADLs; Decreased fine motor control;Decreased UE strength  (impaired pain, balance, fxnl mobility, act malena, standing malena, strength, safety awareness, insight, sequencing)   Prognosis Good   Assessment Pt is a 74 y.o. female seen for OT evaluation s/p [facility] 7/14/2023 due to fall. Personal and env factors supporting pt at time of IE include age, (I) PLOF, attitude towards recovery, accessible home environment and FFSU. Personal and env factors inhibiting engagement in occupations include difficulty completing ADLs and difficulty completing IADLs. Performance deficits that affect the pt’s occupational performance can be seen above.  Due to pt's current functional limitations and medical complications pt is functioning below baseline. Pt would benefit from continued skilled OT treatment in order to maximize safety, independence and overall performance with ADLs, functional mobility, functional transfers and cognition in order to achieve highest level of function. Goals   Patient Goals to be able to go back to work   LTG Time Frame 10-14   Long Term Goal see goals listed below   Plan   Treatment Interventions ADL retraining;Functional transfer training; Endurance training;Cognitive reorientation;Patient/family training;Equipment evaluation/education; Compensatory technique education; Activityengagement; Energy conservation   Goal Expiration Date 07/27/23   OT Treatment Day 0   OT Frequency 3-5x/wk   Recommendation   OT Discharge Recommendation Post acute rehabilitation services   AM-PAC Daily Activity Inpatient   Lower Body Dressing 2   Bathing 2   Toileting 2   Upper Body Dressing 3   Grooming 3   Eating 3   Daily Activity Raw Score 15   Daily Activity Standardized Score (Calc for Raw Score >=11) 34.69   AM-PAC Applied Cognition Inpatient   Following a Speech/Presentation 3   Understanding Ordinary Conversation 4   Taking Medications 3   Remembering Where Things Are Placed or Put Away 3   Remembering List of 4-5 Errands 2   Taking Care of Complicated Tasks 2   Applied Cognition Raw Score 17   Applied Cognition Standardized Score 36.52   End of Consult   Patient Position at End of Consult Bedside chair;Bed/Chair alarm activated; All needs within reach     GOALS:      -Patient will perform grooming tasks standing at sink with overall Mod I in order to increase overall independence    -Patient will be Mod I with UB dressing using AE and AD as needed in order to increase (I) with ADLs    -Patient will be Mod I with UB bathing using AE and AD as needed in order to increase (I) with ADLs    -Patient will be Mod I with LB dressing with use of AE and AD as needed in order to increase (I) with ADLs    -Patient will be Mod I with LB bathing with use of AE and AD as needed in order to increase (I) with ADLs    -Patient will complete toileting w/ Mod I w/ G hygiene/thoroughness in order to reduce caregiver burden    -Patient will demonstrate Mod I with bed mobility for ability to manage own comfort and initiate OOB tasks.     -Patient will perform functional transfers with Mod I to/from all surfaces using DME as needed in order to increase (I) with functional tasks    -Patient will be Mod I with functional mobility to/from bathroom for increased independence with toileting tasks    -Patient will tolerate therapeutic activities for greater than 30 min, in order to increase tolerance for functional activities.     -Patient will demonstrate standing for 3 min with Mod I in order to increase active participation in functional activities    -Patient will demonstrate proper management of wrist brace with no VC in order to achieve protection and safety of joint      The patient's raw score on the -PAC Daily Activity Inpatient Short Form is 15. A raw score of less than 19 suggests the patient may benefit from discharge to post-acute rehabilitation services. Please refer to the recommendation of the Occupational Therapist for safe discharge planning.       Lilliam Farley MS, OTR/L

## 2023-07-17 NOTE — PLAN OF CARE
Problem: MOBILITY - ADULT  Goal: Maintain or return to baseline ADL function  Description: INTERVENTIONS:  -  Assess patient's ability to carry out ADLs; assess patient's baseline for ADL function and identify physical deficits which impact ability to perform ADLs (bathing, care of mouth/teeth, toileting, grooming, dressing, etc.)  - Assess/evaluate cause of self-care deficits   - Assess range of motion  - Assess patient's mobility; develop plan if impaired  - Assess patient's need for assistive devices and provide as appropriate  - Encourage maximum independence but intervene and supervise when necessary  - Involve family in performance of ADLs  - Assess for home care needs following discharge   - Consider OT consult to assist with ADL evaluation and planning for discharge  - Provide patient education as appropriate  Outcome: Progressing  Goal: Maintains/Returns to pre admission functional level  Description: INTERVENTIONS:  - Perform BMAT or MOVE assessment daily.   - Set and communicate daily mobility goal to care team and patient/family/caregiver. - Collaborate with rehabilitation services on mobility goals if consulted  - Perform Range of Motion  times a day. - Reposition patient every  hours.   - Dangle patient  times a day  - Stand patient  times a day  - Ambulate patient  times a day  - Out of bed to chair  times a day   - Out of bed for meals  times a day  - Out of bed for toileting  - Record patient progress and toleration of activity level   Outcome: Progressing     Problem: PAIN - ADULT  Goal: Verbalizes/displays adequate comfort level or baseline comfort level  Description: Interventions:  - Encourage patient to monitor pain and request assistance  - Assess pain using appropriate pain scale  - Administer analgesics based on type and severity of pain and evaluate response  - Implement non-pharmacological measures as appropriate and evaluate response  - Consider cultural and social influences on pain and pain management  - Notify physician/advanced practitioner if interventions unsuccessful or patient reports new pain  Outcome: Progressing     Problem: INFECTION - ADULT  Goal: Absence or prevention of progression during hospitalization  Description: INTERVENTIONS:  - Assess and monitor for signs and symptoms of infection  - Monitor lab/diagnostic results  - Monitor all insertion sites, i.e. indwelling lines, tubes, and drains  - Monitor endotracheal if appropriate and nasal secretions for changes in amount and color  - [place] appropriate cooling/warming therapies per order  - Administer medications as ordered  - Instruct and encourage patient and family to use good hand hygiene technique  - Identify and instruct in appropriate isolation precautions for identified infection/condition  Outcome: Progressing  Goal: Absence of fever/infection during neutropenic period  Description: INTERVENTIONS:  - Monitor WBC    Outcome: Progressing     Problem: SAFETY ADULT  Goal: Maintain or return to baseline ADL function  Description: INTERVENTIONS:  -  Assess patient's ability to carry out ADLs; assess patient's baseline for ADL function and identify physical deficits which impact ability to perform ADLs (bathing, care of mouth/teeth, toileting, grooming, dressing, etc.)  - Assess/evaluate cause of self-care deficits   - Assess range of motion  - Assess patient's mobility; develop plan if impaired  - Assess patient's need for assistive devices and provide as appropriate  - Encourage maximum independence but intervene and supervise when necessary  - Involve family in performance of ADLs  - Assess for home care needs following discharge   - Consider OT consult to assist with ADL evaluation and planning for discharge  - Provide patient education as appropriate  Outcome: Progressing  Goal: Maintains/Returns to pre admission functional level  Description: INTERVENTIONS:  - Perform BMAT or MOVE assessment daily.   - Set and communicate daily mobility goal to care team and patient/family/caregiver. - Collaborate with rehabilitation services on mobility goals if consulted  - Perform Range of Motion  times a day. - Reposition patient every  hours.   - Dangle patient  times a day  - Stand patient  times a day  - Ambulate patient  times a day  - Out of bed to chair  times a day   - Out of bed for meals  times a day  - Out of bed for toileting  - Record patient progress and toleration of activity level   Outcome: Progressing  Goal: Patient will remain free of falls  Description: INTERVENTIONS:  - Educate patient/family on patient safety including physical limitations  - Instruct patient to call for assistance with activity   - Consult OT/PT to assist with strengthening/mobility   - Keep Call bell within reach  - Keep bed low and locked with side rails adjusted as appropriate  - Keep care items and personal belongings within reach  - Initiate and maintain comfort rounds  - Make Fall Risk Sign visible to staff  - Offer Toileting every  Hours, in advance of need  - Initiate/Maintain alarm  - Obtain necessary fall risk management equipment:  - Apply yellow socks and bracelet for high fall risk patients  - Consider moving patient to room near nurses station  Outcome: Progressing     Problem: DISCHARGE PLANNING  Goal: Discharge to home or other facility with appropriate resources  Description: INTERVENTIONS:  - Identify barriers to discharge w/patient and caregiver  - Arrange for needed discharge resources and transportation as appropriate  - Identify discharge learning needs (meds, wound care, etc.)  - Arrange for interpretive services to assist at discharge as needed  - Refer to Case Management Department for coordinating discharge planning if the patient needs post-hospital services based on physician/advanced practitioner order or complex needs related to functional status, cognitive ability, or social support system  Outcome: Progressing Problem: Knowledge Deficit  Goal: Patient/family/caregiver demonstrates understanding of disease process, treatment plan, medications, and discharge instructions  Description: Complete learning assessment and assess knowledge base.   Interventions:  - Provide teaching at level of understanding  - Provide teaching via preferred learning methods  Outcome: Progressing

## 2023-07-18 PROBLEM — R73.9 HYPERGLYCEMIA: Status: ACTIVE | Noted: 2023-07-18

## 2023-07-18 PROBLEM — M79.605 LOWER EXTREMITY PAIN, LEFT: Status: ACTIVE | Noted: 2023-07-18

## 2023-07-18 LAB
ANION GAP SERPL CALCULATED.3IONS-SCNC: 29 MMOL/L
ANION GAP SERPL CALCULATED.3IONS-SCNC: 3 MMOL/L
BASOPHILS # BLD AUTO: 0.03 THOUSANDS/ÂΜL (ref 0–0.1)
BASOPHILS NFR BLD AUTO: 1 % (ref 0–1)
BUN SERPL-MCNC: 11 MG/DL (ref 5–25)
BUN SERPL-MCNC: 16 MG/DL (ref 5–25)
CALCIUM SERPL-MCNC: 6 MG/DL (ref 8.4–10.2)
CALCIUM SERPL-MCNC: 8.9 MG/DL (ref 8.4–10.2)
CHLORIDE SERPL-SCNC: 67 MMOL/L (ref 96–108)
CHLORIDE SERPL-SCNC: 98 MMOL/L (ref 96–108)
CO2 SERPL-SCNC: 26 MMOL/L (ref 21–32)
CO2 SERPL-SCNC: 34 MMOL/L (ref 21–32)
CREAT SERPL-MCNC: 0.44 MG/DL (ref 0.6–1.3)
CREAT SERPL-MCNC: 5.35 MG/DL (ref 0.6–1.3)
EOSINOPHIL # BLD AUTO: 0.12 THOUSAND/ÂΜL (ref 0–0.61)
EOSINOPHIL NFR BLD AUTO: 2 % (ref 0–6)
ERYTHROCYTE [DISTWIDTH] IN BLOOD BY AUTOMATED COUNT: 14.2 % (ref 11.6–15.1)
EST. AVERAGE GLUCOSE BLD GHB EST-MCNC: 126 MG/DL
GFR SERPL CREATININE-BSD FRML MDRD: 106 ML/MIN/1.73SQ M
GFR SERPL CREATININE-BSD FRML MDRD: 7 ML/MIN/1.73SQ M
GLUCOSE SERPL-MCNC: 126 MG/DL (ref 65–140)
GLUCOSE SERPL-MCNC: 134 MG/DL (ref 65–140)
GLUCOSE SERPL-MCNC: 239 MG/DL (ref 65–140)
GLUCOSE SERPL-MCNC: 241 MG/DL (ref 65–140)
GLUCOSE SERPL-MCNC: 792 MG/DL (ref 65–140)
HBA1C MFR BLD: 6 %
HCT VFR BLD AUTO: 37.9 % (ref 34.8–46.1)
HGB BLD-MCNC: 12.3 G/DL (ref 11.5–15.4)
IMM GRANULOCYTES # BLD AUTO: 0.01 THOUSAND/UL (ref 0–0.2)
IMM GRANULOCYTES NFR BLD AUTO: 0 % (ref 0–2)
LYMPHOCYTES # BLD AUTO: 0.73 THOUSANDS/ÂΜL (ref 0.6–4.47)
LYMPHOCYTES NFR BLD AUTO: 14 % (ref 14–44)
MCH RBC QN AUTO: 29.7 PG (ref 26.8–34.3)
MCHC RBC AUTO-ENTMCNC: 32.5 G/DL (ref 31.4–37.4)
MCV RBC AUTO: 92 FL (ref 82–98)
MONOCYTES # BLD AUTO: 0.48 THOUSAND/ÂΜL (ref 0.17–1.22)
MONOCYTES NFR BLD AUTO: 9 % (ref 4–12)
NEUTROPHILS # BLD AUTO: 3.79 THOUSANDS/ÂΜL (ref 1.85–7.62)
NEUTS SEG NFR BLD AUTO: 74 % (ref 43–75)
NRBC BLD AUTO-RTO: 0 /100 WBCS
PLATELET # BLD AUTO: 193 THOUSANDS/UL (ref 149–390)
PMV BLD AUTO: 10.4 FL (ref 8.9–12.7)
POTASSIUM SERPL-SCNC: 3 MMOL/L (ref 3.5–5.3)
POTASSIUM SERPL-SCNC: 3.7 MMOL/L (ref 3.5–5.3)
RBC # BLD AUTO: 4.14 MILLION/UL (ref 3.81–5.12)
SODIUM SERPL-SCNC: 122 MMOL/L (ref 135–147)
SODIUM SERPL-SCNC: 135 MMOL/L (ref 135–147)
WBC # BLD AUTO: 5.16 THOUSAND/UL (ref 4.31–10.16)

## 2023-07-18 PROCEDURE — 94640 AIRWAY INHALATION TREATMENT: CPT

## 2023-07-18 PROCEDURE — 97530 THERAPEUTIC ACTIVITIES: CPT

## 2023-07-18 PROCEDURE — 99024 POSTOP FOLLOW-UP VISIT: CPT | Performed by: PHYSICIAN ASSISTANT

## 2023-07-18 PROCEDURE — 80048 BASIC METABOLIC PNL TOTAL CA: CPT

## 2023-07-18 PROCEDURE — 82948 REAGENT STRIP/BLOOD GLUCOSE: CPT

## 2023-07-18 PROCEDURE — 97116 GAIT TRAINING THERAPY: CPT

## 2023-07-18 PROCEDURE — 99232 SBSQ HOSP IP/OBS MODERATE 35: CPT | Performed by: STUDENT IN AN ORGANIZED HEALTH CARE EDUCATION/TRAINING PROGRAM

## 2023-07-18 PROCEDURE — 97110 THERAPEUTIC EXERCISES: CPT

## 2023-07-18 PROCEDURE — 97535 SELF CARE MNGMENT TRAINING: CPT

## 2023-07-18 PROCEDURE — 80048 BASIC METABOLIC PNL TOTAL CA: CPT | Performed by: NURSE PRACTITIONER

## 2023-07-18 PROCEDURE — 85025 COMPLETE CBC W/AUTO DIFF WBC: CPT | Performed by: NURSE PRACTITIONER

## 2023-07-18 PROCEDURE — 94760 N-INVAS EAR/PLS OXIMETRY 1: CPT

## 2023-07-18 PROCEDURE — 83036 HEMOGLOBIN GLYCOSYLATED A1C: CPT | Performed by: STUDENT IN AN ORGANIZED HEALTH CARE EDUCATION/TRAINING PROGRAM

## 2023-07-18 RX ORDER — INSULIN LISPRO 100 [IU]/ML
1-6 INJECTION, SOLUTION INTRAVENOUS; SUBCUTANEOUS
Status: DISCONTINUED | OUTPATIENT
Start: 2023-07-18 | End: 2023-07-18

## 2023-07-18 RX ORDER — INSULIN LISPRO 100 [IU]/ML
1-6 INJECTION, SOLUTION INTRAVENOUS; SUBCUTANEOUS
Status: DISCONTINUED | OUTPATIENT
Start: 2023-07-18 | End: 2023-07-20 | Stop reason: HOSPADM

## 2023-07-18 RX ADMIN — LEVALBUTEROL HYDROCHLORIDE 1.25 MG: 1.25 SOLUTION RESPIRATORY (INHALATION) at 13:39

## 2023-07-18 RX ADMIN — CEFAZOLIN SODIUM 2000 MG: 2 SOLUTION INTRAVENOUS at 13:08

## 2023-07-18 RX ADMIN — BUDESONIDE 0.5 MG: 0.5 INHALANT ORAL at 19:56

## 2023-07-18 RX ADMIN — CEFAZOLIN SODIUM 2000 MG: 2 SOLUTION INTRAVENOUS at 06:36

## 2023-07-18 RX ADMIN — BUDESONIDE 0.5 MG: 0.5 INHALANT ORAL at 07:08

## 2023-07-18 RX ADMIN — IPRATROPIUM BROMIDE 0.5 MG: 0.5 SOLUTION RESPIRATORY (INHALATION) at 07:08

## 2023-07-18 RX ADMIN — INSULIN DETEMIR 18 UNITS: 100 INJECTION, SOLUTION SUBCUTANEOUS at 22:11

## 2023-07-18 RX ADMIN — CEFAZOLIN SODIUM 2000 MG: 2 SOLUTION INTRAVENOUS at 22:04

## 2023-07-18 RX ADMIN — LEVALBUTEROL HYDROCHLORIDE 1.25 MG: 1.25 SOLUTION RESPIRATORY (INHALATION) at 07:08

## 2023-07-18 RX ADMIN — IPRATROPIUM BROMIDE 0.5 MG: 0.5 SOLUTION RESPIRATORY (INHALATION) at 19:56

## 2023-07-18 RX ADMIN — ENOXAPARIN SODIUM 30 MG: 30 INJECTION SUBCUTANEOUS at 22:09

## 2023-07-18 RX ADMIN — LEVALBUTEROL HYDROCHLORIDE 1.25 MG: 1.25 SOLUTION RESPIRATORY (INHALATION) at 19:56

## 2023-07-18 RX ADMIN — IPRATROPIUM BROMIDE 0.5 MG: 0.5 SOLUTION RESPIRATORY (INHALATION) at 13:39

## 2023-07-18 RX ADMIN — SENNOSIDES 17.2 MG: 8.6 TABLET, FILM COATED ORAL at 09:39

## 2023-07-18 RX ADMIN — ENOXAPARIN SODIUM 30 MG: 30 INJECTION SUBCUTANEOUS at 09:39

## 2023-07-18 NOTE — PLAN OF CARE
Problem: OCCUPATIONAL THERAPY ADULT  Goal: Performs self-care activities at highest level of function for planned discharge setting. See evaluation for individualized goals. Description: Treatment Interventions: ADL retraining, Functional transfer training, Endurance training, Cognitive reorientation, Patient/family training, Equipment evaluation/education, Compensatory technique education, Activityengagement, Energy conservation          See flowsheet documentation for full assessment, interventions and recommendations. Outcome: Progressing  Note: Limitation: Decreased ADL status, Decreased Safe judgement during ADL, Decreased endurance, Decreased self-care trans, Decreased high-level ADLs, Decreased fine motor control, Decreased UE strength (impaired pain, balance, fxnl mobility, act malena, standing malena, strength, safety awareness, insight, sequencing)  Prognosis: Good  Assessment: Pt seen on this date for skilled OT treatment session. At start of session pt sitting in recliner chair requesting to use bathroom. Pt noted with new WBS as per ortho. Pt provided with RW with platform attachment, provided with education. Pt noted with improved functional transfers, and demonstrating improved functional mobility with use of RW, noted improved ability to provide support with elbow. Pt able to complete clothing management standing at toilet with CGA, completing hygiene with min A for thoroughness. Pt with improved overall standing tolerance at sink, demonstrating no LOB. Pt with improved activity tolerance from previous session, continues to be limited by O2 de-saturations and elevated HR. Pt with improved LB dressing tasks, and provided with edu on techniques to reduce pain. Pt with fair carry-through, may benefit from edu on [facility]. Pt would continue to benefit from skilled OT treatment sessions in order to address remaining deficits and continue to recommend d/c to rehab when medically stable.      OT Discharge Recommendation: Post acute rehabilitation services

## 2023-07-18 NOTE — ASSESSMENT & PLAN NOTE
- Nondisplaced greater trochanteric fracture, present on admission.  - Status post ground level mechanical fall on 7/13. - Appreciate Orthopedic surgery evaluation, recommendations and interventions as noted. - Status post insertion IM nail left femur 7/15.   - WBAT to left lower extremity.  - Monitor left lower extremity neurovascular exam.  - Continue multimodal analgesic regimen.  - Continue DVT prophylaxis. - PT and OT evaluation and treatment as indicated. - Outpatient follow up with Orthopedic surgery in 2 weeks with Dr. Malgorzata Álvarez for re-evaluation.

## 2023-07-18 NOTE — PROGRESS NOTES
Progress Note - Orthopedics   Leonidas Lincoln 74 y.o. female MRN: [id number]  Unit/Bed#: W -01      Subjective:    74 y. o.female patient seen and evaluated at bedside this morning. She reports no pain and left rest of the time. Reports are left of the sore but pain is well controlled. She reports and episode while sitting on the toilet of left leg feeling numb but currently she has normal sensation in left lower extremity. Has not yet received the brace for the left foot.     Labs:  0   Lab Value Date/Time    HCT 41.2 07/16/2023 0445    HCT 44.9 07/15/2023 0447    HCT 44.1 07/14/2023 2041    HGB 13.3 07/16/2023 0445    HGB 14.8 07/15/2023 0447    HGB 14.7 07/14/2023 2041    INR 1.04 07/14/2023 2041    WBC 7.09 07/16/2023 0445    WBC 7.48 07/15/2023 0447    WBC 7.87 07/14/2023 2041    ESR 16 09/27/2018 1031       Meds:    Current Facility-Administered Medications:   •  acetaminophen (TYLENOL) tablet 975 mg, 975 mg, Oral, Q6H PRN, Lorri Hernandez PA-C  •  budesonide (PULMICORT) inhalation solution 0.5 mg, 0.5 mg, Nebulization, BID, Lakia Aguayo DO, 0.5 mg at 07/18/23 4261  •  ceFAZolin (ANCEF) IVPB (premix in dextrose) 2,000 mg 50 mL, 2,000 mg, Intravenous, Q8H, Lorri Hernandez PA-C, Last Rate: 100 mL/hr at 07/18/23 0636, 2,000 mg at 07/18/23 0636  •  enoxaparin (LOVENOX) subcutaneous injection 30 mg, 30 mg, Subcutaneous, Q12H [address], Lorri Hernandez PA-C, 30 mg at 07/17/23 2140  •  ipratropium (ATROVENT) 0.02 % inhalation solution 0.5 mg, 0.5 mg, Nebulization, TID, Florida Licona MD, 0.5 mg at 07/18/23 0708  •  levalbuterol (Trenna Damme) inhalation solution 1.25 mg, 1.25 mg, Nebulization, TID, Florida Licona MD, 1.25 mg at 07/18/23 0708  •  methocarbamol (ROBAXIN) tablet 500 mg, 500 mg, Oral, Q6H PRN, Lorri Hernandez PA-C, 500 mg at 07/16/23 1240  •  nicotine (NICODERM CQ) 7 mg/24hr TD 24 hr patch 7 mg, 7 mg, Transdermal, Daily, Lorri Lee PA-C  • ondansetron (ZOFRAN) injection 4 mg, 4 mg, Intravenous, Q4H PRN, Lorri Hernandez PA-C  •  oxyCODONE (ROXICODONE) IR tablet 5 mg, 5 mg, Oral, Q4H PRN, Lorri Hernandez PA-C, 5 mg at 07/16/23 2335  •  oxyCODONE (ROXICODONE) split tablet 2.5 mg, 2.5 mg, Oral, Q4H PRN, Lorri Hernandez PA-C  •  senna (SENOKOT) tablet 17.2 mg, 2 tablet, Oral, Daily, Lorri Hernandez PA-C, 17.2 mg at 07/17/23 9994    Blood Culture:   No results found for: "BLOODCX"    Wound Culture:   No results found for: "WOUNDCULT"    Ins and Outs:  I/O last 24 hours: In: 290 [P.O.:240; IV Piggyback:50]  Out: -           Physical:  Vitals:    07/18/23 0724   BP: 114/87   Pulse: 103   Resp:    Temp: 97.7 °F (36.5 °C)   SpO2: 99%     Musculoskeletal: left Lower Extremity  · Surgical dressings c/d/i without significant strike through  · No, palable subcutaneous collection in the lateral thigh  · Sensation intact to saphenous, sural, tibial, superficial peroneal nerve, and deep peroneal  · Motor intact to +FHL/EHL, +ankle plantar flexion, she has +foot drop   · 2+ DP pulse, symmetric bilaterally  · Digits warm and well perfused  · Capillary refill < 2 seconds    Upper extremity:  Spica brace is intact  Sensation and motor intact in median radial ulnar nerve distributions  Capillary refill brisk in all digits    Assessment:    74 y. o.female s/p insertion intramedullary nail left femur with Dr. Scarlett Willingham 7/15/2023. Patient doing well. Suspected occult left radial styloid fracture    Plan:  · WBAT to the left lower extremity. · Nonweightbearing left upper extremity in thumb spica brace  · Given patients ongoing difficulty with foot drop/ankle dorsiflexion weakness, suggest multipodus boot/bracing - patient may benefit to avoid chronic flexion contracture. Defer to neurosurgery/primary team. No brace yet as of 7/18. · Hgb 12.3.  Will monitor for ABLA and administer IVF/prbc as indicated for Greater than 2 gram drop or Hgb < 7  · PT/OT  · Pain control per primary team.   · DVT ppx: lovenox while admitted, suggest aspirin 325mg BID upon discharge.    · Medical management per primary team.   · Dispo: Ortho will follow   · Patient will need follow up with Dr. Tamera Sheldon upon discharge.,  We will plan on x-rays of the left hip and left wrist at first postoperative visit 2 weeks from discharge    Sera Cohen PA-C

## 2023-07-18 NOTE — PHYSICAL THERAPY NOTE
PHYSICAL THERAPY NOTE          Patient Name: Norbert Vaughan  [employer] Date: 7/18/2023 07/18/23 0850   PT Last Visit   PT Visit Date 07/18/23   Note Type   Note Type Treatment   Pain Assessment   Pain Assessment Tool 0-10   Pain Score No Pain   Pain Location/Orientation Orientation: Left; Location: Leg   Pain Onset/Description Descriptor: Sore   Effect of Pain on Daily Activities limited activity tolerance, functional mobility   Hospital Pain Intervention(s) Repositioned; Ambulation/increased activity; Emotional support; Rest   Multiple Pain Sites No   Pain Rating: FLACC (Rest) - Face 0   Pain Rating: FLACC (Rest) - Legs 0   Pain Rating: FLACC (Rest) - Activity 0   Pain Rating: FLACC (Rest) - Cry 0   Pain Rating: FLACC (Rest) - Consolability 0   Score: FLACC (Rest) 0   Restrictions/Precautions   Weight Bearing Precautions Per Order Yes   LLE Weight Bearing Per Order WBAT   Braces or Orthoses   (L wrist brace)   Other Precautions WBS; Chair Alarm; Bed Alarm;O2;Fall Risk;Pain  (4 L NC 02)   General   Chart Reviewed Yes   Additional Pertinent History (S)  pt required several therapeutic seated rest breaks in todays tx session due to decreased Sp02 and increased HR with activity   Response to Previous Treatment Patient with no complaints from previous session. Family/Caregiver Present No   Cognition   Overall Cognitive Status WFL   Arousal/Participation Alert; Responsive; Cooperative   Attention Attends with cues to redirect   Orientation Level Oriented X4   Memory Within functional limits   Following Commands Follows one step commands without difficulty   Comments pt was pleasant and cooperatyiev throughout tx session   Subjective   Subjective pt was agreeable to participate in PT intervention   Bed Mobility   Rolling R Unable to assess   Rolling L Unable to assess   Supine to Sit Unable to assess   Sit to Supine Unable to assess Additional Comments pt seated OOB in the recliner post tx session   Transfers   Sit to Stand 4  Minimal assistance   Additional items Assist x 1; Armrests; Increased time required;Verbal cues   Stand to Sit 4  Minimal assistance   Additional items Assist x 1; Armrests; Increased time required;Verbal cues   Stand pivot 4  Minimal assistance   Additional items Assist x 1; Armrests; Increased time required;Verbal cues   Additional Comments pt required RW for all functional transfers in todays tx session with min Ax1 for safety and balance with VC's for hand placement while ascending to RW and descending back into recliner   Ambulation/Elevation   Gait pattern Improper Weight shift;Decreased foot clearance; Inconsistent jh; Short stride; Step to;Excessively slow;Decreased hip extension;Decreased toe off;Decreased heel strike   Gait Assistance 4  Minimal assist   Additional items Assist x 1;Verbal cues   Assistive Device Rolling walker   Distance 30'x1 RW   Stair Management Assistance 4  Minimal assist   Additional items Assist x 1;Verbal cues; Increased time required   Stair Management Technique Two rails; Step to pattern; Foreward;Backward   Number of Stairs 5   Ambulation/Elevation Additional Comments pt required VC's for foot placement while ascending steps and descending steps   Balance   Static Sitting Fair +   Dynamic Sitting Fair   Static Standing Fair -   Dynamic Standing Poor +   Ambulatory Poor +  (w/ RW)   Endurance Deficit   Endurance Deficit Yes   Endurance Deficit Description limited functional mobility, activity tolerance and ambulation distance   Activity Tolerance   Activity Tolerance Patient limited by fatigue; Other (Comment)  (decreased Sp02 and increased HR)   Nurse Made Aware Spoke to RN   Exercises   Hip Abduction Sitting;10 reps;AROM; Bilateral   Hip Adduction Sitting;10 reps;AROM; Bilateral  (pillow squeezes)   Knee AROM Long Arc Quad Sitting;10 reps;AROM; Bilateral   Marching Sitting;10 reps;AROM; Bilateral   Assessment   Prognosis Good   Problem List Decreased strength;Decreased endurance; Impaired balance;Decreased mobility; Impaired judgement;Decreased safety awareness;Decreased skin integrity;Orthopedic restrictions;Pain   Assessment pt began tx session sesssion seated OOB in the recliner and was agreeable to participate in PT intervention. pt continues to remain consistant with requiring min Ax1 for all functional transfers to and from RW. pt continues to require VC's for hand placement while ascending to RW and descending to recliner/toilet. pt was able to participate in TE activities while seated in recliner w/o increases in pain. pt was limited with functional mobility, activity tolerance and ambulation distance due to increase  with ambulation and 141 w/ stair trials, Sp02 decreased w/ ambulation to 84% and 86% with stair trials. pt required several therapeutic seated rest breaks in todays tx session of 4-5 minutes per rest. pt required min ax1 for ambulation with RW and min Ax1 for stair trials as pt was limited to 5 steps with bilateral hand rail use. Continue to recommend post acute rehab services at [facility] tiem of D/C in order to maximize pt functional independence and safety with all OOB mobility. POst tx pt in recliner with call bell and all pt needs met   Barriers to Discharge Inaccessible home environment   Goals   Patient Goals to have doctor fill out workers compensation parpers   STG Expiration Date 07/26/23   PT Treatment Day 1   Plan   Treatment/Interventions Functional transfer training;LE strengthening/ROM; Elevations; Therapeutic exercise; Endurance training;Patient/family training;Equipment eval/education; Bed mobility;Gait training;Spoke to nursing   Progress Slow progress, decreased activity tolerance   PT Frequency 4-6x/wk   Recommendation   PT Discharge Recommendation Post acute rehabilitation services   Equipment Recommended [address] walker   AM-PAC Basic Mobility Inpatient   Turning in Flat Bed Without Bedrails 3   Lying on Back to Sitting on Edge of Flat Bed Without Bedrails 2   Moving Bed to Chair 3   Standing Up From Chair Using Arms 3   Walk in Room 3   Climb 3-5 Stairs With Railing 3   Basic Mobility Inpatient Raw Score 17   Basic Mobility Standardized Score 39.67   Highest Level Of Mobility   -HLM Goal 5: Stand one or more mins   JH-HLM Achieved 7: Walk 25 feet or more   Education   Education Provided Mobility training;Assistive device   Patient Demonstrates acceptance/verbal understanding   End of Consult   Patient Position at End of Consult Bedside chair;Bed/Chair alarm activated; All needs within reach   The patient's AM-PAC Basic Mobility Inpatient Short Form Raw Score is 17. A Raw score of greater than 16 suggests the patient may benefit from discharge to home. Please also refer to the recommendation of the Physical Therapist for safe discharge planning. Alleen Lombard     Pt continues to be limited with functional mobility, activity tolerance and ambulation distance.  Pt would benefit from continued skilled PT intervention in order to address these deficits      Alleen Lombard

## 2023-07-18 NOTE — PLAN OF CARE
Problem: MOBILITY - ADULT  Goal: Maintain or return to baseline ADL function  Description: INTERVENTIONS:  -  Assess patient's ability to carry out ADLs; assess patient's baseline for ADL function and identify physical deficits which impact ability to perform ADLs (bathing, care of mouth/teeth, toileting, grooming, dressing, etc.)  - Assess/evaluate cause of self-care deficits   - Assess range of motion  - Assess patient's mobility; develop plan if impaired  - Assess patient's need for assistive devices and provide as appropriate  - Encourage maximum independence but intervene and supervise when necessary  - Involve family in performance of ADLs  - Assess for home care needs following discharge   - Consider OT consult to assist with ADL evaluation and planning for discharge  - Provide patient education as appropriate  7/18/2023 0151 by Tracie Treadwell RN  Outcome: Progressing  7/18/2023 0144 by Tracie Treadwell RN  Outcome: Progressing  Goal: Maintains/Returns to pre admission functional level  Description: INTERVENTIONS:  - Perform BMAT or MOVE assessment daily.   - Set and communicate daily mobility goal to care team and patient/family/caregiver. - Collaborate with rehabilitation services on mobility goals if consulted  - Perform Range of Motion  times a day. - Reposition patient every  hours.   - Dangle patient  times a day  - Stand patient  times a day  - Ambulate patient  times a day  - Out of bed to chair  times a day   - Out of bed for meals times a day  - Out of bed for toileting  - Record patient progress and toleration of activity level   7/18/2023 0151 by Tracie Treadwell RN  Outcome: Progressing  7/18/2023 0144 by Tracie Treadwell RN  Outcome: Progressing     Problem: PAIN - ADULT  Goal: Verbalizes/displays adequate comfort level or baseline comfort level  Description: Interventions:  - Encourage patient to monitor pain and request assistance  - Assess pain using appropriate pain scale  - Administer analgesics based on type and severity of pain and evaluate response  - Implement non-pharmacological measures as appropriate and evaluate response  - Consider cultural and social influences on pain and pain management  - Notify physician/advanced practitioner if interventions unsuccessful or patient reports new pain  7/18/2023 0151 by America Aguilar RN  Outcome: Progressing  7/18/2023 0144 by America Aguilar RN  Outcome: Progressing     Problem: INFECTION - ADULT  Goal: Absence or prevention of progression during hospitalization  Description: INTERVENTIONS:  - Assess and monitor for signs and symptoms of infection  - Monitor lab/diagnostic results  - Monitor all insertion sites, i.e. indwelling lines, tubes, and drains  - Monitor endotracheal if appropriate and nasal secretions for changes in amount and color  - [place] appropriate cooling/warming therapies per order  - Administer medications as ordered  - Instruct and encourage patient and family to use good hand hygiene technique  - Identify and instruct in appropriate isolation precautions for identified infection/condition  7/18/2023 0151 by America Aguilar RN  Outcome: Progressing  7/18/2023 0144 by America Aguilar RN  Outcome: Progressing  Goal: Absence of fever/infection during neutropenic period  Description: INTERVENTIONS:  - Monitor WBC    7/18/2023 0151 by America Aguilar RN  Outcome: Progressing  7/18/2023 0144 by America Aguilar RN  Outcome: Progressing     Problem: SAFETY ADULT  Goal: Maintain or return to baseline ADL function  Description: INTERVENTIONS:  -  Assess patient's ability to carry out ADLs; assess patient's baseline for ADL function and identify physical deficits which impact ability to perform ADLs (bathing, care of mouth/teeth, toileting, grooming, dressing, etc.)  - Assess/evaluate cause of self-care deficits   - Assess range of motion  - Assess patient's mobility; develop plan if impaired  - Assess patient's need for assistive devices and provide as appropriate  - Encourage maximum independence but intervene and supervise when necessary  - Involve family in performance of ADLs  - Assess for home care needs following discharge   - Consider OT consult to assist with ADL evaluation and planning for discharge  - Provide patient education as appropriate  7/18/2023 0151 by Wilfredo Severin, RN  Outcome: Progressing  7/18/2023 0144 by Wilfredo Severin, RN  Outcome: Progressing  Goal: Maintains/Returns to pre admission functional level  Description: INTERVENTIONS:  - Perform BMAT or MOVE assessment daily.   - Set and communicate daily mobility goal to care team and patient/family/caregiver. - Collaborate with rehabilitation services on mobility goals if consulted  - Perform Range of Motion  times a day. - Reposition patient every  hours.   - Dangle patient  times a day  - Stand patient  times a day  - Ambulate patient  times a day  - Out of bed to chair  times a day   - Out of bed for meals  times a day  - Out of bed for toileting  - Record patient progress and toleration of activity level   7/18/2023 0151 by Wilfredo Severin, RN  Outcome: Progressing  7/18/2023 0144 by Wilfredo Severin, RN  Outcome: Progressing  Goal: Patient will remain free of falls  Description: INTERVENTIONS:  - Educate patient/family on patient safety including physical limitations  - Instruct patient to call for assistance with activity   - Consult OT/PT to assist with strengthening/mobility   - Keep Call bell within reach  - Keep bed low and locked with side rails adjusted as appropriate  - Keep care items and personal belongings within reach  - Initiate and maintain comfort rounds  - Make Fall Risk Sign visible to staff  - Offer Toileting every  Hours, in advance of need  - Initiate/Maintain alarm  - Obtain necessary fall risk management equipment:   - Apply yellow socks and bracelet for high fall risk patients  - Consider moving patient to room near nurses station  7/18/2023 0151 by Wilfredo Severin, RN  Outcome: Progressing  7/18/2023 0144 by Dangelo Fairchild RN  Outcome: Progressing     Problem: DISCHARGE PLANNING  Goal: Discharge to home or other facility with appropriate resources  Description: INTERVENTIONS:  - Identify barriers to discharge w/patient and caregiver  - Arrange for needed discharge resources and transportation as appropriate  - Identify discharge learning needs (meds, wound care, etc.)  - Arrange for interpretive services to assist at discharge as needed  - Refer to Case Management Department for coordinating discharge planning if the patient needs post-hospital services based on physician/advanced practitioner order or complex needs related to functional status, cognitive ability, or social support system  7/18/2023 0151 by Dangelo Fairchild RN  Outcome: Progressing  7/18/2023 0144 by Dangelo Fairchild RN  Outcome: Progressing     Problem: Knowledge Deficit  Goal: Patient/family/caregiver demonstrates understanding of disease process, treatment plan, medications, and discharge instructions  Description: Complete learning assessment and assess knowledge base.   Interventions:  - Provide teaching at level of understanding  - Provide teaching via preferred learning methods  7/18/2023 0151 by Dangelo Fairchild RN  Outcome: Progressing  7/18/2023 0144 by Dangelo Fairchild RN  Outcome: Progressing

## 2023-07-18 NOTE — ASSESSMENT & PLAN NOTE
- Continues to have episodes of hyperglycemia to 218, 241, 239 throughout the day   - AM glucose checks have been normal   - Will start patient on insulin sliding scale   - No known history of diabetes

## 2023-07-18 NOTE — PLAN OF CARE
Problem: MOBILITY - ADULT  Goal: Maintain or return to baseline ADL function  Description: INTERVENTIONS:  -  Assess patient's ability to carry out ADLs; assess patient's baseline for ADL function and identify physical deficits which impact ability to perform ADLs (bathing, care of mouth/teeth, toileting, grooming, dressing, etc.)  - Assess/evaluate cause of self-care deficits   - Assess range of motion  - Assess patient's mobility; develop plan if impaired  - Assess patient's need for assistive devices and provide as appropriate  - Encourage maximum independence but intervene and supervise when necessary  - Involve family in performance of ADLs  - Assess for home care needs following discharge   - Consider OT consult to assist with ADL evaluation and planning for discharge  - Provide patient education as appropriate  Outcome: Progressing  Goal: Maintains/Returns to pre admission functional level  Description: INTERVENTIONS:  - Perform BMAT or MOVE assessment daily.   - Set and communicate daily mobility goal to care team and patient/family/caregiver. - Collaborate with rehabilitation services on mobility goals if consulted  - Perform Range of Motion  times a day. - Reposition patient every  hours.   - Dangle patient  times a day  - Stand patient  times a day  - Ambulate patient  times a day  - Out of bed to chair  times a day   - Out of bed for meals  times a day  - Out of bed for toileting  - Record patient progress and toleration of activity level   Outcome: Progressing     Problem: PAIN - ADULT  Goal: Verbalizes/displays adequate comfort level or baseline comfort level  Description: Interventions:  - Encourage patient to monitor pain and request assistance  - Assess pain using appropriate pain scale  - Administer analgesics based on type and severity of pain and evaluate response  - Implement non-pharmacological measures as appropriate and evaluate response  - Consider cultural and social influences on pain and pain management  - Notify physician/advanced practitioner if interventions unsuccessful or patient reports new pain  Outcome: Progressing     Problem: INFECTION - ADULT  Goal: Absence or prevention of progression during hospitalization  Description: INTERVENTIONS:  - Assess and monitor for signs and symptoms of infection  - Monitor lab/diagnostic results  - Monitor all insertion sites, i.e. indwelling lines, tubes, and drains  - Monitor endotracheal if appropriate and nasal secretions for changes in amount and color  - [place] appropriate cooling/warming therapies per order  - Administer medications as ordered  - Instruct and encourage patient and family to use good hand hygiene technique  - Identify and instruct in appropriate isolation precautions for identified infection/condition  Outcome: Progressing  Goal: Absence of fever/infection during neutropenic period  Description: INTERVENTIONS:  - Monitor WBC    Outcome: Progressing     Problem: SAFETY ADULT  Goal: Maintain or return to baseline ADL function  Description: INTERVENTIONS:  -  Assess patient's ability to carry out ADLs; assess patient's baseline for ADL function and identify physical deficits which impact ability to perform ADLs (bathing, care of mouth/teeth, toileting, grooming, dressing, etc.)  - Assess/evaluate cause of self-care deficits   - Assess range of motion  - Assess patient's mobility; develop plan if impaired  - Assess patient's need for assistive devices and provide as appropriate  - Encourage maximum independence but intervene and supervise when necessary  - Involve family in performance of ADLs  - Assess for home care needs following discharge   - Consider OT consult to assist with ADL evaluation and planning for discharge  - Provide patient education as appropriate  Outcome: Progressing  Goal: Maintains/Returns to pre admission functional level  Description: INTERVENTIONS:  - Perform BMAT or MOVE assessment daily.   - Set and communicate daily mobility goal to care team and patient/family/caregiver. - Collaborate with rehabilitation services on mobility goals if consulted  - Perform Range of Motion  times a day. - Reposition patient every  hours.   - Dangle patient  times a day  - Stand patient  times a day  - Ambulate patient  times a day  - Out of bed to chair  times a day   - Out of bed for meals  times a day  - Out of bed for toileting  - Record patient progress and toleration of activity level   Outcome: Progressing  Goal: Patient will remain free of falls  Description: INTERVENTIONS:  - Educate patient/family on patient safety including physical limitations  - Instruct patient to call for assistance with activity   - Consult OT/PT to assist with strengthening/mobility   - Keep Call bell within reach  - Keep bed low and locked with side rails adjusted as appropriate  - Keep care items and personal belongings within reach  - Initiate and maintain comfort rounds  - Make Fall Risk Sign visible to staff  - Offer Toileting every Hours, in advance of need  - Initiate/Maintain alarm  - Obtain necessary fall risk management equipment:   - Apply yellow socks and bracelet for high fall risk patients  - Consider moving patient to room near nurses station  Outcome: Progressing     Problem: DISCHARGE PLANNING  Goal: Discharge to home or other facility with appropriate resources  Description: INTERVENTIONS:  - Identify barriers to discharge w/patient and caregiver  - Arrange for needed discharge resources and transportation as appropriate  - Identify discharge learning needs (meds, wound care, etc.)  - Arrange for interpretive services to assist at discharge as needed  - Refer to Case Management Department for coordinating discharge planning if the patient needs post-hospital services based on physician/advanced practitioner order or complex needs related to functional status, cognitive ability, or social support system  Outcome: Progressing Problem: Knowledge Deficit  Goal: Patient/family/caregiver demonstrates understanding of disease process, treatment plan, medications, and discharge instructions  Description: Complete learning assessment and assess knowledge base.   Interventions:  - Provide teaching at level of understanding  - Provide teaching via preferred learning methods  Outcome: Progressing

## 2023-07-18 NOTE — PROGRESS NOTES
[facility] [employer]  Progress Note  Name: Raul Kuo  MRN: [id number]  Unit/Bed#: W -01 I Date of Admission: 7/14/2023   Date of Service: 7/18/2023 I Hospital Day: 4    Assessment/Plan   Fall  Assessment & Plan  - Status post fall with the below noted injuries. - Fall precautions. - Geriatric Medicine consultation for evaluation, medication review and recommendations.  - PT and OT evaluation and treatment as indicated. - Case Management consultation for disposition planning. Closed nondisplaced fracture of greater trochanter of left femur (HCC)  Assessment & Plan  - Nondisplaced greater trochanteric fracture, present on admission.  - Status post ground level mechanical fall on 7/13. - Appreciate Orthopedic surgery evaluation, recommendations and interventions as noted. - Status post insertion IM nail left femur 7/15.   - WBAT to left lower extremity.  - Monitor left lower extremity neurovascular exam.  - Continue multimodal analgesic regimen.  - Continue DVT prophylaxis. - PT and OT evaluation and treatment as indicated. - Outpatient follow up with Orthopedic surgery in 2 weeks with Dr. Cady Walker for re-evaluation. Weakness of left lower extremity  Assessment & Plan  - Reports left lower extremity weakness, ongoing x 1 year. - 2/5 strength with dorsiflexion and plantarflexion, no movement against gravity, in left lower extremity.  - No numbness, no saddle anesthesia, no bowel or bladder incontinence, normal rectal tone, post-void residual 0.   - 7/14 CT lumber spine:  No acute osseous abnormality. Degenerative change without high-grade canal or foraminal stenosis, most pronounced at L3-4 with mild canal and foraminal narrowing.    - 7/14 MRI lumbar spine: Minor noncompressive lumbar degenerative change. No cauda equina or foraminal nerve impingement.   - Consulted neurosurgery, appreciate recommendations.    - No acute neurosurgical intervention indicated at this time   - Inability to dorsiflex with a foot drop is unexplained by the CT and MRI completed this admission.   - No other focal neurological deficits at this time     - Can consider outpatient EMG to determine if a peripheral nerve issue.  - Follow up in 6 weeks with neuromuscular attending/AP. She will require a EMG/NCS within 6 weeks. Lower extremity pain, left  Assessment & Plan  - Reporting ongoing pain in her left lower extremity, most localized to the left knee   - Left femoral greater trochanteric fracture s/p ORIF   - Knee XR:   - LLE duplex:   - Ortho is following, appreciate recommendations   • WBAT to the left lower extremity. • Given patients ongoing difficulty with foot drop/ankle dorsiflexion weakness, suggest multipodus boot/bracing - patient may benefit to avoid chronic flexion contracture. COPD (chronic obstructive pulmonary disease) ([facility])  Assessment & Plan  - Evidence of COPD on CT chest in 2018   - No history of PFTs performed   - No home oxygen requirement   - Uses Primatene mist at home   - Pulmonary following, appreciate recommendations   - Continue scheduled nebulized regimen of Atrovent/Xopenex/Budesonide    - Patient is currently requiring 4 L NC to maintain saturations 88-90%   - Unable to wean oxygen at this time   - Continue to hold off on oral steroids for now   - Encourage incentive spirometer use             Bowel Regimen: Senna   VTE Prophylaxis:Enoxaparin (Lovenox)     Disposition: Pending placement to rehab     Subjective   Chief Complaint: Resting comfortably in chair. Tolerating PO. Had a bowel movement earlier today. No acute concerns. Objective   Vitals:   Temp:  [97.5 °F (36.4 °C)-98.8 °F (37.1 °C)] 97.5 °F (36.4 °C)  HR:  [103-118] 118  Resp:  [17-97] 17  BP: (114-132)/(80-91) 132/80    I/O       07/16 0701  07/17 0700 07/17 0701  07/18 0700 07/18 0701  07/19 0700    P. O.  240 240    I.V. (mL/kg)       IV Piggyback 118.3 50     Total Intake(mL/kg) 118.3 (2.5) 290 (6) 240 (5)    Urine (mL/kg/hr)   300 (0.7)    Total Output   300    Net +118.3 +290 -60                  Physical Exam:     GENERAL APPEARANCE: NAD, resting comfortably in chair    NEURO: GCS 15, no focal neurological deficits, moving all extremities    HEENT: NCAT   CV:tachycardic   LUNGS: coarse breath sounds, NC in place    GI: soft, non-tender   : voiding   MSK: LLE weakness with dorsiflexion consistent with foot drop, 5/5 strength with plantarflexion, neurovascularly intact with 2+ DP pulses; LLE is soft, compressible, no pain with passive stretch; no concerning findings to represent compartment syndrome    SKIN: dressing to left proximal thigh is clean dry intact       Invasive Devices     Peripheral Intravenous Line  Duration           Peripheral IV 07/14/23 Right Forearm 3 days                      Lab Results: Results: I have personally reviewed all pertinent laboratory/tests results  Imaging: I have personally reviewed pertinent reports.      Other Studies:

## 2023-07-18 NOTE — OCCUPATIONAL THERAPY NOTE
Occupational Therapy Progress Note     Patient Name: Maribell MALDONADO Date: 7/18/2023  Problem List  Active Problems:    Fall    Closed nondisplaced fracture of greater trochanter of left femur (HCC)    Weakness of left lower extremity    COPD (chronic obstructive pulmonary disease) (HCC)    Lower extremity pain, left            07/18/23 1425   OT Last Visit   OT Visit Date 07/18/23   Note Type   Note Type Treatment   Pain Assessment   Pain Assessment Tool 0-10   Pain Score 3   Pain Location/Orientation Orientation: Left; Location: Leg   Restrictions/Precautions   Weight Bearing Precautions Per Order Yes   LUE Weight Bearing Per Order (S)  NWB  (as of 7/18 pt is wrist and hand; okay to WB through elbow on platform walker)   LLE Weight Bearing Per Order WBAT   Braces or Orthoses   (L wrist brace)   Other Precautions WBS; Fall Risk;Pain  (4L O2)   ADL   Grooming Assistance 5  Supervision/Setup   Grooming Deficit Increased time to complete;Supervision/safety;Verbal cueing;Standing with assistive device   Grooming Comments standing at sink to complete   LB Bathing Comments offering pt opportunity to complete shower, pt declining   LB Dressing Assistance 3  Moderate Assistance   LB Dressing Deficit Increased time to complete;Supervision/safety;Verbal cueing; Don/doff L sock   LB Dressing Comments Able to don R sock, A with L due to pain with elevating LLE and limited forward functional reach   [address]  4  Minimal Assistance   Toileting Deficit Increased time to complete;Supervison/safety;Verbal cueing;Clothing management down;Perineal hygiene;Clothing management up   Toileting Comments A for thoroughness with perihygiene. Able to complete underwear in standing with overall CGA   Functional Standing Tolerance   Time 2 min   Activity grooming tasks standing at sink   Comments no LOB noted   Transfers   Sit to Stand 4  Minimal assistance   Additional items Assist x 1; Increased time required;Verbal cues Stand to Sit 4  Minimal assistance   Additional items Assist x 1; Increased time required;Verbal cues   Toilet transfer 4  Minimal assistance   Additional items Assist x 1; Increased time required;Verbal cues; Commode  (commode over toilet)   Additional Comments Pt requiring edu on safe hand placement with use of platform attachment on RW   Functional Mobility   Functional Mobility 4  Minimal assistance   Additional Comments Ax1, recliner >< bathroom. Pt requiring extensive seated rest break due to SOB, O2 sats dropping to 85% with activity   Additional items Rolling walker   Subjective   Subjective "I don't really notice a difference with this (platform walker)"   Cognition   Overall Cognitive Status WFL   Arousal/Participation Alert; Cooperative   Attention Attends with cues to redirect   Orientation Level Oriented X4   Memory Within functional limits   Following Commands Follows one step commands without difficulty   Comments pleasant and cooperative   Activity Tolerance   Activity Tolerance Patient limited by fatigue   Medical Staff Made Aware TED [place]   Assessment   Assessment Pt seen on this date for skilled OT treatment session. At start of session pt sitting in recliner chair requesting to use bathroom. Pt noted with new WBS as per ortho. Pt provided with RW with platform attachment, provided with education. Pt noted with improved functional transfers, and demonstrating improved functional mobility with use of RW, noted improved ability to provide support with elbow. Pt able to complete clothing management standing at toilet with CGA, completing hygiene with min A for thoroughness. Pt with improved overall standing tolerance at sink, demonstrating no LOB. Pt with improved activity tolerance from previous session, continues to be limited by O2 de-saturations and elevated HR. Pt with improved LB dressing tasks, and provided with edu on techniques to reduce pain.  Pt with fair carry-through, may benefit from edu on [address] AE. Pt would continue to benefit from skilled OT treatment sessions in order to address remaining deficits and continue to recommend d/c to rehab when medically stable. Plan   Goal Expiration Date 07/27/23   OT Treatment Day 1   OT Frequency 3-5x/wk   Recommendation   OT Discharge Recommendation Post acute rehabilitation services   AM-PAC Daily Activity Inpatient   Lower Body Dressing 2   Bathing 2   Toileting 3   Upper Body Dressing 3   Grooming 3   Eating 4   Daily Activity Raw Score 17   Daily Activity Standardized Score (Calc for Raw Score >=11) 37.26   AM-PAC Applied Cognition Inpatient   Following a Speech/Presentation 3   Understanding Ordinary Conversation 4   Taking Medications 3   Remembering Where Things Are Placed or Put Away 3   Remembering List of 4-5 Errands 2   Taking Care of Complicated Tasks 2   Applied Cognition Raw Score 17   Applied Cognition Standardized Score 36.52   End of Consult   Patient Position at End of Consult Bedside chair;Bed/Chair alarm activated; All needs within reach       GOALS:      -Patient will perform grooming tasks standing at sink with overall Mod I in order to increase overall independence PROGRESSING     -Patient will be Mod I with UB dressing using AE and AD as needed in order to increase (I) with ADLs     -Patient will be Mod I with UB bathing using AE and AD as needed in order to increase (I) with ADLs     -Patient will be Mod I with LB dressing with use of AE and AD as needed in order to increase (I) with ADLs PROGRESSING     -Patient will be Mod I with LB bathing with use of AE and AD as needed in order to increase (I) with ADLs     -Patient will complete toileting w/ Mod I w/ G hygiene/thoroughness in order to reduce caregiver burden PROGRESSING     -Patient will demonstrate Mod I with bed mobility for ability to manage own comfort and initiate OOB tasks.      -Patient will perform functional transfers with Mod I to/from all surfaces using DME as needed in order to increase (I) with functional tasks PROGRESSING     -Patient will be Mod I with functional mobility to/from bathroom for increased independence with toileting tasks PROGRESSING     -Patient will tolerate therapeutic activities for greater than 30 min, in order to increase tolerance for functional activities. PROGRESSING     -Patient will demonstrate standing for 3 min with Mod I in order to increase active participation in functional activities [address]     -Patient will demonstrate proper management of wrist brace with no VC in order to achieve protection and safety of joint        The patient's raw score on the -PAC Daily Activity Inpatient Short Form is 17. A raw score of less than 19 suggests the patient may benefit from discharge to post-acute rehabilitation services. Please refer to the recommendation of the Occupational Therapist for safe discharge planning.        Linda Walker MS, OTR/L

## 2023-07-18 NOTE — ASSESSMENT & PLAN NOTE
- Reporting ongoing pain in her left lower extremity, most localized to the left knee   - Left femoral greater trochanteric fracture s/p ORIF   - Knee XR:   - LLE duplex:   - Ortho is following, appreciate recommendations   • WBAT to the left lower extremity. • Given patients ongoing difficulty with foot drop/ankle dorsiflexion weakness, suggest multipodus boot/bracing - patient may benefit to avoid chronic flexion contracture.

## 2023-07-18 NOTE — CASE MANAGEMENT
Case Management Discharge Planning Note    Patient name Adilene Gaxiola  Location W /W -01 MRN [id number]  : 1955 Date 2023       Current Admission Date: 2023  Current Admission Diagnosis:Fall   Patient Active Problem List    Diagnosis Date Noted   • Lower extremity pain, left 2023   • Fall 2023   • Closed nondisplaced fracture of greater trochanter of left femur ([address]) 2023   • Weakness of left lower extremity 2023   • COPD (chronic obstructive pulmonary disease) ([address]) 2023      LOS (days): 4  Geometric Mean LOS (GMLOS) (days):   Days to GMLOS:     OBJECTIVE:  Risk of Unplanned Readmission Score: 7.25         Current admission status: Inpatient   Preferred Pharmacy:   [address] [place], [place] - [address] [place] [address]  Phone: [phone] Fax: [phone]    Primary Care Provider: Erlinda Olson MD    Primary Insurance: BLUE CROSS  Secondary Insurance:     DISCHARGE DETAILS:    Discharge planning discussed with[de-identified] pt and   Freedom of Choice: Yes  Comments - Freedom of Choice: CM met with pt to review the choices at this time for those facilities that are able to accept her. [facility] has accepted and [facility] is waitng to provide an answer until her respiratory status improves. At this time the Trauma Team reports pt is stable for DC. CM also reviewed with her the subacute facilities that are able to accept her. Both  and pt agree that she should go to acute rehab.  states he is willing to travel to see pt at [facility] as pt would like to go to [facility]. CM reviewed and discussed insurance coverage as well as the need for auth prior to DC. Slade Baeza has been initiated.   CM contacted family/caregiver?: Yes  Were Treatment Team discharge recommendations reviewed with patient/caregiver?: Yes  Did patient/caregiver verbalize understanding of patient care needs?: Yes  Were patient/caregiver advised of the risks associated with not following Treatment Team discharge recommendations?: Yes    Contacts  Patient Contacts:   Relationship to Patient[de-identified] Family  Contact Method:  In Person  Reason/Outcome: Continuity of Care, Emergency Contact, Discharge Planning       Treatment Team Recommendation: Acute Rehab  Discharge Destination Plan[de-identified] Acute Rehab

## 2023-07-18 NOTE — PLAN OF CARE
Problem: PHYSICAL THERAPY ADULT  Goal: Performs mobility at highest level of function for planned discharge setting. See evaluation for individualized goals. Description: Treatment/Interventions: LE strengthening/ROM, Functional transfer training, Elevations, Therapeutic exercise, Endurance training, Patient/family training, Equipment eval/education, Bed mobility, Gait training, Compensatory technique education  Equipment Recommended: Law Joiner       See flowsheet documentation for full assessment, interventions and recommendations. Outcome: Progressing  Note: Prognosis: Good  Problem List: Decreased strength, Decreased endurance, Impaired balance, Decreased mobility, Impaired judgement, Decreased safety awareness, Decreased skin integrity, Orthopedic restrictions, Pain  Assessment: pt began tx session sesssion seated OOB in the recliner and was agreeable to participate in PT intervention. pt continues to remain consistant with requiring min Ax1 for all functional transfers to and from RW. pt continues to require VC's for hand placement while ascending to RW and descending to recliner/toilet. pt was able to participate in TE activities while seated in recliner w/o increases in pain. pt was limited with functional mobility, activity tolerance and ambulation distance due to increase  with ambulation and 141 w/ stair trials, Sp02 decreased w/ ambulation to 84% and 86% with stair trials. pt required several therapeutic seated rest breaks in todays tx session of 4-5 minutes per rest. pt required min ax1 for ambulation with RW and min Ax1 for stair trials as pt was limited to 5 steps with bilateral hand rail use. [facility] in order to maximize pt functional independence and safety with all OOB mobility.  POst tx pt in recliner with call bell and all pt needs met  Barriers to Discharge: Inaccessible home environment     PT Discharge Recommendation: Post acute rehabilitation services    See flowsheet documentation for full assessment.

## 2023-07-19 LAB
GLUCOSE SERPL-MCNC: 118 MG/DL (ref 65–140)
GLUCOSE SERPL-MCNC: 125 MG/DL (ref 65–140)
GLUCOSE SERPL-MCNC: 135 MG/DL (ref 65–140)
GLUCOSE SERPL-MCNC: 155 MG/DL (ref 65–140)

## 2023-07-19 PROCEDURE — 94640 AIRWAY INHALATION TREATMENT: CPT

## 2023-07-19 PROCEDURE — 82948 REAGENT STRIP/BLOOD GLUCOSE: CPT

## 2023-07-19 PROCEDURE — 99024 POSTOP FOLLOW-UP VISIT: CPT | Performed by: PHYSICIAN ASSISTANT

## 2023-07-19 PROCEDURE — 94760 N-INVAS EAR/PLS OXIMETRY 1: CPT

## 2023-07-19 PROCEDURE — 99232 SBSQ HOSP IP/OBS MODERATE 35: CPT | Performed by: STUDENT IN AN ORGANIZED HEALTH CARE EDUCATION/TRAINING PROGRAM

## 2023-07-19 RX ADMIN — BUDESONIDE 0.5 MG: 0.5 INHALANT ORAL at 19:08

## 2023-07-19 RX ADMIN — IPRATROPIUM BROMIDE 0.5 MG: 0.5 SOLUTION RESPIRATORY (INHALATION) at 13:37

## 2023-07-19 RX ADMIN — INSULIN DETEMIR 18 UNITS: 100 INJECTION, SOLUTION SUBCUTANEOUS at 21:28

## 2023-07-19 RX ADMIN — ENOXAPARIN SODIUM 30 MG: 30 INJECTION SUBCUTANEOUS at 21:27

## 2023-07-19 RX ADMIN — LEVALBUTEROL HYDROCHLORIDE 1.25 MG: 1.25 SOLUTION RESPIRATORY (INHALATION) at 13:37

## 2023-07-19 RX ADMIN — IPRATROPIUM BROMIDE 0.5 MG: 0.5 SOLUTION RESPIRATORY (INHALATION) at 19:08

## 2023-07-19 RX ADMIN — LEVALBUTEROL HYDROCHLORIDE 1.25 MG: 1.25 SOLUTION RESPIRATORY (INHALATION) at 19:08

## 2023-07-19 RX ADMIN — IPRATROPIUM BROMIDE 0.5 MG: 0.5 SOLUTION RESPIRATORY (INHALATION) at 07:21

## 2023-07-19 RX ADMIN — LEVALBUTEROL HYDROCHLORIDE 1.25 MG: 1.25 SOLUTION RESPIRATORY (INHALATION) at 07:21

## 2023-07-19 RX ADMIN — CEFAZOLIN SODIUM 2000 MG: 2 SOLUTION INTRAVENOUS at 05:14

## 2023-07-19 RX ADMIN — CEFAZOLIN SODIUM 2000 MG: 2 SOLUTION INTRAVENOUS at 12:55

## 2023-07-19 RX ADMIN — BUDESONIDE 0.5 MG: 0.5 INHALANT ORAL at 07:21

## 2023-07-19 RX ADMIN — CEFAZOLIN SODIUM 2000 MG: 2 SOLUTION INTRAVENOUS at 21:27

## 2023-07-19 RX ADMIN — SENNOSIDES 17.2 MG: 8.6 TABLET, FILM COATED ORAL at 08:51

## 2023-07-19 NOTE — ASSESSMENT & PLAN NOTE
- Nondisplaced greater trochanteric fracture, present on admission.  - Status post ground level mechanical fall on 7/13. - Appreciate Orthopedic surgery evaluation, recommendations and interventions as noted. - Status post insertion IM nail left femur 7/15.   - WBAT to left lower extremity.  - Monitor left lower extremity neurovascular exam.  - Continue multimodal analgesic regimen.  - Continue DVT prophylaxis. - PT and OT evaluation and treatment as indicated. - Outpatient follow up with Orthopedic surgery in 2 weeks with Dr. Teetee Alex for re-evaluation.

## 2023-07-19 NOTE — ASSESSMENT & PLAN NOTE
- Reporting ongoing pain in her left lower extremity, most localized to the left knee   - Left femoral greater trochanteric fracture s/p ORIF   - Knee XR: negative   - LLE duplex: negative   - Ortho is following, appreciate recommendations   • WBAT to the left lower extremity. • Given patients ongoing difficulty with foot drop/ankle dorsiflexion weakness, suggest multipodus boot/bracing - patient may benefit to avoid chronic flexion contracture.

## 2023-07-19 NOTE — PROGRESS NOTES
Progress Note - Orthopedics   Naa Herrera 74 y.o. female MRN: [id number]  Unit/Bed#: W -01      Subjective:    74 y. o.female patient seen and evaluated at bedside. No significant complaints today. Feels her left hand pain has improved. No complaints of hip pain.      Labs:  0   Lab Value Date/Time    HCT 37.9 07/18/2023 0959    HCT 41.2 07/16/2023 0445    HCT 44.9 07/15/2023 0447    HGB 12.3 07/18/2023 0959    HGB 13.3 07/16/2023 0445    HGB 14.8 07/15/2023 0447    INR 1.04 07/14/2023 2041    WBC 5.16 07/18/2023 0959    WBC 7.09 07/16/2023 0445    WBC 7.48 07/15/2023 0447    ESR 16 09/27/2018 1031       Meds:    Current Facility-Administered Medications:   •  acetaminophen (TYLENOL) tablet 975 mg, 975 mg, Oral, Q6H PRN, Lorri Hernandez PA-C  •  budesonide (PULMICORT) inhalation solution 0.5 mg, 0.5 mg, Nebulization, BID, Tanja Metcalf DO, 0.5 mg at 07/19/23 3312  •  ceFAZolin (ANCEF) IVPB (premix in dextrose) 2,000 mg 50 mL, 2,000 mg, Intravenous, Q8H, Lorri Hernandez PA-C, Last Rate: 100 mL/hr at 07/19/23 0514, 2,000 mg at 07/19/23 0514  •  enoxaparin (LOVENOX) subcutaneous injection 30 mg, 30 mg, Subcutaneous, Q12H [facility], Lorri Hernandez PA-C, 30 mg at 07/18/23 2209  •  insulin detemir (LEVEMIR) subcutaneous injection 18 Units, 18 Units, Subcutaneous, HS, [facility] DO, 18 Units at 07/18/23 2211  •  insulin lispro (HumaLOG) 100 units/mL subcutaneous injection 1-6 Units, 1-6 Units, Subcutaneous, TID AC **AND** Fingerstick Glucose (POCT), , , 4x Daily AC and at bedtime, Yu Bermudez DO  •  ipratropium (ATROVENT) 0.02 % inhalation solution 0.5 mg, 0.5 mg, Nebulization, TID, Merlin Mae, MD, 0.5 mg at 07/19/23 4290  •  levalbuterol (XOPENEX) inhalation solution 1.25 mg, 1.25 mg, Nebulization, TID, Merlin Mae, MD, 1.25 mg at 07/19/23 9193  •  methocarbamol (ROBAXIN) tablet 500 mg, 500 mg, Oral, Q6H PRN, Lorri Hernandez PA-C, 500 mg at 07/16/23 1240  •  nicotine (NICODERM CQ) 7 mg/24hr TD 24 hr patch 7 mg, 7 mg, Transdermal, Daily, Lorri Hernandez PA-C  •  ondansetron (ZOFRAN) injection 4 mg, 4 mg, Intravenous, Q4H PRN, Lorri Hernandez PA-C  •  oxyCODONE (ROXICODONE) IR tablet 5 mg, 5 mg, Oral, Q4H PRN, Lorri Hernandez PA-C, 5 mg at 07/16/23 2335  •  oxyCODONE (ROXICODONE) split tablet 2.5 mg, 2.5 mg, Oral, Q4H PRN, Lorri Hernandez PA-C  •  senna (SENOKOT) tablet 17.2 mg, 2 tablet, Oral, Daily, Lorri Hernandez PA-C, 17.2 mg at 07/19/23 0851    Blood Culture:   No results found for: "BLOODCX"    Wound Culture:   No results found for: "WOUNDCULT"    Ins and Outs:  I/O last 24 hours: In: 480 [P.O.:480]  Out: 300 [Urine:300]          Physical:  Vitals:    07/19/23 0746   BP: 131/83   Pulse: (!) 111   Resp: 20   Temp: 97.9 °F (36.6 °C)   SpO2: 92%     Musculoskeletal: left Lower Extremity  · Surgical dressings c/d/i without significant strike through  · Moderate gagandeep incisional ttp. · Sensation intact to saphenous, sural, tibial, superficial peroneal nerve, and deep peroneal  · Motor intact to +FHL/EHL, +ankle plantar flexion, she has +foot drop   · 2+ DP pulse, symmetric bilaterally  · Digits warm and well perfused  · Capillary refill < 2 seconds    Assessment:    74 y. o.female s/p insertion intramedullary nail left femur with Dr. Bethanie Warren 7/15/2023. Patient doing well. Plan:  · WBAT to the left lower extremity. · Management of left foot drop per primary/neurosurgery. · Maintain cockup wrist splint for comfort at this time. Will plan for repeat XRAYs as outpatient. · Hgb 12.3. Will monitor for ABLA and administer IVF/prbc as indicated for Greater than 2 gram drop or Hgb < 7  · PT/OT  · Pain control per primary team.   · DVT ppx: lovenox while admitted, suggest aspirin 325mg BID upon discharge. · Medical management per primary team.   · Dispo: Ortho stable.    · Patient will need follow up with  [facility] upon discharge.      Hodan Schwartz PA-C

## 2023-07-19 NOTE — PROGRESS NOTES
[address] [facility]  Progress Note  Name: Erin Ruiz  MRN: [id number]  Unit/Bed#: W -01 I Date of Admission: 7/14/2023   Date of Service: 7/19/2023 I Hospital Day: 5    Assessment/Plan   Fall  Assessment & Plan  - Status post fall with the below noted injuries. - Fall precautions. - Geriatric Medicine consultation for evaluation, medication review and recommendations.  - PT and OT evaluation and treatment as indicated. - Case Management consultation for disposition planning. Closed nondisplaced fracture of greater trochanter of left femur (HCC)  Assessment & Plan  - Nondisplaced greater trochanteric fracture, present on admission.  - Status post ground level mechanical fall on 7/13. - Appreciate Orthopedic surgery evaluation, recommendations and interventions as noted. - Status post insertion IM nail left femur 7/15.   - WBAT to left lower extremity.  - Monitor left lower extremity neurovascular exam.  - Continue multimodal analgesic regimen.  - Continue DVT prophylaxis. - PT and OT evaluation and treatment as indicated. - Outpatient follow up with Orthopedic surgery in 2 weeks with Dr. Bethanie Warren for re-evaluation. Weakness of left lower extremity  Assessment & Plan  - Reports left lower extremity weakness, ongoing x 1 year. - 2/5 strength with dorsiflexion and plantarflexion, no movement against gravity, in left lower extremity.  - No numbness, no saddle anesthesia, no bowel or bladder incontinence, normal rectal tone, post-void residual 0.   - 7/14 CT lumber spine:  No acute osseous abnormality. Degenerative change without high-grade canal or foraminal stenosis, most pronounced at L3-4 with mild canal and foraminal narrowing.    - 7/14 MRI lumbar spine: Minor noncompressive lumbar degenerative change. No cauda equina or foraminal nerve impingement.   - Consulted neurosurgery, appreciate recommendations.    - No acute neurosurgical intervention indicated at this time   - Inability to dorsiflex with a foot drop is unexplained by the CT and MRI completed this admission.   - No other focal neurological deficits at this time     - Can consider outpatient EMG to determine if a peripheral nerve issue.  - Follow up in 6 weeks with neuromuscular attending/AP. She will require a EMG/NCS within 6 weeks. Lower extremity pain, left  Assessment & Plan  - Reporting ongoing pain in her left lower extremity, most localized to the left knee   - Left femoral greater trochanteric fracture s/p ORIF   - Knee XR: negative   - LLE duplex: negative   - Ortho is following, appreciate recommendations   • WBAT to the left lower extremity. • Given patients ongoing difficulty with foot drop/ankle dorsiflexion weakness, suggest multipodus boot/bracing - patient may benefit to avoid chronic flexion contracture. COPD (chronic obstructive pulmonary disease) ([facility])  Assessment & Plan  - Evidence of COPD on CT chest in 2018   - No history of PFTs performed   - No home oxygen requirement   - Uses Primatene mist at home   - Pulmonary following, appreciate recommendations   - Continue scheduled nebulized regimen of Atrovent/Xopenex/Budesonide    - Patient is currently requiring 4 L NC to maintain saturations 88-90%   - Unable to wean oxygen at this time   - Continue to hold off on oral steroids for now   - Encourage incentive spirometer use    Hyperglycemia  Assessment & Plan  - Having repeated episodes of hyperglycemia to 218, 241, 239 on 7/18   - AM glucose checks have been normal   - No known history of diabetes   - Initiated insulin sliding scale on 7/18  - A1C 6   - Will need outpatient management              Bowel Regimen: Senna   VTE Prophylaxis:Enoxaparin (Lovenox)     Disposition: Pending insurance authorization for disposition to rehab     Subjective   Chief Complaint: Still reporting some paresthesias, cramping and discomfort to the left lower extremity, mostly localized to the left knee.   Eating and drinking. Having daily bowel movements. No acute overnight events. Objective   Vitals:   Temp:  [97.5 °F (36.4 °C)-98.3 °F (36.8 °C)] 98.2 °F (36.8 °C)  HR:  [] 99  Resp:  [17] 17  BP: (111-132)/(78-80) 111/78    I/O       07/17 0701  07/18 0700 07/18 0701  07/19 0700 07/19 0701  07/20 0700    P. O. 240 240     IV Piggyback 50      Total Intake(mL/kg) 290 (6) 240 (5)     Urine (mL/kg/hr)  300 (0.3)     Total Output  300     Net +290 -60                   Physical Exam:   GENERAL APPEARANCE: NAD, resting comfortably in bed     NEURO: GCS 15, no focal neurological deficits, moving all extremities    HEENT: NCAT   CV: mildly tachycardic   LUNGS: coarse breath sounds, NC in place    GI: soft, non-tender   : voiding   MSK: LLE weakness with dorsiflexion consistent with foot drop, 5/5 strength with plantarflexion, neurovascularly intact with 2+ DP pulses; LLE is soft, compressible  SKIN: warm dry     Invasive Devices     Peripheral Intravenous Line  Duration           Peripheral IV 07/14/23 Right Forearm 4 days                      Lab Results: Results: I have personally reviewed all pertinent laboratory/tests results  Imaging: I have personally reviewed pertinent reports.      Other Studies:

## 2023-07-19 NOTE — PLAN OF CARE
Problem: MOBILITY - ADULT  Goal: Maintain or return to baseline ADL function  Description: INTERVENTIONS:  -  Assess patient's ability to carry out ADLs; assess patient's baseline for ADL function and identify physical deficits which impact ability to perform ADLs (bathing, care of mouth/teeth, toileting, grooming, dressing, etc.)  - Assess/evaluate cause of self-care deficits   - Assess range of motion  - Assess patient's mobility; develop plan if impaired  - Assess patient's need for assistive devices and provide as appropriate  - Encourage maximum independence but intervene and supervise when necessary  - Involve family in performance of ADLs  - Assess for home care needs following discharge   - Consider OT consult to assist with ADL evaluation and planning for discharge  - Provide patient education as appropriate  Outcome: Progressing  Goal: Maintains/Returns to pre admission functional level  Description: INTERVENTIONS:  - Perform BMAT or MOVE assessment daily.   - Set and communicate daily mobility goal to care team and patient/family/caregiver. - Collaborate with rehabilitation services on mobility goals if consulted  - Perform Range of Motion  times a day. - Reposition patient every  hours.   - Dangle patient  times a day  - Stand patient  times a day  - Ambulate patient  times a day  - Out of bed to chair  times a day   - Out of bed for meals  times a day  - Out of bed for toileting  - Record patient progress and toleration of activity level   Outcome: Progressing     Problem: PAIN - ADULT  Goal: Verbalizes/displays adequate comfort level or baseline comfort level  Description: Interventions:  - Encourage patient to monitor pain and request assistance  - Assess pain using appropriate pain scale  - Administer analgesics based on type and severity of pain and evaluate response  - Implement non-pharmacological measures as appropriate and evaluate response  - Consider cultural and social influences on pain and pain management  - Notify physician/advanced practitioner if interventions unsuccessful or patient reports new pain  Outcome: Progressing     Problem: INFECTION - ADULT  Goal: Absence or prevention of progression during hospitalization  Description: INTERVENTIONS:  - Assess and monitor for signs and symptoms of infection  - Monitor lab/diagnostic results  - Monitor all insertion sites, i.e. indwelling lines, tubes, and drains  - Monitor endotracheal if appropriate and nasal secretions for changes in amount and color  - Ros[place]y appropriate cooling/warming therapies per order  - Administer medications as ordered  - Instruct and encourage patient and family to use good hand hygiene technique  - Identify and instruct in appropriate isolation precautions for identified infection/condition  Outcome: Progressing  Goal: Absence of fever/infection during neutropenic period  Description: INTERVENTIONS:  - Monitor WBC    Outcome: Progressing     Problem: SAFETY ADULT  Goal: Maintain or return to baseline ADL function  Description: INTERVENTIONS:  -  Assess patient's ability to carry out ADLs; assess patient's baseline for ADL function and identify physical deficits which impact ability to perform ADLs (bathing, care of mouth/teeth, toileting, grooming, dressing, etc.)  - Assess/evaluate cause of self-care deficits   - Assess range of motion  - Assess patient's mobility; develop plan if impaired  - Assess patient's need for assistive devices and provide as appropriate  - Encourage maximum independence but intervene and supervise when necessary  - Involve family in performance of ADLs  - Assess for home care needs following discharge   - Consider OT consult to assist with ADL evaluation and planning for discharge  - Provide patient education as appropriate  Outcome: Progressing  Goal: Maintains/Returns to pre admission functional level  Description: INTERVENTIONS:  - Perform BMAT or MOVE assessment daily.   - Set and communicate daily mobility goal to care team and patient/family/caregiver. - Collaborate with rehabilitation services on mobility goals if consulted  - Perform Range of Motion  times a day. - Reposition patient every  hours.   - Dangle patient  times a day  - Stand patient  times a day  - Ambulate patient  times a day  - Out of bed to chair  times a day   - Out of bed for meals  times a day  - Out of bed for toileting  - Record patient progress and toleration of activity level   Outcome: Progressing  Goal: Patient will remain free of falls  Description: INTERVENTIONS:  - Educate patient/family on patient safety including physical limitations  - Instruct patient to call for assistance with activity   - Consult OT/PT to assist with strengthening/mobility   - Keep Call bell within reach  - Keep bed low and locked with side rails adjusted as appropriate  - Keep care items and personal belongings within reach  - Initiate and maintain comfort rounds  - Make Fall Risk Sign visible to staff  - Offer Toileting every  Hours, in advance of need  - Initiate/Mainta[place]arm  - Obtain necessary fall risk management equipment:   - Apply yellow socks and bracelet for high fall risk patients  - Consider moving patient to room near nurses station  Outcome: Progressing     Problem: DISCHARGE PLANNING  Goal: Discharge to home or other facility with appropriate resources  Description: INTERVENTIONS:  - Identify barriers to discharge w/patient and caregiver  - Arrange for needed discharge resources and transportation as appropriate  - Identify discharge learning needs (meds, wound care, etc.)  - Arrange for interpretive services to assist at discharge as needed  - Refer to Case Management Department for coordinating discharge planning if the patient needs post-hospital services based on physician/advanced practitioner order or complex needs related to functional status, cognitive ability, or social support system  Outcome: Progressing Problem: Knowledge Deficit  Goal: Patient/family/caregiver demonstrates understanding of disease process, treatment plan, medications, and discharge instructions  Description: Complete learning assessment and assess knowledge base.   Interventions:  - Provide teaching at level of understanding  - Provide teaching via preferred learning methods  Outcome: Progressing

## 2023-07-19 NOTE — ASSESSMENT & PLAN NOTE
- Having repeated episodes of hyperglycemia to 218, 241, 239 on 7/18   - AM glucose checks have been normal   - No known history of diabetes   - Initiated insulin sliding scale on 7/18  - A1C 6   - Will need outpatient management

## 2023-07-19 NOTE — CASE MANAGEMENT
[address] [facility] [address] received request for authorization from Care Manager. Authorization request for: [address] [place] Name: Hernandez Cloud [id number]   Facility MD: Cherie Tillman NPI [id number]  Authorization initiated by contacting insurance: [place] ([employer] ([facility] Via: Phone. [phone]   Rep: Omar Persons   Pending Reference #: [id number]  Clinicals submitted via: Fax.  [phone]

## 2023-07-19 NOTE — UTILIZATION REVIEW
Continued Stay Review    Date: 7/19/23                          Current Patient Class:  IP  Current Level of Care:  MS    HPI:67 y.o. female with COPD initially admitted on 7/14/23 with Fall/fracture of greater trochanter left femur/weakness of LLE/COPD. S/P L IM Femur nailing 7/15/23. Neurology recommended LLE EMG as outpatient for  LLE weakness that pt had x 1 yr .     7/17- tachycardic, coarse breath sounds. Venous duplex BLE and CTA chest neg . CXR shows emphysema . Continue budesonide, atrovent, xopenex standing. Ortho recommendsmultipodus boot/bracing due to ongoing L ft drop//ankle dorsiflexion weakness. >2 Gm drop Hgb . Monitor for ABLA . 7/18  ongoing pain LLE, localized l knee. F/U XR L Knee  . PT/OT recommend post acute rehab . Sp02 decreased w/ ambulation to 84% and 86% with stair trials per PT . Assessment/Plan: 7/19 POD #4  XR L knee 7/17 showed nothing acute. Still reporting some paresthesias, cramping and discomfort to the left lower extremity, mostly localized to the left knee. +2 LLE edema per nsg. WBAT LLE . Maintain cockup wrist splint for comfort at this time, feels L hand pain improved . LUE bruising . Will plan for repeat XRAYs as outpt . Continues with tachycardia . GCS 15 .  Spirometry 500 ml achieved with goal 1000ml . On O2 @4 Lnc  Having repeated episodes of hyperglycemia to 218, 241, 239 on 7/18 , no known hx DM. Pt started on SSI 7/18. A1 c = 6. Glucose improved today  Pt will need outpt mgmt . Hgb 12.3 today . Pt continues on IV Ancef Pending insurance authorization for disposition to rehab .       Vital Signs:   Date/Time Temp Pulse Resp BP MAP (mmHg) SpO2 Calculated FIO2 (%) - Nasal Cannula Nasal Cannula O2 Flow Rate (L/min) O2 Device   07/19/23 0855 -- -- -- -- -- -- 36 4 L/min Nasal cannula   07/19/23 07:46:51 97.9 °F (36.6 °C) 111 Abnormal  20 131/83 99 92 % -- -- --   07/19/23 0721 -- -- -- -- -- 96 % 36 4 L/min Nasal cannula   07/18/23 21:24:35 98.2 °F (36.8 °C) 99 -- 111/78 89 96 % -- -- --   07/18/23 1958 -- -- -- -- -- 95 % -- -- --   07/18/23 17:38:19 98.3 °F (36.8 °C) 119 Abnormal  -- -- -- 88 % Abnormal  -- -- --   07/18/23 14:50:58 97.5 °F (36.4 °C) 118 Abnormal  17 132/80 97 94 % -- -- --   07/18/23 1343 -- -- -- -- -- 96 % -- -- --   07/18/23 1300 -- -- -- -- -- 96 % 36 4 L/min Nasal cannula   07/18/23 0900 -- -- -- -- -- -- -- -- --   07/18/23 07:24:26 97.7 °F (36.5 °C) 103 -- 114/87 96 99 % -- -- --   07/18/23 0700 -- 107 Abnormal  97 Abnormal  -- -- 96 % 36 4 L/min Nasal cannula   07/17/23 22:00:36 98.8 °F (37.1 °C) 112 Abnormal  18 129/89 102 95 % -- -- --   07/17/23 19:19:20 98.5 °F (36.9 °C) -- 18 117/91 100 -- -- -- --   07/17/23 15:29:51 98.5 °F (36.9 °C) 108 Abnormal  17 141/83 102 93 % 40 5 L/min Nasal cannula     Date and Time R Radial Pulse L Radial Pulse R Pedal Pulse L Pedal Pulse   07/18/23 2300 +2 +2 +2 +2   07/18/23 0900 +2 -- -- --   07/17/23 2100 +2 +2 +2 +2     Date and Time Eye Opening Best Verbal Response Best Motor Response [place] Coma Scale Score   07/19/23 0855 4 5 6 15   07/18/23 2300 4 5 6 15   07/18/23 0900 4 5 6 15   07/17/23 2100 4 5 6 15         Pertinent Labs/Diagnostic Results:   7/15   CXR- COPD. No acute cardiopulmonary disease.   Probable nipple shadow right lung base. This could be confirmed with lateral view if warranted  XR L hip/pelvis- Fluoroscopic guidance provided for procedure guidance. Please refer to the separate procedure notes for additional details  7/17   Venous duplex BLE- RIGHT LOWER LIMB:  No evidence of acute or chronic deep vein thrombosis. No evidence of superficial thrombophlebitis noted. Doppler evaluation shows a normal response to augmentation maneuvers. Popliteal, posterior tibial and anterior tibial arterial Doppler waveforms are  triphasic. Note: There is a well defined hypoechoic non-vascularized cystic-type structure  noted in the popliteal fossa.   LEFT LOWER LIMB:  No evidence of acute or chronic deep vein thrombosis. No evidence of superficial thrombophlebitis noted. Doppler evaluation shows a normal response to augmentation maneuvers. Popliteal, posterior tibial and anterior tibial arterial Doppler waveforms are  triphasic. CXR- Emphysema. No focal airspace consolidation  CTA chest- PE study- No pulmonary embolus.   Mild diffuse bronchial wall thickening and mild bilateral lower lobe endobronchial debris, likely smoking related bronchitis  XR L knee- No acute osseous abnormality.             Results from last 7 days   Lab Units 07/18/23  0959 07/16/23  0445 07/15/23  0447 07/14/23  2041   WBC Thousand/uL 5.16 7.09 7.48 7.87   HEMOGLOBIN g/dL 12.3 13.3 14.8 14.7   HEMATOCRIT % 37.9 41.2 44.9 44.1   PLATELETS Thousands/uL 193 160 179 174   NEUTROS ABS Thousands/µL 3.79 4.85 5.38 5.98         Results from last 7 days   Lab Units 07/18/23  0930 07/18/23  0719 07/17/23  0450 07/16/23 0445 07/15/23  0447   SODIUM mmol/L 135 122* 136 138 138   POTASSIUM mmol/L 3.7 3.0* 4.3 3.6 3.6   CHLORIDE mmol/L 98 67* 98 102 104   CO2 mmol/L 34* 26 36* 33* 27   ANION GAP mmol/L 3 29 2 3 7   BUN mg/dL 16 11 18 23 24   CREATININE mg/dL 0.44* 5.35* 0.47* 0.55* 0.54*   EGFR ml/min/1.73sq m 106 7 103 98 99   CALCIUM mg/dL 8.9 6.0* 8.7 8.5 8.8         Results from last 7 days   Lab Units 07/19/23  1118 07/19/23  0742 07/18/23  2118 07/18/23  1613 07/18/23  0935   POC GLUCOSE mg/dl 155* 118 134 126 239*     Results from last 7 days   Lab Units 07/18/23  0930 07/18/23  0719 07/17/23  0450 07/16/23  0445 07/15/23  0447 07/14/23  2041   GLUCOSE RANDOM mg/dL 241* 792* 110 152* 96 218*         Results from last 7 days   Lab Units 07/18/23  0959   HEMOGLOBIN A1C % 6.0*   EAG mg/dl 126     No results found for: "BETA-HYDROXYBUTYRATE"                           Results from last 7 days   Lab Units 07/14/23  2041   PROTIME seconds 13.8   INR  1.04   PTT seconds 31                         Medications:   Scheduled Medications:  budesonide, 0.5 mg, Nebulization, BID  cefazolin, 2,000 mg, Intravenous, Q8H  enoxaparin, 30 mg, Subcutaneous, Q12H DEBBIE  insulin detemir, 18 Units, Subcutaneous, HS  insulin lispro, 1-6 Units, Subcutaneous, TID AC  ipratropium, 0.5 mg, Nebulization, TID  levalbuterol, 1.25 mg, Nebulization, TID  nicotine, 7 mg, Transdermal, Daily  senna, 2 tablet, Oral, Daily      Continuous IV Infusions:     PRN Meds:  acetaminophen, 975 mg, Oral, Q6H PRN  methocarbamol, 500 mg, Oral, Q6H PRN  ondansetron, 4 mg, Intravenous, Q4H PRN  oxyCODONE, 5 mg, Oral, Q4H PRN  oxyCODONE, 2.5 mg, Oral, Q4H PRN        Discharge Plan: [facility]    Network Utilization Review Department  ATTENTION: Please call with any questions or concerns to [phone] and carefully listen to the prompts so that you are directed to the right person. All voicemails are confidential.  Rudolph Cates all requests for admission clinical reviews, approved or denied determinations and any other requests to dedicated fax number below belonging to the campus where the patient is receiving treatment.  List of dedicated fax numbers for the Facilities:  [place] DENIALS (Administrative/Medical Necessity) [phone]   [address] [facility] (Maternity/NICU/Pediatrics) [phone]   [address] [place] [address] [phone]   [facility] [address] [facility] [phone]   [facility] [address] [facility] [id number] [place] [address] [phone]   [address] [facility] [address] [facility] [phone]

## 2023-07-20 VITALS
RESPIRATION RATE: 15 BRPM | DIASTOLIC BLOOD PRESSURE: 86 MMHG | SYSTOLIC BLOOD PRESSURE: 122 MMHG | HEART RATE: 110 BPM | WEIGHT: 106.4 LBS | BODY MASS INDEX: 19.58 KG/M2 | TEMPERATURE: 98.2 F | OXYGEN SATURATION: 95 % | HEIGHT: 62 IN

## 2023-07-20 LAB
GLUCOSE SERPL-MCNC: 140 MG/DL (ref 65–140)
GLUCOSE SERPL-MCNC: 82 MG/DL (ref 65–140)

## 2023-07-20 PROCEDURE — 99238 HOSP IP/OBS DSCHRG MGMT 30/<: CPT | Performed by: STUDENT IN AN ORGANIZED HEALTH CARE EDUCATION/TRAINING PROGRAM

## 2023-07-20 PROCEDURE — 82948 REAGENT STRIP/BLOOD GLUCOSE: CPT

## 2023-07-20 PROCEDURE — 94760 N-INVAS EAR/PLS OXIMETRY 1: CPT

## 2023-07-20 PROCEDURE — 99024 POSTOP FOLLOW-UP VISIT: CPT | Performed by: PHYSICIAN ASSISTANT

## 2023-07-20 PROCEDURE — 94640 AIRWAY INHALATION TREATMENT: CPT

## 2023-07-20 RX ORDER — ASPIRIN 325 MG
325 TABLET ORAL 2 TIMES DAILY
Status: SHIPPED | OUTPATIENT
Start: 2023-07-20 | End: 2023-08-17

## 2023-07-20 RX ORDER — METHOCARBAMOL 500 MG/1
500 TABLET, FILM COATED ORAL EVERY 6 HOURS PRN
Qty: 30 TABLET | Refills: 0 | Status: SHIPPED | OUTPATIENT
Start: 2023-07-20 | End: 2023-08-03

## 2023-07-20 RX ORDER — BUDESONIDE 0.5 MG/2ML
0.5 INHALANT ORAL
Qty: 120 ML | Refills: 0 | Status: SHIPPED | OUTPATIENT
Start: 2023-07-20 | End: 2023-08-19

## 2023-07-20 RX ORDER — LEVALBUTEROL INHALATION SOLUTION 1.25 MG/3ML
1.25 SOLUTION RESPIRATORY (INHALATION)
Qty: 270 ML | Refills: 0 | Status: SHIPPED | OUTPATIENT
Start: 2023-07-20 | End: 2023-08-19

## 2023-07-20 RX ADMIN — BUDESONIDE 0.5 MG: 0.5 INHALANT ORAL at 07:20

## 2023-07-20 RX ADMIN — ENOXAPARIN SODIUM 30 MG: 30 INJECTION SUBCUTANEOUS at 09:04

## 2023-07-20 RX ADMIN — LEVALBUTEROL HYDROCHLORIDE 1.25 MG: 1.25 SOLUTION RESPIRATORY (INHALATION) at 13:34

## 2023-07-20 RX ADMIN — IPRATROPIUM BROMIDE 0.5 MG: 0.5 SOLUTION RESPIRATORY (INHALATION) at 07:20

## 2023-07-20 RX ADMIN — IPRATROPIUM BROMIDE 0.5 MG: 0.5 SOLUTION RESPIRATORY (INHALATION) at 13:34

## 2023-07-20 RX ADMIN — SENNOSIDES 17.2 MG: 8.6 TABLET, FILM COATED ORAL at 09:04

## 2023-07-20 RX ADMIN — LEVALBUTEROL HYDROCHLORIDE 1.25 MG: 1.25 SOLUTION RESPIRATORY (INHALATION) at 07:20

## 2023-07-20 RX ADMIN — CEFAZOLIN SODIUM 2000 MG: 2 SOLUTION INTRAVENOUS at 05:45

## 2023-07-20 NOTE — INCIDENTAL FINDINGS
The following findings require follow up:  Radiographic finding   Finding: CT chest reveals- Moderate upper lobe predominant panlobular emphysema. Benign calcified granulomas. Calcified subcarinal and right hilar nodes indicating benign granulomatous disease. Mild diffuse bronchial wall thickening and mild bilateral lower lobe endobronchial debris, likely smoking related bronchitis. Follow up required:  Follow up with PCP following discharge    Follow up should be done within 1 week(s)    Please notify the following clinician to assist with the follow up:   Dr. Arleth Ryan- regarding incidental findings and regarding recent hospitalization and for further management of your COPD

## 2023-07-20 NOTE — PROGRESS NOTES
Progress Note - Orthopedics   Yves Jenkins 74 y.o. female MRN: [id number]  Unit/Bed#: W -01      Subjective:    74 y. o.female seen and examined at bedside. She has no new complaints this AM. Still voices concerns over her left foot weakness.      Labs:  0   Lab Value Date/Time    HCT 37.9 07/18/2023 0959    HCT 41.2 07/16/2023 0445    HCT 44.9 07/15/2023 0447    HGB 12.3 07/18/2023 0959    HGB 13.3 07/16/2023 0445    HGB 14.8 07/15/2023 0447    INR 1.04 07/14/2023 2041    WBC 5.16 07/18/2023 0959    WBC 7.09 07/16/2023 0445    WBC 7.48 07/15/2023 0447    ESR 16 09/27/2018 1031       Meds:    Current Facility-Administered Medications:   •  acetaminophen (TYLENOL) tablet 975 mg, 975 mg, Oral, Q6H PRN, Lorri Hernandez PA-C  •  budesonide (PULMICORT) inhalation solution 0.5 mg, 0.5 mg, Nebulization, BID, Verona Milner, DO, 0.5 mg at 07/20/23 0720  •  ceFAZolin (ANCEF) IVPB (premix in dextrose) 2,000 mg 50 mL, 2,000 mg, Intravenous, Q8H, Lorri Hernandez PA-C, Last Rate: 100 mL/hr at 07/20/23 0545, 2,000 mg at 07/20/23 0545  •  enoxaparin (LOVENOX) subcutaneous injection 30 mg, 30 mg, Subcutaneous, Q12H [address], Lorri Hernandez PA-C, 30 mg at 07/20/23 9984  •  insulin detemir (LEVEMIR) subcutaneous injection 18 Units, 18 Units, Subcutaneous, HS, Kelechi Sanchez, DO, 18 Units at 07/19/23 2128  •  insulin lispro (HumaLOG) 100 units/mL subcutaneous injection 1-6 Units, 1-6 Units, Subcutaneous, TID AC **AND** Fingerstick Glucose (POCT), , , 4x Daily AC and at bedtime, Kelechi Sanchez, DO  •  ipratropium (ATROVENT) 0.02 % inhalation solution 0.5 mg, 0.5 mg, Nebulization, TID, Mansoor Jurado MD, 0.5 mg at 07/20/23 0720  •  levalbuterol (XOPENEX) inhalation solution 1.25 mg, 1.25 mg, Nebulization, TID, Mansoor Jurado MD, 1.25 mg at 07/20/23 0720  •  methocarbamol (ROBAXIN) tablet 500 mg, 500 mg, Oral, Q6H PRN, Lorri Hernandez PA-C, 500 mg at 07/16/23 1240  • nicotine (NICODERM CQ) 7 mg/24hr TD 24 hr patch 7 mg, 7 mg, Transdermal, Daily, Lorri Hernandez PA-C  •  ondansetron (ZOFRAN) injection 4 mg, 4 mg, Intravenous, Q4H PRN, Lorri Hernandez PA-C  •  oxyCODONE (ROXICODONE) IR tablet 5 mg, 5 mg, Oral, Q4H PRN, Lorri Hernandez PA-C, 5 mg at 07/16/23 2335  •  oxyCODONE (ROXICODONE) split tablet 2.5 mg, 2.5 mg, Oral, Q4H PRN, Lorri Hernandez PA-C  •  senna (SENOKOT) tablet 17.2 mg, 2 tablet, Oral, Daily, Lorri Hernandez PA-C, 17.2 mg at 07/20/23 6840    Blood Culture:   No results found for: "BLOODCX"    Wound Culture:   No results found for: "WOUNDCULT"    Ins and Outs:  I/O last 24 hours: In: 4852 [P.O.:1740; IV Piggyback:50]  Out: -           Physical:  Vitals:    07/20/23 0758   BP: 122/86   Pulse: (!) 110   Resp: 15   Temp: 98.2 °F (36.8 °C)   SpO2: 96%     Musculoskeletal: left Lower Extremity  · Surgical dressings c/d/i without significant strike through  · Moderate gagandeep incisional ttp. · Sensation intact to saphenous, sural, tibial, superficial peroneal nerve, and deep peroneal  · Motor intact to +FHL/EHL, +ankle plantar flexion, she has +foot drop   · 2+ DP pulse, symmetric bilaterally  · Digits warm and well perfused  · Capillary refill < 2 seconds    Assessment:    74 y. o.female s/p insertion intramedullary nail left femur with Dr. Gallito Medina 7/15/2023. Patient doing well. Plan:  · WBAT to the left lower extremity. · Management of left foot drop per primary/neurosurgery. · Maintain cockup wrist splint for comfort at this time. Will plan for repeat XRAYs as outpatient. · Hgb 12.3. Will monitor for ABLA and administer IVF/prbc as indicated for Greater than 2 gram drop or Hgb < 7  · PT/OT  · Pain control per primary team.   · DVT ppx: lovenox while admitted, suggest aspirin 325mg BID upon discharge. · Medical management per primary team.   · Dispo: Ortho stable. Ortho will follow peripherally at this time.    · Patient will need follow up with Dr. Jennifer Roman upon discharge.      Hodan Garcia PA-C

## 2023-07-20 NOTE — CASE MANAGEMENT
Case Management Discharge Planning Note    Patient name Allyssa Solis  Location W /W -01 MRN [id number]  : 1955 Date 2023       Current Admission Date: 2023  Current Admission Diagnosis:Fall   Patient Active Problem List    Diagnosis Date Noted   • Lower extremity pain, left 2023   • Hyperglycemia 2023   • Fall 2023   • Closed nondisplaced fracture of greater trochanter of left femur ([address]) 2023   • Weakness of left lower extremity 2023   • COPD (chronic obstructive pulmonary disease) ([address]) 2023      LOS (days): 6  Geometric Mean LOS (GMLOS) (days):   Days to GMLOS:     OBJECTIVE:  Risk of Unplanned Readmission Score: 7.58         Current admission status: Inpatient   Preferred Pharmacy:   [address] [place], [place] - [address] [place] [address]  Phone: [phone] Fax: [phone]    Primary Care Provider: Aby Grullon MD    Primary Insurance: [employer]  Secondary Insurance:     DISCHARGE DETAILS:    Discharge planning discussed with[de-identified] pt and   Freedom of Choice: Yes  Comments - Freedom of Choice: Pt is medically stable for DC and auth has been received from [employer].  Pt then explained her stay would be covered by [employer]. She was able to provide me with the information for Aimee Parekh at [phone] with a claim number of [id number]. CM contacted Kelsey who stated pt will be covered to go to [facility]. Contacts  Patient Contacts:   Relationship to Patient[de-identified] Family  Contact Method: In Person  Reason/Outcome: Continuity of Care, Emergency Contact, Discharge Planning, Referral              Other Referral/Resources/Interventions Provided:  Interventions: Transportation  Referral Comments: Pt is stable for DC and transportation is needed. Pt is appropriate for a BLS and referral has been made requesting a BLS transport for 1430 to go to [facility].   [facility] will be transporting pt at 1430. All parties involved have been notified of DC arrangements.          Treatment Team Recommendation: Acute Rehab  Discharge Destination Plan[de-identified] Acute Rehab  Transport at Discharge : [facility]  Dispatcher Contacted: Yes  Number/Name of Dispatcher: roundtrip  Transported by [employer] and Unit #): Darline p  ETA of Transport (Date): 07/20/23  ETA of Transport (Time): 1430     Transfer Mode: Stretcher  Accompanied by: EMS personnel  Transfer Equipment: BLS devices

## 2023-07-20 NOTE — CASE MANAGEMENT
Case Management Discharge Planning Note    Patient name Gary Plan  Location W /W -01 MRN [id number]  : 1955 Date 2023       Current Admission Date: 2023  Current Admission Diagnosis:Fall   Patient Active Problem List    Diagnosis Date Noted   • Lower extremity pain, left 2023   • Hyperglycemia 2023   • Fall 2023   • Closed nondisplaced fracture of greater trochanter of left femur ([address]) 2023   • Weakness of left lower extremity 2023   • COPD (chronic obstructive pulmonary disease) ([address]) 2023      LOS (days): 6  Geometric Mean LOS (GMLOS) (days):   Days to GMLOS:     OBJECTIVE:  Risk of Unplanned Readmission Score: 7.56         Current admission status: Inpatient   Preferred Pharmacy:   [address] [place], [address] - [address] [place] [address] Road [address]  Phone: [phone] Fax: [phone]    Primary Care Provider: Aspen Jimenez MD    Primary Insurance: [facility] [address]:     [address] [place] [address] Rd Number: [id number]

## 2023-07-20 NOTE — PLAN OF CARE
Problem: MOBILITY - ADULT  Goal: Maintain or return to baseline ADL function  Description: INTERVENTIONS:  -  Assess patient's ability to carry out ADLs; assess patient's baseline for ADL function and identify physical deficits which impact ability to perform ADLs (bathing, care of mouth/teeth, toileting, grooming, dressing, etc.)  - Assess/evaluate cause of self-care deficits   - Assess range of motion  - Assess patient's mobility; develop plan if impaired  - Assess patient's need for assistive devices and provide as appropriate  - Encourage maximum independence but intervene and supervise when necessary  - Involve family in performance of ADLs  - Assess for home care needs following discharge   - Consider OT consult to assist with ADL evaluation and planning for discharge  - Provide patient education as appropriate  Outcome: Progressing  Goal: Maintains/Returns to pre admission functional level  Description: INTERVENTIONS:  - Perform BMAT or MOVE assessment daily.   - Set and communicate daily mobility goal to care team and patient/family/caregiver. - Collaborate with rehabilitation services on mobility goals if consulted  - Perform Range of Motion  times a day. - Reposition patient every  hours.   - Dangle patient  times a day  - Stand patient  times a day  - Ambulate patient  times a day  - Out of bed to chair  times a day   - Out of bed for meals  times a day  - Out of bed for toileting  - Record patient progress and toleration of activity level   Outcome: Progressing     Problem: PAIN - ADULT  Goal: Verbalizes/displays adequate comfort level or baseline comfort level  Description: Interventions:  - Encourage patient to monitor pain and request assistance  - Assess pain using appropriate pain scale  - Administer analgesics based on type and severity of pain and evaluate response  - Implement non-pharmacological measures as appropriate and evaluate response  - Consider cultural and social influences on pain and pain management  - Notify physician/advanced practitioner if interventions unsuccessful or patient reports new pain  Outcome: Progressing     Problem: INFECTION - ADULT  Goal: Absence or prevention of progression during hospitalization  Description: INTERVENTIONS:  - Assess and monitor for signs and symptoms of infection  - Monitor lab/diagnostic results  - Monitor all insertion sites, i.e. indwelling lines, tubes, and drains  - Monitor endotracheal if appropriate and nasal secretions for changes in amount and color  - [place] appropriate cooling/warming therapies per order  - Administer medications as ordered  - Instruct and encourage patient and family to use good hand hygiene technique  - Identify and instruct in appropriate isolation precautions for identified infection/condition  Outcome: Progressing  Goal: Absence of fever/infection during neutropenic period  Description: INTERVENTIONS:  - Monitor WBC    Outcome: Progressing     Problem: SAFETY ADULT  Goal: Maintain or return to baseline ADL function  Description: INTERVENTIONS:  -  Assess patient's ability to carry out ADLs; assess patient's baseline for ADL function and identify physical deficits which impact ability to perform ADLs (bathing, care of mouth/teeth, toileting, grooming, dressing, etc.)  - Assess/evaluate cause of self-care deficits   - Assess range of motion  - Assess patient's mobility; develop plan if impaired  - Assess patient's need for assistive devices and provide as appropriate  - Encourage maximum independence but intervene and supervise when necessary  - Involve family in performance of ADLs  - Assess for home care needs following discharge   - Consider OT consult to assist with ADL evaluation and planning for discharge  - Provide patient education as appropriate  Outcome: Progressing  Goal: Maintains/Returns to pre admission functional level  Description: INTERVENTIONS:  - Perform BMAT or MOVE assessment daily.   - Set and communicate daily mobility goal to care team and patient/family/caregiver. - Collaborate with rehabilitation services on mobility goals if consulted  - Perform Range of Motion  times a day. - Reposition patient every  hours.   - Dangle patient  times a day  - Stand patient  times a day  - Ambulate patient  times a day  - Out of bed to chair  times a day   - Out of bed for meals  times a day  - Out of bed for toileting  - Record patient progress and toleration of activity level   Outcome: Progressing  Goal: Patient will remain free of falls  Description: INTERVENTIONS:  - Educate patient/family on patient safety including physical limitations  - Instruct patient to call for assistance with activity   - Consult OT/PT to assist with strengthening/mobility   - Keep Call bell within reach  - Keep bed low and locked with side rails adjusted as appropriate  - Keep care items and personal belongings within reach  - Initiate and maintain comfort rounds  - Make Fall Risk Sign visible to staff  - Offer Toileting every  Hours, in advance of need  - Initiate/Maintain alarm  - Obtain necessary fall risk management equipment:   - Apply yellow socks and bracelet for high fall risk patients  - Consider moving patient to room near nurses station  Outcome: Progressing     Problem: DISCHARGE PLANNING  Goal: Discharge to home or other facility with appropriate resources  Description: INTERVENTIONS:  - Identify barriers to discharge w/patient and caregiver  - Arrange for needed discharge resources and transportation as appropriate  - Identify discharge learning needs (meds, wound care, etc.)  - Arrange for interpretive services to assist at discharge as needed  - Refer to Case Management Department for coordinating discharge planning if the patient needs post-hospital services based on physician/advanced practitioner order or complex needs related to functional status, cognitive ability, or social support system  Outcome: Progressing

## 2023-07-20 NOTE — DISCHARGE SUMMARY
[address]  Discharge- Stephanie Sycamore 1955, 74 y.o. female MRN: [id number]  Unit/Bed#: W -09 Encounter: [id number]  Primary Care Provider: Abilio Riggs MD   Date and time admitted to hospital: 7/14/2023  1:43 PM    [address]  - Status post fall with the below noted injuries. - Fall precautions. - Geriatric Medicine consultation for evaluation, medication review and recommendations.  - PT and OT evaluation and treatment as indicated. - Case Management consultation for disposition planning. Closed nondisplaced fracture of greater trochanter of left femur (HCC)  Assessment & Plan  - Nondisplaced greater trochanteric fracture, present on admission.  - Status post ground level mechanical fall on 7/13. - Appreciate Orthopedic surgery evaluation, recommendations and interventions as noted. - Status post insertion IM nail left femur 7/15.   - WBAT to left lower extremity.  - Monitor left lower extremity neurovascular exam.  - Continue multimodal analgesic regimen.  - Continue DVT prophylaxis. - PT and OT evaluation and treatment as indicated. - Outpatient follow up with Orthopedic surgery in 2 weeks with Dr. Rachelle Molina for re-evaluation. Weakness of left lower extremity  Assessment & Plan  - Reports left lower extremity weakness, ongoing x 1 year. - 2/5 strength with dorsiflexion and plantarflexion, no movement against gravity, in left lower extremity.  - No numbness, no saddle anesthesia, no bowel or bladder incontinence, normal rectal tone, post-void residual 0.   - 7/14 CT lumber spine:  No acute osseous abnormality. Degenerative change without high-grade canal or foraminal stenosis, most pronounced at L3-4 with mild canal and foraminal narrowing.    - 7/14 MRI lumbar spine: Minor noncompressive lumbar degenerative change. No cauda equina or foraminal nerve impingement.   - Consulted neurosurgery, appreciate recommendations.    - No acute neurosurgical intervention indicated at this time   - Inability to dorsiflex with a foot drop is unexplained by the CT and MRI completed this admission.   - No other focal neurological deficits at this time     - Can consider outpatient EMG to determine if a peripheral nerve issue.  - Follow up in 6 weeks with neuromuscular attending/AP. She will require a EMG/NCS within 6 weeks. Lower extremity pain, left  Assessment & Plan  - Reporting ongoing pain in her left lower extremity, most localized to the left knee   - Left femoral greater trochanteric fracture s/p ORIF   - Knee XR: negative   - LLE duplex: negative   - Ortho is following, appreciate recommendations   • WBAT to the left lower extremity. • Given patients ongoing difficulty with foot drop/ankle dorsiflexion weakness, suggest multipodus boot/bracing - patient may benefit to avoid chronic flexion contracture. COPD (chronic obstructive pulmonary disease) ([facility])  Assessment & Plan  - Evidence of COPD on CT chest in 2018   - No history of PFTs performed   - No home oxygen requirement   - Uses Primatene mist at home   - Pulmonary following, appreciate recommendations   - Continue scheduled nebulized regimen of Atrovent/Xopenex/Budesonide    - Patient is currently requiring 4 L NC to maintain saturations 88-90%   - Unable to wean oxygen at this time   - Continue to hold off on oral steroids for now   - Encourage incentive spirometer use    Hyperglycemia  Assessment & Plan  - Having repeated episodes of hyperglycemia to 218, 241, 239 on 7/18   - AM glucose checks have been normal   - No known history of diabetes   - Initiated insulin sliding scale on 7/18  - A1C 6   - Will need outpatient management         Subjective:   No acute overnight events. No new concerns. Patient pending disposition today to [facility] rehab facility.       Physical Exam:   GENERAL APPEARANCE: NAD, resting comfortably in bed     NEURO: GCS 15, no focal neurological deficits, moving all extremities    HEENT: NCAT   CV: tachycardic   LUNGS: coarse breath sounds, NC in place    GI: soft, non-tender   : voiding   MSK: baseline LLE weakness with foot drop, neurovascularly intact with 2+ DP pulses; moving all extremities   SKIN: warm dry, cap refill < 2 seconds       Medical Problems     Resolved Problems  Date Reviewed: 7/15/2023   None         Admission Date:   Admission Orders (From admission, onward)     Ordered        07/14/23 2018  Inpatient Admission  Once                        Admitting Diagnosis: Hip pain [M25.559]  Left leg weakness [R29.898]  Greater trochanter fracture ([address]) [S72.113A]  Closed nondisplaced fracture of greater trochanter of left femur, initial encounter ([address]) [S72.115A]  Weakness of left lower extremity [R29.898]    HPI: Fred Addison is a 74 y.o. female who presents with ground level mechanical fall. Patient was walking outside yesterday afternoon around 430 PM, when she heard a siren, became startled, and lost her footing causing her to fall onto her left side. Denies head strike or loss of consciousness. Patient was still having pain in her left hip and left wrist today so she went to Urgent care, who sent her to the ED for further evaluation. Trauma imaging reveals left greater trochanteric fracture. XR wrist was negative but patient was placed in splint for comfort. Of note, patient has had chronic left lower extremity weakness, ongoing for the past year. Patient states this did not contribute to this particular fall, but her left knee has been giving out intermittently over the last year. No back pain, no saddle anesthesia, no urinary retention, no bowel or bladder incontinence. Procedures Performed: No orders of the defined types were placed in this encounter. Summary of Hospital Course:  74year old female status post fall where she sustained left greater trochanteric fracture.   Patient went to OR with ortho and is status post insertion IM nail left femur on 7/15/23. Patient can weight bear as tolerated to the left lower extremity. Patient will follow up with orthopedic surgery in 2 weeks. Patient also notes left lower extremity weakness ongoing for a year. Neurology and neurosurgery evaluated the patient during her hospital stay. Imaging including MRI was unremarkable and did not explain patient's symptoms of foot drop. Patient is scheduled to undergo EMG as outpatient to evaluate for peripheral nerve issue. Patient will follow up with neuromuscular attending in 6 weeks. Patient also had evidence of uncontrolled COPD at baseline. We consulted pulmonology for further recommendations. Patient was placed on scheduled regimen of Atrovent, Xopenex, budesonide. Patient has required supplemental nasal cannula during her hospitalization which appears to be her new baseline. Recommended follow-up with primary doctor following discharge. Patient also had some episodes of hyperglycemia during her hospital stay. A1c was in the prediabetic range. Also recommended follow-up with PCP at time of discharge. Complications: None     Condition at Discharge: stable         Discharge instructions/Information to patient and family:   See after visit summary for information provided to patient and family. Provisions for Follow-Up Care:  See after visit summary for information related to follow-up care and any pertinent home health orders. PCP: Melodee Osler, MD    Disposition: See After Visit Summary for discharge disposition information. Planned Readmission: No    Discharge Statement   I spent 20 minutes discharging the patient. This time was spent on the day of discharge. I had direct contact with the patient on the day of discharge. Additional documentation is required if more than 30 minutes were spent on discharge. Discharge Medications:  See after visit summary for reconciled discharge medications provided to patient and family.

## 2023-07-20 NOTE — ASSESSMENT & PLAN NOTE
- Nondisplaced greater trochanteric fracture, present on admission.  - Status post ground level mechanical fall on 7/13. - Appreciate Orthopedic surgery evaluation, recommendations and interventions as noted. - Status post insertion IM nail left femur 7/15.   - WBAT to left lower extremity.  - Monitor left lower extremity neurovascular exam.  - Continue multimodal analgesic regimen.  - Continue DVT prophylaxis. - PT and OT evaluation and treatment as indicated. - Outpatient follow up with Orthopedic surgery in 2 weeks with Dr. Arleth Napier for re-evaluation.

## 2023-07-20 NOTE — PLAN OF CARE
Problem: MOBILITY - ADULT  Goal: Maintain or return to baseline ADL function  Description: INTERVENTIONS:  -  Assess patient's ability to carry out ADLs; assess patient's baseline for ADL function and identify physical deficits which impact ability to perform ADLs (bathing, care of mouth/teeth, toileting, grooming, dressing, etc.)  - Assess/evaluate cause of self-care deficits   - Assess range of motion  - Assess patient's mobility; develop plan if impaired  - Assess patient's need for assistive devices and provide as appropriate  - Encourage maximum independence but intervene and supervise when necessary  - Involve family in performance of ADLs  - Assess for home care needs following discharge   - Consider OT consult to assist with ADL evaluation and planning for discharge  - Provide patient education as appropriate  7/20/2023 1549 by Octavia Correa RN  Outcome: Completed  7/20/2023 1441 by Octavia Correa RN  Outcome: Adequate for Discharge  Goal: Maintains/Returns to pre admission functional level  Description: INTERVENTIONS:  - Perform BMAT or MOVE assessment daily.   - Set and communicate daily mobility goal to care team and patient/family/caregiver. - Collaborate with rehabilitation services on mobility goals if consulted  - Perform Range of Motion  times a day. - Reposition patient every  hours.   - Dangle patient  times a day  - Stand patient  times a day  - Ambulate patient  times a day  - Out of bed to chair  times a day   - Out of bed for meals  times a day  - Out of bed for toileting  - Record patient progress and toleration of activity level   7/20/2023 1549 by Octavia Correa RN  Outcome: Completed  7/20/2023 1441 by Octavia Correa RN  Outcome: Adequate for Discharge     Problem: PAIN - ADULT  Goal: Verbalizes/displays adequate comfort level or baseline comfort level  Description: Interventions:  - Encourage patient to monitor pain and request assistance  - Assess pain using appropriate pain scale  - Administer analgesics based on type and severity of pain and evaluate response  - Implement non-pharmacological measures as appropriate and evaluate response  - Consider cultural and social influences on pain and pain management  - Notify physician/advanced practitioner if interventions unsuccessful or patient reports new pain  7/20/2023 1549 by Ashtyn Wade RN  Outcome: Completed  7/20/2023 1441 by Ashtyn Wade RN  Outcome: Adequate for Discharge     Problem: INFECTION - ADULT  Goal: Absence or prevention of progression during hospitalization  Description: INTERVENTIONS:  - Assess and monitor for signs and symptoms of infection  - Monitor lab/diagnostic results  - Monitor all insertion sites, i.e. indwelling lines, tubes, and drains  - Monitor endotracheal if appropriate and nasal secretions for changes in amount and color  - [place] appropriate cooling/warming therapies per order  - Administer medications as ordered  - Instruct and encourage patient and family to use good hand hygiene technique  - Identify and instruct in appropriate isolation precautions for identified infection/condition  7/20/2023 1549 by Ashtyn Wade RN  Outcome: Completed  7/20/2023 1441 by Ashtyn Wade RN  Outcome: Adequate for Discharge  Goal: Absence of fever/infection during neutropenic period  Description: INTERVENTIONS:  - Monitor WBC    7/20/2023 1549 by Ashtyn Wade RN  Outcome: Completed  7/20/2023 1441 by Ashtyn Wade RN  Outcome: Adequate for Discharge     Problem: SAFETY ADULT  Goal: Maintain or return to baseline ADL function  Description: INTERVENTIONS:  -  Assess patient's ability to carry out ADLs; assess patient's baseline for ADL function and identify physical deficits which impact ability to perform ADLs (bathing, care of mouth/teeth, toileting, grooming, dressing, etc.)  - Assess/evaluate cause of self-care deficits   - Assess range of motion  - Assess patient's mobility; develop plan if impaired  - Assess patient's need for assistive devices and provide as appropriate  - Encourage maximum independence but intervene and supervise when necessary  - Involve family in performance of ADLs  - Assess for home care needs following discharge   - Consider OT consult to assist with ADL evaluation and planning for discharge  - Provide patient education as appropriate  7/20/2023 1549 by Ulises Chaves RN  Outcome: Completed  7/20/2023 1441 by Ulises Chaves RN  Outcome: Adequate for Discharge  Goal: Maintains/Returns to pre admission functional level  Description: INTERVENTIONS:  - Perform BMAT or MOVE assessment daily.   - Set and communicate daily mobility goal to care team and patient/family/caregiver. - Collaborate with rehabilitation services on mobility goals if consulted  - Perform Range of Motion  times a day. - Reposition patient every  hours.   - Dangle patient  times a day  - Stand patient  times a day  - Ambulate patient  times a day  - Out of bed to chair  times a day   - Out of bed for meals  times a day  - Out of bed for toileting  - Record patient progress and toleration of activity level   7/20/2023 1549 by Ulises Chaves RN  Outcome: Completed  7/20/2023 1441 by Ulises Chaves RN  Outcome: Adequate for Discharge  Goal: Patient will remain free of falls  Description: INTERVENTIONS:  - Educate patient/family on patient safety including physical limitations  - Instruct patient to call for assistance with activity   - Consult OT/PT to assist with strengthening/mobility   - Keep Call bell within reach  - Keep bed low and locked with side rails adjusted as appropriate  - Keep care items and personal belongings within reach  - Initiate and maintain comfort rounds  - Make Fall Risk Sign visible to staff  - Offer Toileting every  Hours, in advance of need  - Initiate/Maintain alarm  - Obtain necessary fall risk management equipment  - Apply yellow socks and bracelet for high fall risk patients  - Consider moving patient to room near nurses station  7/20/2023 1549 by Jacqueline Keyes RN  Outcome: Completed  7/20/2023 1441 by Jacqueline Keyes RN  Outcome: Adequate for Discharge     Problem: DISCHARGE PLANNING  Goal: Discharge to home or other facility with appropriate resources  Description: INTERVENTIONS:  - Identify barriers to discharge w/patient and caregiver  - Arrange for needed discharge resources and transportation as appropriate  - Identify discharge learning needs (meds, wound care, etc.)  - Arrange for interpretive services to assist at discharge as needed  - Refer to Case Management Department for coordinating discharge planning if the patient needs post-hospital services based on physician/advanced practitioner order or complex needs related to functional status, cognitive ability, or social support system  7/20/2023 1549 by Jacqueline Keyes RN  Outcome: Completed  7/20/2023 1441 by Jacqueline Keyes RN  Outcome: Adequate for Discharge     Problem: Knowledge Deficit  Goal: Patient/family/caregiver demonstrates understanding of disease process, treatment plan, medications, and discharge instructions  Description: Complete learning assessment and assess knowledge base.   Interventions:  - Provide teaching at level of understanding  - Provide teaching via preferred learning methods  7/20/2023 1549 by Jacqueline Keyes RN  Outcome: Completed  7/20/2023 1441 by Jacqueline Keyes RN  Outcome: Adequate for Discharge

## 2023-07-20 NOTE — CASE MANAGEMENT
[facility] has received approved authorization from insurance: [place] ([employer])     Called in by Mario Mata P# [phone]  Authorization received for: Acute Rehab  Facility: [facility]   Authorization #: [id number]  Start of Care: 07/20/2023  Next Review Date: 07/26/2023  Submit next review to: Fax.  [phone]  Care Manager notified: Kim Mcmahon

## 2023-07-20 NOTE — PLAN OF CARE
Problem: MOBILITY - ADULT  Goal: Maintain or return to baseline ADL function  Description: INTERVENTIONS:  -  Assess patient's ability to carry out ADLs; assess patient's baseline for ADL function and identify physical deficits which impact ability to perform ADLs (bathing, care of mouth/teeth, toileting, grooming, dressing, etc.)  - Assess/evaluate cause of self-care deficits   - Assess range of motion  - Assess patient's mobility; develop plan if impaired  - Assess patient's need for assistive devices and provide as appropriate  - Encourage maximum independence but intervene and supervise when necessary  - Involve family in performance of ADLs  - Assess for home care needs following discharge   - Consider OT consult to assist with ADL evaluation and planning for discharge  - Provide patient education as appropriate  Outcome: Adequate for Discharge  Goal: Maintains/Returns to pre admission functional level  Description: INTERVENTIONS:  - Perform BMAT or MOVE assessment daily.   - Set and communicate daily mobility goal to care team and patient/family/caregiver. - Collaborate with rehabilitation services on mobility goals if consulted  - Perform Range of Motion  times a day. - Reposition patient every  hours.   - Dangle patient  times a day  - Stand patient  times a day  - Ambulate patient  times a day  - Out of bed to chair  times a day   - Out of bed for meals  times a day  - Out of bed for toileting  - Record patient progress and toleration of activity level   Outcome: Adequate for Discharge     Problem: PAIN - ADULT  Goal: Verbalizes/displays adequate comfort level or baseline comfort level  Description: Interventions:  - Encourage patient to monitor pain and request assistance  - Assess pain using appropriate pain scale  - Administer analgesics based on type and severity of pain and evaluate response  - Implement non-pharmacological measures as appropriate and evaluate response  - Consider cultural and social influences on pain and pain management  - Notify physician/advanced practitioner if interventions unsuccessful or patient reports new pain  Outcome: Adequate for Discharge     Problem: INFECTION - ADULT  Goal: Absence or prevention of progression during hospitalization  Description: INTERVENTIONS:  - Assess and monitor for signs and symptoms of infection  - Monitor lab/diagnostic results  - Monitor all insertion sites, i.e. indwelling lines, tubes, and drains  - Monitor endotracheal if appropriate and nasal secretions for changes in amount and color  - [place] appropriate cooling/warming therapies per order  - Administer medications as ordered  - Instruct and encourage patient and family to use good hand hygiene technique  - Identify and instruct in appropriate isolation precautions for identified infection/condition  Outcome: Adequate for Discharge  Goal: Absence of fever/infection during neutropenic period  Description: INTERVENTIONS:  - Monitor WBC    Outcome: Adequate for Discharge     Problem: SAFETY ADULT  Goal: Maintain or return to baseline ADL function  Description: INTERVENTIONS:  -  Assess patient's ability to carry out ADLs; assess patient's baseline for ADL function and identify physical deficits which impact ability to perform ADLs (bathing, care of mouth/teeth, toileting, grooming, dressing, etc.)  - Assess/evaluate cause of self-care deficits   - Assess range of motion  - Assess patient's mobility; develop plan if impaired  - Assess patient's need for assistive devices and provide as appropriate  - Encourage maximum independence but intervene and supervise when necessary  - Involve family in performance of ADLs  - Assess for home care needs following discharge   - Consider OT consult to assist with ADL evaluation and planning for discharge  - Provide patient education as appropriate  Outcome: Adequate for Discharge  Goal: Maintains/Returns to pre admission functional level  Description: INTERVENTIONS:  - Perform BMAT or MOVE assessment daily.   - Set and communicate daily mobility goal to care team and patient/family/caregiver. - Collaborate with rehabilitation services on mobility goals if consulted  - Perform Range of Motion  times a day. - Reposition patient every  hours.   - Dangle patient  times a day  - Stand patient  times a day  - Ambulate patient  times a day  - Out of bed to chair  times a day   - Out of bed for meals  times a day  - Out of bed for toileting  - Record patient progress and toleration of activity level   Outcome: Adequate for Discharge  Goal: Patient will remain free of falls  Description: INTERVENTIONS:  - Educate patient/family on patient safety including physical limitations  - Instruct patient to call for assistance with activity   - Consult OT/PT to assist with strengthening/mobility   - Keep Call bell within reach  - Keep bed low and locked with side rails adjusted as appropriate  - Keep care items and personal belongings within reach  - Initiate and maintain comfort rounds  - Make Fall Risk Sign visible to staff  - Offer Toileting every  Hours, in advance of need  - Initiate/Maintain alarm  - Obtain necessary fall risk management equipment  - Apply yellow socks and bracelet for high fall risk patients  - Consider moving patient to room near nurses station  Outcome: Adequate for Discharge     Problem: DISCHARGE PLANNING  Goal: Discharge to home or other facility with appropriate resources  Description: INTERVENTIONS:  - Identify barriers to discharge w/patient and caregiver  - Arrange for needed discharge resources and transportation as appropriate  - Identify discharge learning needs (meds, wound care, etc.)  - Arrange for interpretive services to assist at discharge as needed  - Refer to Case Management Department for coordinating discharge planning if the patient needs post-hospital services based on physician/advanced practitioner order or complex needs related to functional status, cognitive ability, or social support system  Outcome: Adequate for Discharge     Problem: Knowledge Deficit  Goal: Patient/family/caregiver demonstrates understanding of disease process, treatment plan, medications, and discharge instructions  Description: Complete learning assessment and assess knowledge base.   Interventions:  - Provide teaching at level of understanding  - Provide teaching via preferred learning methods  Outcome: Adequate for Discharge

## 2023-07-21 NOTE — UTILIZATION REVIEW
NOTIFICATION OF ADMISSION DISCHARGE   This is a Notification of Discharge from [address] [place] [address]. Please be advised that this patient has been discharge from our facility. Below you will find the admission and discharge date and time including the patient’s disposition. UTILIZATION REVIEW CONTACT:  Sammi Lo MA  Utilization   Network Utilization Review Department  Phone: [phone] x carefully listen to the prompts. All voicemails are confidential.  Email: [email or web address]     ADMISSION INFORMATION  PRESENTATION DATE: 7/14/2023  1:43 PM  OBERVATION ADMISSION DATE:   INPATIENT ADMISSION DATE: 7/14/23  8:18 PM   DISCHARGE DATE: 7/20/2023  3:30 PM   DISPOSITION:Non [facility] Acute Rehab    IMPORTANT INFORMATION:  Send all requests for admission clinical reviews, approved or denied determinations and any other requests to dedicated fax number below belonging to the campus where the patient is receiving treatment.  List of dedicated fax numbers:  [place] DENIALS (Administrative/Medical Necessity) [phone] [address] [facility] (Maternity/NICU/Pediatrics) [phone]   [facility] [phone]   [facility] [phone] 16[facility] [phone] [address] [facility] [phone]   [facility] [phone]   [address] [place] [address] [facility] [phone] 506 [facility] [phone]   [address] [facility] [phone] 2720 [facility] 3000 32[facility] [phone]

## 2023-07-27 ENCOUNTER — TELEPHONE (OUTPATIENT)
Dept: OBGYN CLINIC | Facility: HOSPITAL | Age: 68
End: 2023-07-27

## 2023-07-27 DIAGNOSIS — S72.115A CLOSED NONDISPLACED FRACTURE OF GREATER TROCHANTER OF LEFT FEMUR, INITIAL ENCOUNTER (HCC): ICD-10-CM

## 2023-07-27 DIAGNOSIS — S72.145A NONDISPLACED INTERTROCHANTERIC FRACTURE OF LEFT FEMUR, INITIAL ENCOUNTER FOR CLOSED FRACTURE (HCC): Primary | ICD-10-CM

## 2023-07-27 NOTE — TELEPHONE ENCOUNTER
Called number provided below to speak w/Stephanie.  [phone] is [facility]. There is a number missing for the extension (should be 5 numbers). Person I did get in touch with could not find an [employer]. So, could not relay msg. May need to fax letter re: info. Thanks.

## 2023-07-27 NOTE — TELEPHONE ENCOUNTER
Caller: Silvina Dunbar    Doctor: Claudetta Carmine    Reason for call: Can physical therapy and occupational therapy orders be faxed to them. Orders from 7/20/23 do not mention a DX code, body part or frequency/duration.     Can they be faxed to [phone]    Patient is also complaining of left wrist pain from fall, can she get a an occupational order as well or does she need to be seen for this issue first? ED note does not mention a wrist injury    Call back#: [phone] ex [phone]

## 2023-07-27 NOTE — TELEPHONE ENCOUNTER
Physical therapy and Occupational Therapy orders including a diagnosis code which includes the verbal text description of the code of the injured body part as well as the frequency and duration of the physical and Occupational Therapy are all included in today's PT and OT orders. We are treating her for nondisplaced suspected left radial styloid fracture. We have her in a thumb spica brace on the left wrist.  She was in this in the hospital and still should have this on. Our plan was to keep this on her and repeat x-rays when she comes to see us in the office.   Therefore, no Occupational Therapy order is necessary for the left wrist at this time as we are just immobilizing it we just encouraged her to move the fingers as tolerated and make sure she is ranging the elbow and shoulder on that side

## 2023-07-31 ENCOUNTER — OFFICE VISIT (OUTPATIENT)
Dept: OBGYN CLINIC | Facility: CLINIC | Age: 68
End: 2023-07-31

## 2023-07-31 ENCOUNTER — APPOINTMENT (OUTPATIENT)
Dept: RADIOLOGY | Facility: AMBULARY SURGERY CENTER | Age: 68
End: 2023-07-31
Attending: ORTHOPAEDIC SURGERY
Payer: COMMERCIAL

## 2023-07-31 VITALS — HEIGHT: 62 IN | WEIGHT: 106 LBS | BODY MASS INDEX: 19.51 KG/M2

## 2023-07-31 DIAGNOSIS — M25.532 LEFT WRIST PAIN: ICD-10-CM

## 2023-07-31 DIAGNOSIS — S72.145D NONDISPLACED INTERTROCHANTERIC FRACTURE OF LEFT FEMUR, SUBSEQUENT ENCOUNTER FOR CLOSED FRACTURE WITH ROUTINE HEALING: Primary | ICD-10-CM

## 2023-07-31 DIAGNOSIS — S52.515D NONDISPLACED FRACTURE OF LEFT RADIAL STYLOID PROCESS, SUBSEQUENT ENCOUNTER FOR CLOSED FRACTURE WITH ROUTINE HEALING: ICD-10-CM

## 2023-07-31 DIAGNOSIS — Z96.642 STATUS POST LEFT HIP REPLACEMENT: ICD-10-CM

## 2023-07-31 PROCEDURE — 99024 POSTOP FOLLOW-UP VISIT: CPT | Performed by: ORTHOPAEDIC SURGERY

## 2023-07-31 PROCEDURE — 73502 X-RAY EXAM HIP UNI 2-3 VIEWS: CPT

## 2023-07-31 PROCEDURE — 73110 X-RAY EXAM OF WRIST: CPT

## 2023-07-31 RX ORDER — TIOTROPIUM BROMIDE INHALATION SPRAY 3.12 UG/1
SPRAY, METERED RESPIRATORY (INHALATION)
COMMUNITY
Start: 2023-07-28

## 2023-07-31 RX ORDER — AZITHROMYCIN 250 MG/1
TABLET, FILM COATED ORAL
COMMUNITY
Start: 2023-05-22

## 2023-07-31 RX ORDER — METHYLPREDNISOLONE 4 MG/1
TABLET ORAL
COMMUNITY
Start: 2023-05-22

## 2023-07-31 RX ORDER — FLUTICASONE PROPIONATE AND SALMETEROL 50; 500 UG/1; UG/1
POWDER RESPIRATORY (INHALATION)
COMMUNITY
Start: 2023-07-26

## 2023-07-31 RX ORDER — BUSPIRONE HYDROCHLORIDE 15 MG/1
15 TABLET ORAL 2 TIMES DAILY
COMMUNITY
Start: 2023-06-14

## 2023-07-31 RX ORDER — ROSUVASTATIN CALCIUM 10 MG/1
10 TABLET, COATED ORAL DAILY
COMMUNITY
Start: 2023-06-14

## 2023-07-31 NOTE — LETTER
July 31, 2023     Patient: Naa Herrera  YOB: 1955  Date of Visit: 7/31/2023      To Whom it May Concern:    Naa Herrera is under my professional care. Alf Cornelius was seen in my office on 7/31/2023. Alf Cornelius will remain out of work for at least the next 3 weeks until her next follow-up visit on or around approximately 8/21/2023. If you have any questions or concerns, please don't hesitate to call.          Sincerely,          Derian Colin, DO        CC: No Recipients

## 2023-07-31 NOTE — PROGRESS NOTES
Assessment:   Status Post  Insertion Nail Im Femur Antegrade (trochanteric) - Left on 7/15/2023 doing well    Plan:   Weight bearing: As tolerated left lower extremity nonweightbearing left upper extremity  maintain the thumb spica brace  Pain control as needed  PT  DVT prophylaxis: Lovenox for 30 days post op      Follow Up:  3  week(s)    To Do Next Visit:  X-rays of the  left  wrist and hip   Reevaluate work status  We will look to add in occupational therapy or hand therapy for her left wrist feeling appropriately and get her ranging the left wrist at that time      CHIEF COMPLAINT:  Chief Complaint   Patient presents with   • Left Hip - Post-op         SUBJECTIVE:  Brandy Barriga is a 74y.o. year old female who presents for follow up after Insertion Nail Im Femur Antegrade (trochanteric) - Left. Today patient has some mild discomfort in the posterior hip when sitting down otherwise she is doing well in regards to left lower extremity. She is using the brace for the left foot drop. She is wearing the radiostyloid brace and left wrist.. Pt denies any fevers or chills. Denies any numbness or tingling. PHYSICAL EXAMINATION:    MUSCULOSKELETAL EXAMINATION:   General: Alert and oriented x 3, WNWD, NAD  Incision: Clean, dry, intact, healing well  No gagandeep-incisional erythema  no drainage or purulence  No fluctuance or swelling   Range of Motion: As expected  Neurovascular status: Sensation intact to light touch L1-S1   Motor intact L1-S1 with the exception of foot drop present on the left side which was present prior to surgery  Pulses: Intact  sTables removed Steri-Strips applied    Left wrist:  Ecchymosis intact over the dorsal and volar wrist.  Tenderness palpation of radial styloid. Able to make a full composite fist.  Sensation motor grossly intact median radial ulnar nerve distributions. 2+ radial pulse.       STUDIES REVIEWED:  I have personally reviewed pertinent films in PACS and my interpretation is X-ray of the left wrist demonstrates healing nondisplaced radial styloid fracture. Some callus summation seen. X-ray of the left hip demonstrates stable alignment of intramedullary nail and helical blade fixing and nondisplaced intertrochanteric fracture.       PROCEDURES PERFORMED:  Procedures  No Procedures performed today

## 2023-08-11 ENCOUNTER — TELEPHONE (OUTPATIENT)
Dept: NEUROLOGY | Facility: CLINIC | Age: 68
End: 2023-08-11

## 2023-08-11 NOTE — TELEPHONE ENCOUNTER
1ST ATTEMPT - Called [facility] at [phone]. Line rang and rang until call disconnected. Neurology was asked to review the case, but not consulted. This would be a NEW PATIENT appt for LLE weakness. Please schedule if they call in. Will follow up. NOTES:  Gabby Garcia will need follow up in in 6 weeks with neuromuscular attending/AP. She will require a EMG/NCS within 6 weeks.

## 2023-08-21 ENCOUNTER — APPOINTMENT (OUTPATIENT)
Dept: RADIOLOGY | Facility: AMBULARY SURGERY CENTER | Age: 68
End: 2023-08-21
Attending: ORTHOPAEDIC SURGERY
Payer: COMMERCIAL

## 2023-08-21 ENCOUNTER — OFFICE VISIT (OUTPATIENT)
Dept: OBGYN CLINIC | Facility: CLINIC | Age: 68
End: 2023-08-21

## 2023-08-21 VITALS — WEIGHT: 106 LBS | BODY MASS INDEX: 19.51 KG/M2 | HEIGHT: 62 IN

## 2023-08-21 DIAGNOSIS — S52.515D NONDISPLACED FRACTURE OF LEFT RADIAL STYLOID PROCESS, SUBSEQUENT ENCOUNTER FOR CLOSED FRACTURE WITH ROUTINE HEALING: ICD-10-CM

## 2023-08-21 DIAGNOSIS — S72.145D NONDISPLACED INTERTROCHANTERIC FRACTURE OF LEFT FEMUR, SUBSEQUENT ENCOUNTER FOR CLOSED FRACTURE WITH ROUTINE HEALING: ICD-10-CM

## 2023-08-21 DIAGNOSIS — S52.515D NONDISPLACED FRACTURE OF LEFT RADIAL STYLOID PROCESS, SUBSEQUENT ENCOUNTER FOR CLOSED FRACTURE WITH ROUTINE HEALING: Primary | ICD-10-CM

## 2023-08-21 PROCEDURE — 99024 POSTOP FOLLOW-UP VISIT: CPT | Performed by: ORTHOPAEDIC SURGERY

## 2023-08-21 PROCEDURE — 73502 X-RAY EXAM HIP UNI 2-3 VIEWS: CPT

## 2023-08-21 PROCEDURE — 73110 X-RAY EXAM OF WRIST: CPT

## 2023-08-21 RX ORDER — ALBUTEROL SULFATE 90 UG/1
AEROSOL, METERED RESPIRATORY (INHALATION)
COMMUNITY
Start: 2023-08-01

## 2023-08-21 NOTE — PROGRESS NOTES
Assessment:   Status Post  Insertion Nail Im Femur Antegrade (trochanteric) - Left on 7/15/2023 doing wel    Plan:   Weight bearing: As tolerated left lower extremity   nonweightbearing left upper extremity   May begin weaning out of the thumb spica brace over the next week and begin range of motion with hand therapy, advised patient that she can wear the brace still at nighttime also if she is out and about and she feels more comfortable with the brace she can wear but overall want her to be out of the brace and moving the wrist  Pain control as needed  PT  Recommend that she keep her appointment with the physical medicine and rehabilitation specialist tomorrow        Follow Up:  5  week(s)    To Do Next Visit:  X-rays of the  left  wrist and hip   Reevaluate work status  She will remain out of work for now      CHIEF COMPLAINT:  Chief Complaint   Patient presents with   • Left Wrist - Follow-up   • Left Hip - Follow-up         SUBJECTIVE:  Clau Hernandez is a 79y.o. year old female who presents for follow up after Insertion Nail Im Femur Antegrade (trochanteric) - Left. Today patient has some pain in the gluteal region. She reports pain is worse with sitting down. Pain is slightly improved since last visit. She still has some pain over the distal radius. No numbness or tingling. PHYSICAL EXAMINATION:    MUSCULOSKELETAL EXAMINATION:   General: Alert and oriented x 3, WNWD, NAD  Incision: Well-healed, 2 suture tails were removed from the distal incision sterile bandage applied incision cleansed with rubbing alcohol  Range of Motion: As expected  Neurovascular status: Sensation intact to light touch L1-S1   Motor intact L1-S1 with the exception of foot drop present on the left side which was present prior to surgery  Pulses: Intact      Left wrist:  No ecchymosis, skin intact over the dorsal and volar wrist.  Tenderness palpation of radial styloid.   Able to make a full composite fist.  Sensation motor grossly intact median radial ulnar nerve distributions. 2+ radial pulse. STUDIES REVIEWED:  I have personally reviewed pertinent films in PACS and my interpretation is X-ray of the left wrist demonstrates progressive sclerosis and callus along the nondisplaced fracture line of the distal radius that extends from the ulnar cortex to the radial styloid. There is not yet complete bridging at the ulnar cortex but near complete. X-ray of the left hip demonstrates no complication of hardware status post insertion of cephalomedullary nail. Fracture line is not visible in the intertrochanteric region.     PROCEDURES PERFORMED:  Procedures  No Procedures performed today

## 2023-08-21 NOTE — LETTER
August 21, 2023     Patient: Katy Sandoval  YOB: 1955  Date of Visit: 8/21/2023      To Whom it May Concern:    Katy Sandoval is under my professional care. Unruly Puckett was seen in my office on 8/21/2023. Unruly Puckett Is to remain out of work until next follow up visit approximately 5 weeks from today's date. If you have any questions or concerns, please don't hesitate to call.          Sincerely,          Patrick Maddox DO        CC: No Recipients

## 2023-08-28 ENCOUNTER — TELEPHONE (OUTPATIENT)
Dept: NEUROLOGY | Facility: CLINIC | Age: 68
End: 2023-08-28

## 2023-08-28 ENCOUNTER — HOSPITAL ENCOUNTER (OUTPATIENT)
Dept: RADIOLOGY | Facility: MEDICAL CENTER | Age: 68
Discharge: HOME | End: 2023-08-28
Payer: COMMERCIAL

## 2023-08-28 DIAGNOSIS — M21.372 LEFT FOOT DROP: ICD-10-CM

## 2023-08-28 PROCEDURE — 70450 CT HEAD/BRAIN W/O DYE: CPT

## 2023-08-28 PROCEDURE — G1004 CDSM NDSC: HCPCS

## 2023-08-29 ENCOUNTER — OFFICE VISIT (OUTPATIENT)
Dept: NEUROLOGY | Facility: CLINIC | Age: 68
End: 2023-08-29
Payer: COMMERCIAL

## 2023-08-29 VITALS
WEIGHT: 106 LBS | HEIGHT: 62 IN | SYSTOLIC BLOOD PRESSURE: 170 MMHG | DIASTOLIC BLOOD PRESSURE: 90 MMHG | BODY MASS INDEX: 19.51 KG/M2

## 2023-08-29 DIAGNOSIS — M62.81 GENERALIZED MUSCLE WEAKNESS: Primary | ICD-10-CM

## 2023-08-29 PROCEDURE — 99204 OFFICE O/P NEW MOD 45 MIN: CPT | Performed by: PSYCHIATRY & NEUROLOGY

## 2023-08-29 NOTE — PROGRESS NOTES
I had the pleasure of seeing your patient in neurology clinic for neuromuscular consultation. As you know, patient is a 61-year-old woman, who was referred for evaluation for muscle weakness. Please allow me to summarize her history for the record. Patient was hospitalized in July 2023, for a fall, had a nondisplaced fracture of the left femur (greater trochanter). MRI Left hip:  7/14/23: Left greater trochanteric fracture seen on prior study does also extend incompletely through the intertrochanteric region, reaching two thirds of the way to the lesser trochanteric cortex . MRI L spine, 7/14/23: Images were personally reviewed by me as a part of this evaluation today, mild degenerative changes with facet arthropathy, minimal canal stenosis and foraminal narrowing noted at L3-4, minimal degenerative changes at L4-5, without any significant canal stenosis or neuroforaminal narrowing, no abnormal enhancement. Labs:8/1/2359Jxl2m 6.1.  CBCB normal.

## 2023-08-29 NOTE — PROGRESS NOTES
Patient ID: Gay Sandoval is a 79 y.o. female. Assessment/Plan:    Generalized muscle weakness  This patient has had progressive muscle weakness, symptoms according to the patient started in May 2023, however may have been going on for a while. She had a fall in May, when the left knee gave out, and soon after noted weakness in the left foot which has been progressive since. Over the last few weeks, has noted weakness in the right lower extremity as well, which was evident on my exam today. She has not had any pain or paresthesias, or any other sensory symptoms. Work-up thus far has included MRI of the lumbar spine which does not show any clear structural etiology to explain her progressive symptoms. My exam did show fasciculations in both upper extremities, weakness in both lower extremities, with brisk reflexes in the upper extremities, no William signs, and preserved weakness in lower extremities. These findings along with painless progressive weakness most certainly raise the concern for motor neuron disease. This would be further supported by electrodiagnostic testing as has been performed by Dr. Violet Ventura at [address] [place] [address]. At this time, she needs further evaluation to rule out any other inflammatory/structural etiology, recommended MRI of the brain and cervical spine, in addition blood work as noted below including paraneoplastic panel. If the work-up is negative, will discuss the possibility of repeat EMG. At this time, patient is not interested in pursuing the study. I explained to the patient and her  this is a neurogenic process, and we will pursue the above-mentioned work-up. I will stay in touch with them as we get the results of above-mentioned testing, and will schedule a return visit for them pending her imaging studies. In the meantime, she will continue physical therapy as she has been doing.        Diagnoses and all orders for this visit:    Generalized muscle weakness  - Sedimentation rate, automated; Future  -     TSH, 3rd generation with Free T4 reflex; Future  -     Vitamin B12; Future  -     SINAN Screen w/ Reflex to Titer/Pattern; Future  -     Protein electrophoresis, serum; Future  -     MISCELLANEOUS LAB TEST; Future  -     MRI brain with and without contrast; Future  -     MRI cervical spine with and without contrast; Future  -     Comprehensive metabolic panel; Future  -     CK; Future           Subjective:    HPI  I had the pleasure of seeing your patient in neurology clinic for neuromuscular consultation. As you know, patient is a 60-year-old woman, who was referred for evaluation for muscle weakness. Please allow me to summarize her history for the record. Patient reports all her symptoms started in May 2023, she was at a baseball game, She was walking down the hill towards the car, her knee gave out and she fell down. She does not recall if she injured anything, does not think she had the foot drop right away. She recalls having a bad muscle cramp in the left calf the night before. In the next week or two, she noted she couldn't lift the left foot against gravity. She feels the tightness in the left calf stayed for a while. In the chart, there is mention that this weakness may have started about a year ago, however patient and her  were quite clear that symptoms did not start up until May 2023. She continued to work, did not have any pain or numbness/paresthesias,   She is currently pursuing PT,  feels it may have gotten worse that led to the accident at work in July 2023. She was standing outside, sirens went off, she got startled, left knee gave out and she fell, fractured her left hip. In the last few weeks, she has noted tightness in the right calf, no weakness in the right foot. Has not noted any other muscle cramps. Reports muscle twitches in the arms, which is long standing.    Has been using the brace for the left foot since July, ambulating with a Walker and doing PT. No weakness in the arms. .   No neck or back pain. Has COPD, gets out of breath with exertion sometimes, no SOB at rest.   No change in speech or swallowing. NO change in weight recently. Other medical problems: Only COPD and anxiety. Blood pressure was coty today, gets that way when she foes to doctors. Works for [employer]", has to stand for 7-8 hours a day. Patient was hospitalized in July 2023, for a fall, had a nondisplaced fracture of the left femur (greater trochanter). She was a foster child since she was 17 months old, does not know much about her family hx. No alcvohol use,w as a smoker for 30 yearsm quit recently. MRI Left hip:  7/14/23: Left greater trochanteric fracture seen on prior study does also extend incompletely through the intertrochanteric region, reaching two thirds of the way to the lesser trochanteric cortex . MRI L spine, 7/14/23: Images were personally reviewed by me as a part of this evaluation today, mild degenerative changes with facet arthropathy, minimal canal stenosis and foraminal narrowing noted at L3-4, minimal degenerative changes at L4-5, without any significant canal stenosis or neuroforaminal narrowing, no abnormal enhancement. Labs:8/1/2386Oat2n 6.1. CBC normal.     CT head August 28, 2023, images personally reviewed by me as a part of this evaluation with patient and her , no acute intracranial abnormality.   Mild hypodense structure lateral to the right anterior temporal horn was reported, likely a prominent perivascular space, or possible neuroglial cyst.    EMG at [facility] last week (aug 2023), raw data personally viewed by me as a part of this evaluation today, Study was performed of right upper and both lower extremities, all the sensory responses were normal, with normal latencies, amplitudes and conduction velocities including right median, ulnar, radial, bilateral sural and superficial peroneal sensory responses. Left peroneal motor response was absent, right peroneal motor response from extensor digitorum brevis muscle showed markedly reduced amplitude with diffusely slow conduction velocity, however further focal slowing of conduction velocity was noted across the fibular head. Right median motor study showed low amplitude with normal latency and conduction velocity. Left tibial motor response was absent. Right tibial motor response was reduced with slightly prolonged latency and normal conduction velocity. Right ulnar motor study showed reduced amplitude with normal latency and normal conduction velocity. Right median and ulnar F responses were normal, H waves were slightly prolonged however symmetric bilaterally. Needle examination showed fibrillation potentials and positive sharp waves in almost all the tested bilateral lower extremity muscles, with reduced recruitment of Motor unit action potentials potentials that were of normal to increased amplitude and duration, in addition spontaneous activity was noted in the right FDI, pronator, biceps and triceps with fasciculation potentials with chronic neurogenic Motor unit action potentials action potentials. Right masseter, temporalis and orbicularis oculi muscles were normal, however thoracic paraspinal muscles reported abnormal spontaneous activity. 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 The following portions of the patient's history were reviewed and updated as appropriate: allergies, current medications, past family history, past medical history, past social history, past surgical history and problem list.         Objective:    Blood pressure 170/90, height 5' 2" (1.575 m), weight 48.1 kg (106 lb). Physical Exam   General examination, patient was not in any acute distress. HEENT was unremarkable.   Extremities did not reveal any edema, however patient was wearing a AFO on the left and was using a walker for ambulation. Neurological Exam  Neurologically, patient was awake and alert. Speech was fluent without any dysarthria or aphasia. Cranial examination did not reveal any ptosis at baseline, extraocular movements were normal, without any nystagmus, or diplopia. I did not appreciate any weakness of eye closure muscles. No facial asymmetry was noted, facial sensations were normal bilaterally, no facial weakness, patient was able to puff out her cheeks well and push her tongue into her cheeks well. Uvula and palate elevated symmetrically. On motor examination, decreased bulk was noted in the lower leg muscles, especially on the left compared to the right, diffuse fasciculations were noted in both upper extremities in the bicep/tricep region, as well as in the forearms, in addition fasciculations were noted in the quadriceps muscles. Strength testing revealed full strength in neck flexors, extensors, and all muscle groups in both upper extremities. In the lower extremities, strength was graded as follows (right/left): Hip flexors 4+/4, knee extensors 5/4 -, knee flexors 5/4 -, ankle dorsiflexors 3/0, plantar flexors 3+/0, everters 3 -/0, inverters 3/1. Reflexes were 3 in the upper extremities, knees were 2, right ankle was 1, left was absent. Toes were downgoing bilaterally. Sensation was intact to pinprick, temperature, vibration and proprioception without any side-to-side asymmetry. Ambulated with the help of a walker, with steppage on the left due to foot drop. ROS:  I reviewed the below ROS and what is mentioned in HPI, the remainder of ROS was negative. Review of Systems   Constitutional: Negative for appetite change, fatigue and fever. HENT: Negative. Negative for hearing loss, tinnitus, trouble swallowing and voice change. Eyes: Negative. Negative for photophobia, pain and visual disturbance. Respiratory: Negative.   Negative for shortness of breath. Cardiovascular: Negative. Negative for palpitations. Gastrointestinal: Negative. Negative for nausea and vomiting. Endocrine: Negative. Negative for cold intolerance. Genitourinary: Negative. Negative for dysuria, frequency and urgency. Musculoskeletal: Positive for gait problem (left drop foot). Negative for back pain, myalgias and neck pain. Skin: Negative. Negative for rash. Allergic/Immunologic: Negative. Neurological: Positive for weakness (in left leg). Negative for dizziness, tremors, seizures, syncope, facial asymmetry, speech difficulty, light-headedness, numbness and headaches. Hematological: Negative. Does not bruise/bleed easily. Psychiatric/Behavioral: Negative. Negative for confusion, hallucinations and sleep disturbance. All other systems reviewed and are negative.

## 2023-08-30 NOTE — ASSESSMENT & PLAN NOTE
This patient has had progressive muscle weakness, symptoms according to the patient started in May 2023, however may have been going on for a while. She had a fall in May, when the left knee gave out, and soon after noted weakness in the left foot which has been progressive since. Over the last few weeks, has noted weakness in the right lower extremity as well, which was evident on my exam today. She has not had any pain or paresthesias, or any other sensory symptoms. Work-up thus far has included MRI of the lumbar spine which does not show any clear structural etiology to explain her progressive symptoms. My exam did show fasciculations in both upper extremities, weakness in both lower extremities, with brisk reflexes in the upper extremities, no William signs, and preserved weakness in lower extremities. These findings along with painless progressive weakness most certainly raise the concern for motor neuron disease. This would be further supported by electrodiagnostic testing as has been performed by Dr. Rosetta Eng at [facility]. At this time, she needs further evaluation to rule out any other inflammatory/structural etiology, recommended MRI of the brain and cervical spine, in addition blood work as noted below including paraneoplastic panel. If the work-up is negative, will discuss the possibility of repeat EMG. At this time, patient is not interested in pursuing the study. I explained to the patient and her  this is a neurogenic process, and we will pursue the above-mentioned work-up. I will stay in touch with them as we get the results of above-mentioned testing, and will schedule a return visit for them pending her imaging studies. In the meantime, she will continue physical therapy as she has been doing. Doing well  Platelets recovering nicely   Continue Eliquis as planned  Outpatient followup      Kishan [phone]

## 2023-09-05 ENCOUNTER — NURSE TRIAGE (OUTPATIENT)
Age: 68
End: 2023-09-05

## 2023-09-05 ENCOUNTER — HOSPITAL ENCOUNTER (EMERGENCY)
Facility: HOSPITAL | Age: 68
Discharge: HOME | End: 2023-09-05
Attending: EMERGENCY MEDICINE
Payer: COMMERCIAL

## 2023-09-05 ENCOUNTER — APPOINTMENT (EMERGENCY)
Dept: CT IMAGING | Facility: HOSPITAL | Age: 68
End: 2023-09-05
Payer: COMMERCIAL

## 2023-09-05 ENCOUNTER — APPOINTMENT (EMERGENCY)
Dept: RADIOLOGY | Facility: HOSPITAL | Age: 68
End: 2023-09-05
Payer: COMMERCIAL

## 2023-09-05 ENCOUNTER — NURSE TRIAGE (OUTPATIENT)
Dept: OBGYN CLINIC | Facility: HOSPITAL | Age: 68
End: 2023-09-05

## 2023-09-05 VITALS
DIASTOLIC BLOOD PRESSURE: 89 MMHG | TEMPERATURE: 98.2 F | RESPIRATION RATE: 18 BRPM | OXYGEN SATURATION: 91 % | SYSTOLIC BLOOD PRESSURE: 142 MMHG | HEART RATE: 107 BPM

## 2023-09-05 DIAGNOSIS — M25.552 LEFT HIP PAIN: ICD-10-CM

## 2023-09-05 DIAGNOSIS — T14.8XXA HEMATOMA: Primary | ICD-10-CM

## 2023-09-05 LAB
ANION GAP SERPL CALCULATED.3IONS-SCNC: 9 MMOL/L
BASOPHILS # BLD AUTO: 0.05 THOUSANDS/ÂΜL (ref 0–0.1)
BASOPHILS NFR BLD AUTO: 1 % (ref 0–1)
BUN SERPL-MCNC: 25 MG/DL (ref 5–25)
CALCIUM SERPL-MCNC: 9.3 MG/DL (ref 8.4–10.2)
CHLORIDE SERPL-SCNC: 99 MMOL/L (ref 96–108)
CO2 SERPL-SCNC: 30 MMOL/L (ref 21–32)
CREAT SERPL-MCNC: 0.5 MG/DL (ref 0.6–1.3)
EOSINOPHIL # BLD AUTO: 0.23 THOUSAND/ÂΜL (ref 0–0.61)
EOSINOPHIL NFR BLD AUTO: 4 % (ref 0–6)
ERYTHROCYTE [DISTWIDTH] IN BLOOD BY AUTOMATED COUNT: 14.1 % (ref 11.6–15.1)
GFR SERPL CREATININE-BSD FRML MDRD: 101 ML/MIN/1.73SQ M
GLUCOSE SERPL-MCNC: 88 MG/DL (ref 65–140)
HCT VFR BLD AUTO: 37 % (ref 34.8–46.1)
HGB BLD-MCNC: 12.2 G/DL (ref 11.5–15.4)
IMM GRANULOCYTES # BLD AUTO: 0.01 THOUSAND/UL (ref 0–0.2)
IMM GRANULOCYTES NFR BLD AUTO: 0 % (ref 0–2)
LYMPHOCYTES # BLD AUTO: 1.58 THOUSANDS/ÂΜL (ref 0.6–4.47)
LYMPHOCYTES NFR BLD AUTO: 26 % (ref 14–44)
MCH RBC QN AUTO: 30 PG (ref 26.8–34.3)
MCHC RBC AUTO-ENTMCNC: 33 G/DL (ref 31.4–37.4)
MCV RBC AUTO: 91 FL (ref 82–98)
MONOCYTES # BLD AUTO: 0.64 THOUSAND/ÂΜL (ref 0.17–1.22)
MONOCYTES NFR BLD AUTO: 10 % (ref 4–12)
NEUTROPHILS # BLD AUTO: 3.69 THOUSANDS/ÂΜL (ref 1.85–7.62)
NEUTS SEG NFR BLD AUTO: 59 % (ref 43–75)
NRBC BLD AUTO-RTO: 0 /100 WBCS
PLATELET # BLD AUTO: 343 THOUSANDS/UL (ref 149–390)
PMV BLD AUTO: 9.8 FL (ref 8.9–12.7)
POTASSIUM SERPL-SCNC: 3.8 MMOL/L (ref 3.5–5.3)
RBC # BLD AUTO: 4.07 MILLION/UL (ref 3.81–5.12)
SODIUM SERPL-SCNC: 138 MMOL/L (ref 135–147)
WBC # BLD AUTO: 6.2 THOUSAND/UL (ref 4.31–10.16)

## 2023-09-05 PROCEDURE — 93005 ELECTROCARDIOGRAM TRACING: CPT

## 2023-09-05 PROCEDURE — 96361 HYDRATE IV INFUSION ADD-ON: CPT

## 2023-09-05 PROCEDURE — 80048 BASIC METABOLIC PNL TOTAL CA: CPT

## 2023-09-05 PROCEDURE — 36415 COLL VENOUS BLD VENIPUNCTURE: CPT

## 2023-09-05 PROCEDURE — 71275 CT ANGIOGRAPHY CHEST: CPT

## 2023-09-05 PROCEDURE — 73502 X-RAY EXAM HIP UNI 2-3 VIEWS: CPT

## 2023-09-05 PROCEDURE — G1004 CDSM NDSC: HCPCS

## 2023-09-05 PROCEDURE — 99285 EMERGENCY DEPT VISIT HI MDM: CPT | Performed by: EMERGENCY MEDICINE

## 2023-09-05 PROCEDURE — 99284 EMERGENCY DEPT VISIT MOD MDM: CPT

## 2023-09-05 PROCEDURE — 96360 HYDRATION IV INFUSION INIT: CPT

## 2023-09-05 PROCEDURE — 73700 CT LOWER EXTREMITY W/O DYE: CPT

## 2023-09-05 PROCEDURE — 85025 COMPLETE CBC W/AUTO DIFF WBC: CPT

## 2023-09-05 RX ORDER — SODIUM CHLORIDE 9 MG/ML
100 INJECTION, SOLUTION INTRAVENOUS CONTINUOUS
Status: DISCONTINUED | OUTPATIENT
Start: 2023-09-05 | End: 2023-09-05 | Stop reason: HOSPADM

## 2023-09-05 RX ORDER — ACETAMINOPHEN 325 MG/1
975 TABLET ORAL ONCE
Status: COMPLETED | OUTPATIENT
Start: 2023-09-05 | End: 2023-09-05

## 2023-09-05 RX ADMIN — SODIUM CHLORIDE 100 ML/HR: 0.9 INJECTION, SOLUTION INTRAVENOUS at 18:47

## 2023-09-05 RX ADMIN — ACETAMINOPHEN 975 MG: 325 TABLET, FILM COATED ORAL at 18:46

## 2023-09-05 RX ADMIN — IOHEXOL 85 ML: 350 INJECTION, SOLUTION INTRAVENOUS at 17:40

## 2023-09-05 NOTE — ED PROVIDER NOTES
History  Chief Complaint   Patient presents with   • Hip Pain     Pt reports left hip pain upon standing on Saturday and hasnt been able to walk the same since. Hx hip surgery with pins placed 6 weeks ago. Reports bruising to hip. No fall     82-year-old female with history of left femur nail, COPD presents with presents with left leg pain. Patient states that she was getting up from chair with her walker when she felt a cute onset of left hip/femur pain. After incident had inability to bear weight on the left lower extremity. Noted bruising over the site of pain. Denies any popping, clicking, shifting, deformity, abnormal motion. Previously admitted 6+ weeks ago for fall with left femur nail placement. Pain over site of surgery. Recent diagnosis of COPD a couple months ago, quit tobacco use X 2 months, previous heavy smoker. Denies previous history of VTE, hormone use, recent long travel, sensation of palpitations, hemoptysis, leg swelling, lower leg pain, active cancer. Denies numbness, tingling, decreased range of motion, chest pain, shortness of breath, nausea, vomiting, abdominal pain, changes in urinary or bowel habits. Prior to Admission Medications   Prescriptions Last Dose Informant Patient Reported? Taking? Advair Diskus 500-50 MCG/ACT inhaler   Yes No   Spiriva Respimat 2.5 MCG/ACT AERS inhaler   Yes No   albuterol (PROVENTIL HFA,VENTOLIN HFA) 90 mcg/act inhaler   Yes No   Sig: inhale 2 puffs by mouth and INTO THE LUNGS four times a day if needed   azithromycin (ZITHROMAX) 250 mg tablet   Yes No   Sig: take 2 tablets by mouth TODAY then take 1 tablet DAILY FOR 4 DAYS   Patient not taking: Reported on 8/21/2023   budesonide (PULMICORT) 0.5 mg/2 mL nebulizer solution   No No   Sig: Take 2 mL (0.5 mg total) by nebulization 2 (two) times a day Rinse mouth after use.    busPIRone (BUSPAR) 15 mg tablet   Yes No   Sig: Take 15 mg by mouth 2 (two) times a day   Patient not taking: Reported on 8/21/2023   ipratropium (ATROVENT) 0.02 % nebulizer solution   No No   Sig: Take 2.5 mL (0.5 mg total) by nebulization 3 (three) times a day   metFORMIN (GLUCOPHAGE) 500 mg tablet   Yes No   Sig: Take 500 mg by mouth daily Take with food   Patient not taking: Reported on 8/21/2023   methocarbamol (ROBAXIN) 500 mg tablet   No No   Sig: Take 1 tablet (500 mg total) by mouth every 6 (six) hours as needed for muscle spasms for up to 14 days   methylPREDNISolone 4 MG tablet therapy pack   Yes No   Sig: use as directed FOLLOW DIRECTIONS ON BACK OF FOIL PACK   Patient not taking: Reported on 8/21/2023   nicotine (NICODERM CQ) 7 mg/24hr TD 24 hr patch   No No   Sig: Place 1 patch on the skin over 24 hours daily Do not start before July 21, 2023. Patient not taking: Reported on 8/21/2023   rosuvastatin (CRESTOR) 10 MG tablet   Yes No   Sig: Take 10 mg by mouth daily   Patient not taking: Reported on 8/21/2023      Facility-Administered Medications: None       Past Medical History:   Diagnosis Date   • COPD (chronic obstructive pulmonary disease) ([address])        Past Surgical History:   Procedure Laterality Date   • NM OPTX FEM SHFT FX W/INSJ IMED IMPLT W/WO SCREW Left 7/15/2023    Procedure: INSERTION NAIL IM FEMUR ANTEGRADE (TROCHANTERIC); Surgeon: Nancy Barrientos DO;  Location: [facility];  Service: Orthopedics       History reviewed. No pertinent family history. I have reviewed and agree with the history as documented. E-Cigarette/Vaping   • E-Cigarette Use Former User      E-Cigarette/Vaping Substances     Social History     Tobacco Use   • Smoking status: Every Day     Packs/day: 0.50     Types: Cigarettes   • Smokeless tobacco: Never   Vaping Use   • Vaping Use: Former   Substance Use Topics   • Alcohol use: Not Currently   • Drug use: Never        Review of Systems   All other systems reviewed and are negative.       Physical Exam  ED Triage Vitals [09/05/23 1257]   Temperature Pulse Respirations Blood Pressure SpO2 98.2 °F (36.8 °C) (!) 125 18 125/75 93 %      Temp Source Heart Rate Source Patient Position - Orthostatic VS BP Location FiO2 (%)   Oral Monitor Sitting Right arm --      Pain Score       No Pain             Orthostatic Vital Signs  Vitals:    09/05/23 1830 09/05/23 1900 09/05/23 1930 09/05/23 2000   BP: 154/96 127/80 133/98 142/89   Pulse: (!) 107 105 102 (!) 107   Patient Position - Orthostatic VS: Lying Lying Lying        Physical Exam  Vitals and nursing note reviewed. Constitutional:       General: She is not in acute distress. Appearance: Normal appearance. She is normal weight. She is not ill-appearing, toxic-appearing or diaphoretic. HENT:      Head: Normocephalic and atraumatic. Right Ear: External ear normal.      Left Ear: External ear normal.      Nose: Nose normal.      Mouth/Throat:      Mouth: Mucous membranes are moist.      Pharynx: Oropharynx is clear. Eyes:      General: No scleral icterus. Right eye: No discharge. Left eye: No discharge. Extraocular Movements: Extraocular movements intact. Cardiovascular:      Rate and Rhythm: Regular rhythm. Tachycardia present. Pulses: Normal pulses. Heart sounds: Normal heart sounds. No murmur heard. No friction rub. No gallop. Pulmonary:      Effort: Pulmonary effort is normal. No respiratory distress. Breath sounds: Normal breath sounds. No stridor. No wheezing, rhonchi or rales. Chest:      Chest wall: No tenderness. Abdominal:      General: There is no distension. Palpations: Abdomen is soft. There is no mass. Tenderness: There is no abdominal tenderness. There is no guarding or rebound. Hernia: No hernia is present. Musculoskeletal:         General: Tenderness present. No swelling, deformity or signs of injury. Normal range of motion. Cervical back: Neck supple. Right lower leg: No edema. Left lower leg: No edema.         Legs:    Skin:     General: Skin is warm and dry. Capillary Refill: Capillary refill takes less than 2 seconds. Coloration: Skin is not cyanotic, jaundiced or pale. Findings: No bruising, erythema, lesion, petechiae or rash. Neurological:      General: No focal deficit present. Mental Status: She is alert and oriented to person, place, and time. Mental status is at baseline. Sensory: No sensory deficit. Motor: Weakness (LLE) present.       Gait: Gait abnormal.   Psychiatric:         Mood and Affect: Mood normal.         Behavior: Behavior normal.         ED Medications  Medications   sodium chloride 0.9 % infusion (0 mL/hr Intravenous Stopped 9/5/23 2023)   iohexol (OMNIPAQUE) 350 MG/ML injection (MULTI-DOSE) 85 mL (85 mL Intravenous Given 9/5/23 1740)   acetaminophen (TYLENOL) tablet 975 mg (975 mg Oral Given 9/5/23 1846)       Diagnostic Studies  Results Reviewed     Procedure Component Value Units Date/Time    Basic metabolic panel [[id number]]  (Abnormal) Collected: 09/05/23 1611    Lab Status: Final result Specimen: Blood from Arm, Right Updated: 09/05/23 1639     Sodium 138 mmol/L      Potassium 3.8 mmol/L      Chloride 99 mmol/L      CO2 30 mmol/L      ANION GAP 9 mmol/L      BUN 25 mg/dL      Creatinine 0.50 mg/dL      Glucose 88 mg/dL      Calcium 9.3 mg/dL      eGFR 101 ml/min/1.73sq m     Narrative:      [facility] guidelines for Chronic Kidney Disease (CKD):   •  Stage 1 with normal or high GFR (GFR > 90 mL/min/1.73 square meters)  •  Stage 2 Mild CKD (GFR = 60-89 mL/min/1.73 square meters)  •  Stage 3A Moderate CKD (GFR = 45-59 mL/min/1.73 square meters)  •  Stage 3B Moderate CKD (GFR = 30-44 mL/min/1.73 square meters)  •  Stage 4 Severe CKD (GFR = 15-29 mL/min/1.73 square meters)  •  Stage 5 End Stage CKD (GFR <15 mL/min/1.73 square meters)  Note: GFR calculation is accurate only with a steady state creatinine    CBC and differential [[id number]] Collected: 09/05/23 1611    Lab Status: Final result Specimen: Blood from Arm, Right Updated: 09/05/23 1621     WBC 6.20 Thousand/uL      RBC 4.07 Million/uL      Hemoglobin 12.2 g/dL      Hematocrit 37.0 %      MCV 91 fL      MCH 30.0 pg      MCHC 33.0 g/dL      RDW 14.1 %      MPV 9.8 fL      Platelets 600 Thousands/uL      nRBC 0 /100 WBCs      Neutrophils Relative 59 %      Immat GRANS % 0 %      Lymphocytes Relative 26 %      Monocytes Relative 10 %      Eosinophils Relative 4 %      Basophils Relative 1 %      Neutrophils Absolute 3.69 Thousands/µL      Immature Grans Absolute 0.01 Thousand/uL      Lymphocytes Absolute 1.58 Thousands/µL      Monocytes Absolute 0.64 Thousand/µL      Eosinophils Absolute 0.23 Thousand/µL      Basophils Absolute 0.05 Thousands/µL                  CTA ED chest PE Study   Final Result by Torie Ibarra MD (09/05 1904)      No acute findings, no pulmonary arterial embolism or pulmonary infiltrate/consolidation. Workstation performed: [id number]         CT lower extremity wo contrast left   Final Result by Christel Frost MD (09/05 1930)      Healing the left greater trochanteric fracture with stable hardware. 7.5 x 2.8 x 7.4 cm hematoma in the lateral aspect of the left greater trochanter         Workstation performed: [id number]         XR hip/pelv 2-3 vws left   ED Interpretation by Rickey Stiles MD (09/05 1659)   No acute bony abnormalities. No deformities/breaks or shifting of hardware when compared to previous xrays. Procedures  Procedures      ED Course  ED Course as of 09/05/23 2135 Tue Sep 05, 2023   1538 EKG normal sinus rhythm rate of 99, right axis deviation, normal AR interval, normal QRS interval, QTc 444, P pulmonale lead II, Q waves in leads V1 and V2, no previous EKG for comparison. 1745 Reviewed xray of the hip w/ patient. NAD, but continued pain in the left hip. States some relief when taking muscle relaxer at home.  On further talking to significant other, he states that she is still able to ambulate but has trouble due to pain. 1924 IMPRESSION:     No acute findings, no pulmonary arterial embolism or pulmonary infiltrate/consolidation.                 Workstation performed: [id number]   1950 IMPRESSION:     Healing the left greater trochanteric fracture with stable hardware. 7.5 x 2.8 x 7.4 cm hematoma in the lateral aspect of the left greater trochanter        Workstation performed: [id number]                             SBIRT 20yo+    Flowsheet Row Most Recent Value   Initial Alcohol Screen: US AUDIT-C     1. How often do you have a drink containing alcohol? 0 Filed at: 09/05/2023 1443   2. How many drinks containing alcohol do you have on a typical day you are drinking? 0 Filed at: 09/05/2023 1443   3b. FEMALE Any Age, or MALE 65+: How often do you have 4 or more drinks on one occassion? 0 Filed at: 09/05/2023 1443   Audit-C Score 0 Filed at: 09/05/2023 1443   ROSSY: How many times in the past year have you. .. Used an illegal drug or used a prescription medication for non-medical reasons? Never Filed at: 09/05/2023 1443          Wells' Criteria for PE    Flowsheet Row Most Recent Value   Wells' Criteria for PE    Clinical signs and symptoms of DVT 0 Filed at: 09/05/2023 1503   PE is primary diagnosis or equally likely 3 Filed at: 09/05/2023 1503   HR >100 1.5 Filed at: 09/05/2023 1503   Immobilization at least 3 days or Surgery in the previous 4 weeks 1.5 Filed at: 09/05/2023 1503   Previous, objectively diagnosed PE or DVT 0 Filed at: 09/05/2023 1503   Hemoptysis 0 Filed at: 09/05/2023 1503   Malignancy with treatment within 6 months or palliative 0 Filed at: 09/05/2023 1503   Wells' Criteria Total 6 Filed at: 09/05/2023 1503            Medical Decision Making  80-year-old female presents with left hip/femur pain after surgery.   Differential includes but not limited to VTE, postsurgical complication, femur fracture, hip fracture    Tylenol for pain.  Plain films of the head were taken and compared to previous plain films after hardware placement with no noted bony abnormalities, or abnormalities in hardware. CT of the hip notable for hematoma, otherwise no bony abnormalities or issues with hardware. CT PE study for persistent tachycardia, hypoxia, recent surgery and immobilization due to hip pain. CT PE study negative for PE. Patient discharged home to self-care. Tylenol and warm compresses for pain. Continue physical activity as tolerated. Follow-up with orthopedics. Follow-up with PCP regarding tachycardia and persistent hypoxia likely secondary to history of COPD. Strict return precautions for signs and symptoms suggestive of VTE, continued or worsening symptoms, fracture, hardware malfunction. Patient understanding and agreement with plan. Amount and/or Complexity of Data Reviewed  Labs: ordered. Radiology: ordered and independent interpretation performed. Risk  OTC drugs. Prescription drug management. Disposition  Final diagnoses:   Hematoma   Left hip pain     Time reflects when diagnosis was documented in both MDM as applicable and the Disposition within this note     Time User Action Codes Description Comment    9/5/2023  7:51 PM Jair Brush Add Shagufta. 8XXA] Hematoma     9/5/2023  7:51 PM Bard, Phoebe Galeazzi Add [M25.552] Left hip pain       ED Disposition     ED Disposition   Discharge    Condition   Stable    Date/Time   Tue Sep 5, 2023  7:51 PM    Comment   Maira Fritz discharge to home/self care. Follow-up Information     Follow up With Specialties Details Why Contact Info Additional Information    Sobeida Vázquez MD Internal Medicine Schedule an appointment as soon as possible for a visit  As needed [id number] [phone] [facility]. [address]  [address] [place].  [place] [id number] [place] [address] Emergency Department Emergency Medicine Go to  If symptoms worsen [address] [facility],[place] [address] [address]  Emergency Department, [facility] ([place]), [place], [address] [facility] [place] ([place] Orthopedic Surgery Schedule an appointment as soon as possible for a visit   [address] [facility] [address] [facility] [address] [place] [facility] [address] [facility] [place] ([place]), [address] [place] [address], [address] [place], [place], [address] [place]          Discharge Medication List as of 9/5/2023  7:53 PM      CONTINUE these medications which have NOT CHANGED    Details   Advair Diskus 500-50 MCG/ACT inhaler Starting Wed 7/26/2023, Historical Med      albuterol (PROVENTIL HFA,VENTOLIN HFA) 90 mcg/act inhaler inhale 2 puffs by mouth and INTO THE LUNGS four times a day if needed, Historical Med      azithromycin (ZITHROMAX) 250 mg tablet take 2 tablets by mouth TODAY then take 1 tablet DAILY FOR 4 DAYS, Historical Med      budesonide (PULMICORT) 0.5 mg/2 mL nebulizer solution Take 2 mL (0.5 mg total) by nebulization 2 (two) times a day Rinse mouth after use., Starting Thu 7/20/2023, Until Tue 8/29/2023, Normal      busPIRone (BUSPAR) 15 mg tablet Take 15 mg by mouth 2 (two) times a day, Starting Wed 6/14/2023, Historical Med      ipratropium (ATROVENT) 0.02 % nebulizer solution Take 2.5 mL (0.5 mg total) by nebulization 3 (three) times a day, Starting Thu 7/20/2023, Until Tue 8/29/2023, Normal      metFORMIN (GLUCOPHAGE) 500 mg tablet Take 500 mg by mouth daily Take with food, Starting Wed 7/26/2023, Historical Med      methocarbamol (ROBAXIN) 500 mg tablet Take 1 tablet (500 mg total) by mouth every 6 (six) hours as needed for muscle spasms for up to 14 days, Starting Thu 7/20/2023, Until Tue 8/29/2023 at 2359, Normal      methylPREDNISolone 4 MG tablet therapy pack use as directed FOLLOW DIRECTIONS ON BACK OF FOIL PACK, Historical Med      nicotine (NICODERM CQ) 7 mg/24hr TD 24 hr patch Place 1 patch on the skin over 24 hours daily Do not start before July 21, 2023., Starting Fri 7/21/2023, Normal      rosuvastatin (CRESTOR) 10 MG tablet Take 10 mg by mouth daily, Starting Wed 6/14/2023, Historical Med      Spiriva Respimat 2.5 MCG/ACT AERS inhaler Starting Fri 7/28/2023, Historical Med               PDMP Review     None           ED Provider  Attending physically available and evaluated [facility]. I managed the patient along with the ED Attending.     Electronically Signed by         Mariajose Carballo MD  09/05/23 5017

## 2023-09-05 NOTE — DISCHARGE INSTRUCTIONS
-Has follow-up with orthopedics for hematoma and left hip pain. -Tylenol and ibuprofen for pain as needed. Hot/cold compresses as needed for pain.  -Please return to the emergency department for continued or worsening symptoms.

## 2023-09-05 NOTE — TELEPHONE ENCOUNTER
Caller: Zaki Hadley ()     Doctor: Grant Banks      Reason for call:Status Post  Insertion Nail Im Femur Antegrade (trochanteric) - Left on 7/15/2023      Patient stood up Saturday night to go to bathroom and when she reached for walker she screamed. Something is wrong.   Patient is unable to walk.      No available appointments until Thursday.         Call back#: [phone]

## 2023-09-05 NOTE — TELEPHONE ENCOUNTER
Advised pt to [facility] ED; if unable to safely get pt into car call 911. Pt and  understood. Reason for Disposition  • Sounds like a serious complication to the triager    Answer Assessment - Initial Assessment Questions  1. SYMPTOM: "What's the main symptom you're concerned about?" (e.g., pain, fever, vomiting)      Pt unable to walk and bear weight on left leg after going to the bathroom 9/2  2. ONSET: "When did symptoms  start?"      9/2/23  3. SURGERY: "What surgery was performed?"      Insertion nail IM L femur antegrade  4. DATE of SURGERY: "When was surgery performed?"       7/15/23  6. PAIN: "Is there any pain?" If Yes, ask: "How bad is it?"  (Scale 1-10; or mild, moderate, severe)      7-8/10 when standing/stretching. Feels comfortable laying down in bed  7. FEVER: "Do you have a fever?" If Yes, ask: "What is your temperature, how was it measured, and when did it start?"      no  8. VOMITING: "Is there any vomiting?" If yes, ask: "How many times?"      no  9. BLEEDING: "Is there any bleeding?" If Yes, ask: "How much?" and "Where?"      no  10.  OTHER SYMPTOMS: "Do you have any other symptoms?" (e.g., drainage from wound, painful urination, constipation)       new Black and blue neil on buttox pt  noticed    Protocols used: POST-OP SYMPTOMS AND QUESTIONS-ADULT-[place]

## 2023-09-05 NOTE — TELEPHONE ENCOUNTER
Caller: Cari Alecia ()    Doctor: Marko Brooke     Reason for call:Status Post  Insertion Nail Im Femur Antegrade (trochanteric) - Left on 7/15/2023     Patient stood up Saturday night to go to bathroom and when she reached for walker she screamed. Something is wrong. Patient is unable to walk. No available appointments until Thursday.        Call back#: [phone]

## 2023-09-06 LAB
ATRIAL RATE: 99 BPM
P AXIS: 85 DEGREES
PR INTERVAL: 132 MS
QRS AXIS: 94 DEGREES
QRSD INTERVAL: 78 MS
QT INTERVAL: 346 MS
QTC INTERVAL: 444 MS
T WAVE AXIS: 50 DEGREES
VENTRICULAR RATE: 99 BPM

## 2023-09-06 PROCEDURE — 93010 ELECTROCARDIOGRAM REPORT: CPT | Performed by: INTERNAL MEDICINE

## 2023-09-12 NOTE — ED ATTENDING ATTESTATION
9/5/2023  ITracy MD, saw and evaluated the patient. I have discussed the patient with the resident/non-physician practitioner and agree with the resident's/non-physician practitioner's findings, Plan of Care, and MDM as documented in the resident's/non-physician practitioner's note, except where noted. All available labs and Radiology studies were reviewed. I was present for key portions of any procedure(s) performed by the resident/non-physician practitioner and I was immediately available to provide assistance. At this point I agree with the current assessment done in the Emergency Department. I have conducted an independent evaluation of this patient a history and physical is as follows: patient with left hip pain. Hx recent trochanteric fx with femoral nail. Patient states that she was getting up from a chair using walker and felt left hip pain. Pain with weight bearing. Per  is able to ambulated but notes limp. Surgery was 6 weeks ago. Has chronic foot drop. No new weakness or numbness. Left hip with rom without significant pain. Left knee good rom. Some ecchymosis noted just below iliac crest on left. Plan: xray, possible CT.      ED Course         Critical Care Time  Procedures

## 2023-09-20 ENCOUNTER — TELEPHONE (OUTPATIENT)
Dept: NEUROLOGY | Facility: CLINIC | Age: 68
End: 2023-09-20

## 2023-09-20 NOTE — TELEPHONE ENCOUNTER
Shweta from [address] [place] [address] called with mingo about a test for pt. She needs test clarification please.    CB: [phone]

## 2023-09-21 NOTE — TELEPHONE ENCOUNTER
Called Shweta back at [facility], no answer went to . Left detailed message to call Nurse line option 2 and leave details on which test she is seeking clarification for.

## 2023-09-21 NOTE — TELEPHONE ENCOUNTER
Daryl,     [address] from [address] [place] [address] calling again with questions about a test for pt. She needs test clarification please. CB: [phone]     Patient will have testings done next week and they need a call back for clarification on lab test     I did explain turn around time but she insisted it's important to call her back as soon as possible.

## 2023-09-21 NOTE — TELEPHONE ENCOUNTER
Shweta from [address] [place] [address] called again about the test patient needs to get done. She needs clarification for the lab test before next week when patient is getting the test done.

## 2023-09-22 LAB
ALBUMIN SERPL ELPH-MCNC: 4.2 G/DL (ref 3.8–4.8)
ALBUMIN SERPL-MCNC: 4.3 G/DL (ref 3.6–5.1)
ALBUMIN/GLOB SERPL: 1.7 (CALC) (ref 1–2.5)
ALP SERPL-CCNC: 94 U/L (ref 37–153)
ALPHA1 GLOB SERPL ELPH-MCNC: 0.3 G/DL (ref 0.2–0.3)
ALPHA2 GLOB SERPL ELPH-MCNC: 0.7 G/DL (ref 0.5–0.9)
ALT SERPL-CCNC: 23 U/L (ref 6–29)
ANA SER QL IF: NEGATIVE
AST SERPL-CCNC: 20 U/L (ref 10–35)
BETA1 GLOB SERPL ELPH-MCNC: 0.4 G/DL (ref 0.4–0.6)
BETA2 GLOB SERPL ELPH-MCNC: 0.3 G/DL (ref 0.2–0.5)
BILIRUB SERPL-MCNC: 0.5 MG/DL (ref 0.2–1.2)
BUN SERPL-MCNC: 23 MG/DL (ref 7–25)
BUN/CREAT SERPL: 47 (CALC) (ref 6–22)
CALCIUM SERPL-MCNC: 9.7 MG/DL (ref 8.6–10.4)
CHLORIDE SERPL-SCNC: 100 MMOL/L (ref 98–110)
CK SERPL-CCNC: 148 U/L (ref 29–143)
CO2 SERPL-SCNC: 31 MMOL/L (ref 20–32)
CREAT SERPL-MCNC: 0.49 MG/DL (ref 0.5–1.05)
ERYTHROCYTE [SEDIMENTATION RATE] IN BLOOD BY WESTERGREN METHOD: 9 MM/H
GAMMA GLOB SERPL ELPH-MCNC: 0.8 G/DL (ref 0.8–1.7)
GFR/BSA.PRED SERPLBLD CYS-BASED-ARV: 104 ML/MIN/1.73M2
GLOBULIN SER CALC-MCNC: 2.5 G/DL (CALC) (ref 1.9–3.7)
GLUCOSE SERPL-MCNC: 89 MG/DL (ref 65–99)
M PROTEIN 1 SERPL ELPH-MCNC: 0.1 G/DL
POTASSIUM SERPL-SCNC: 3.9 MMOL/L (ref 3.5–5.3)
PROT SERPL-MCNC: 6.8 G/DL (ref 6.1–8.1)
PROT SERPL-MCNC: 6.8 G/DL (ref 6.1–8.1)
SODIUM SERPL-SCNC: 140 MMOL/L (ref 135–146)
TSH SERPL-ACNC: 2.42 MIU/L (ref 0.4–4.5)
VIT B12 SERPL-MCNC: 969 PG/ML (ref 200–1100)

## 2023-09-25 ENCOUNTER — OFFICE VISIT (OUTPATIENT)
Dept: OBGYN CLINIC | Facility: CLINIC | Age: 68
End: 2023-09-25
Payer: OTHER MISCELLANEOUS

## 2023-09-25 ENCOUNTER — APPOINTMENT (OUTPATIENT)
Dept: RADIOLOGY | Facility: AMBULARY SURGERY CENTER | Age: 68
End: 2023-09-25
Attending: ORTHOPAEDIC SURGERY
Payer: COMMERCIAL

## 2023-09-25 VITALS — WEIGHT: 106 LBS | HEIGHT: 62 IN | BODY MASS INDEX: 19.51 KG/M2

## 2023-09-25 DIAGNOSIS — S72.115S CLOSED NONDISPLACED FRACTURE OF GREATER TROCHANTER OF LEFT FEMUR, SEQUELA: Primary | ICD-10-CM

## 2023-09-25 DIAGNOSIS — S72.115A CLOSED NONDISPLACED FRACTURE OF GREATER TROCHANTER OF LEFT FEMUR, INITIAL ENCOUNTER (HCC): ICD-10-CM

## 2023-09-25 DIAGNOSIS — Z87.81 S/P ORIF (OPEN REDUCTION INTERNAL FIXATION) FRACTURE: ICD-10-CM

## 2023-09-25 DIAGNOSIS — Z98.890 S/P ORIF (OPEN REDUCTION INTERNAL FIXATION) FRACTURE: ICD-10-CM

## 2023-09-25 DIAGNOSIS — S52.515D NONDISPLACED FRACTURE OF LEFT RADIAL STYLOID PROCESS, SUBSEQUENT ENCOUNTER FOR CLOSED FRACTURE WITH ROUTINE HEALING: ICD-10-CM

## 2023-09-25 PROCEDURE — 99212 OFFICE O/P EST SF 10 MIN: CPT | Performed by: ORTHOPAEDIC SURGERY

## 2023-09-25 PROCEDURE — 73110 X-RAY EXAM OF WRIST: CPT

## 2023-09-25 PROCEDURE — 73502 X-RAY EXAM HIP UNI 2-3 VIEWS: CPT

## 2023-09-25 RX ORDER — AMOXICILLIN AND CLAVULANATE POTASSIUM 875; 125 MG/1; MG/1
1 TABLET, FILM COATED ORAL EVERY 12 HOURS
COMMUNITY

## 2023-09-25 RX ORDER — CEFPROZIL 250 MG/1
1 TABLET, FILM COATED ORAL EVERY 12 HOURS
COMMUNITY

## 2023-09-25 RX ORDER — AZELASTINE 1 MG/ML
1 SPRAY, METERED NASAL 2 TIMES DAILY
COMMUNITY
Start: 2023-09-12

## 2023-09-25 RX ORDER — VERAPAMIL HYDROCHLORIDE 120 MG/1
120 CAPSULE, EXTENDED RELEASE ORAL DAILY
COMMUNITY
Start: 2023-09-12

## 2023-09-25 RX ORDER — LEVALBUTEROL INHALATION SOLUTION 1.25 MG/3ML
SOLUTION RESPIRATORY (INHALATION)
COMMUNITY

## 2023-09-25 NOTE — TELEPHONE ENCOUNTER
Patient  stop at the [facility] office asking once again for clarification of the lab test that needs to be done. Note with the test order is scanned into the media.  Please advise

## 2023-09-25 NOTE — LETTER
September 25, 2023     Patient: Fernando iGbson  YOB: 1955  Date of Visit: 9/25/2023      To Whom it May Concern:    Fernando Gibson is under my professional care. Pat Riojas was seen in my office on 9/25/2023. Pat Riojas will need accomodation for parking with in 100 feet of the front door. She needs to be allowed to use the walker. If you have any questions or concerns, please don't hesitate to call.          Sincerely,          Ld Hussein,         CC: No Recipients

## 2023-09-25 NOTE — LETTER
September 25, 2023     Patient: Francesco Kennedy  YOB: 1955  Date of Visit: 9/25/2023      To Whom it May Concern:    Francesco Kennedy is under my professional care. Warren Garcia was seen in my office on 9/25/2023. Warren Garcia can not work more then 4 hours with out an hour break. She must be on sedentary duty. If you have any questions or concerns, please don't hesitate to call.          Sincerely,          Yodit Kinney DO        CC: No Recipients

## 2023-09-25 NOTE — TELEPHONE ENCOUNTER
Received VM transcription from 8:29 AM:    Hi, my name is Anibal Dutta calling on behalf of my wife Hilary Patel. She is scheduled to have some blood work done for some tests from you guys and when we went to request diagnostics, there were questions about what type of test that they wanted to draw for. And that was last week and we still haven't heard anything back. Could you please give me a call at [phone]. As soon as possible. She needs to have this done before test next at the end of the week. Thank you.  ------------------------------------------    Called [facility], no answer. LVM requesting call back for details on which test is in need of clarification. Called pt's  Anibal, no answer. LVM informing him that we are awaiting call back from [facility] with details of what test needs clarification.  Advised pt's  that we will call them once we hear back from [facility].

## 2023-09-25 NOTE — PROGRESS NOTES
Assessment:   Status Post  Insertion Nail Im Femur Antegrade (trochanteric) - Left on 7/15/2023   Nondisplaced fracture of left radial styloid    Plan:   Weight bearing: as tolerated, work with PT to transition from walker to cane  Discontinue use of wrist brace, referral to hand therapy       Follow Up:  3  month(s)    To Do Next Visit:  X-rays of the  left  hip      CHIEF COMPLAINT:  Chief Complaint   Patient presents with   • Left Hip - Follow-up         SUBJECTIVE:  Cammy Jimenez is a 79y.o. year old female who presents for follow up after Insertion Nail Im Femur Antegrade (trochanteric) - Left. Today patient has pain in her left hip with ambulation. She continues to use a walker for ambulation assistance. She is concerned about her equilibrium stating she does not have balance. History of foot drop and lower extremity weakness. PHYSICAL EXAMINATION:    MUSCULOSKELETAL EXAMINATION:  General: Alert and oriented x 3, WNWD, NAD  Incision: Clean, dry, intact, healing well  Range of Motion: As expected  Neurovascular status: Sensation intact to light touch     Left wrist  NTTP radial styloid   Full composite fist  Sensation intact  Brisk capillary refill       STUDIES REVIEWED:  I have personally reviewed pertinent films in PACS.       PROCEDURES PERFORMED:  Procedures  No Procedures performed today

## 2023-09-27 NOTE — TELEPHONE ENCOUNTER
Recd vm 9/26 taken off 9/26     this is Ethel calling from [facility], calling in regards to a patient Dina SELLERS L A FILEMON. Date of birth is [birth year]. I had called 4 times last week trying to get clarification on a test for a paraneoplastic panel. It was listed under a miscellaneous lab test on her paperwork. I had a return call from [facility], apparently last week that I am just now getting. So if somebody can return my call, my call back number here is [phone]. We actually have 9 different panels for that, so I need to know which specific panel the doctor wants.   _________    I spoke to Ethel; she is faxing the details regarding the  9 panels available for paraneoplastic testing; requesting cb once received and reviewed by Dr. Orin Mann to advise which is most appropriate panel.

## 2023-09-27 NOTE — TELEPHONE ENCOUNTER
LV informing labs to be drawn are Paraneoplastic, Autoantibody Evaluation, Serum and offered appt on 10/10 at 12pm in [facility].

## 2023-09-27 NOTE — TELEPHONE ENCOUNTER
LV on husbands line informing them I did go ahead and schedule the f/u appt in [facility] with Angie Bill on 10/10 at 12pm.

## 2023-09-27 NOTE — TELEPHONE ENCOUNTER
Pt called in to say that 10/10 @ 12:00 works. Unsure how to schedule this appt. Please assist.   And please call pt back on [phone] which is her husbands number.    Thank you

## 2023-09-29 ENCOUNTER — HOSPITAL ENCOUNTER (OUTPATIENT)
Dept: MRI IMAGING | Facility: HOSPITAL | Age: 68
Discharge: HOME | End: 2023-09-29
Attending: PSYCHIATRY & NEUROLOGY
Payer: COMMERCIAL

## 2023-09-29 DIAGNOSIS — M62.81 GENERALIZED MUSCLE WEAKNESS: ICD-10-CM

## 2023-09-29 PROCEDURE — A9585 GADOBUTROL INJECTION: HCPCS | Performed by: PSYCHIATRY & NEUROLOGY

## 2023-09-29 PROCEDURE — 72156 MRI NECK SPINE W/O & W/DYE: CPT

## 2023-09-29 PROCEDURE — G1004 CDSM NDSC: HCPCS

## 2023-09-29 PROCEDURE — 70553 MRI BRAIN STEM W/O & W/DYE: CPT

## 2023-09-29 RX ORDER — GADOBUTROL 604.72 MG/ML
6 INJECTION INTRAVENOUS
Status: COMPLETED | OUTPATIENT
Start: 2023-09-29 | End: 2023-09-29

## 2023-09-29 RX ADMIN — GADOBUTROL 6 ML: 604.72 INJECTION INTRAVENOUS at 19:36

## 2023-09-29 NOTE — TELEPHONE ENCOUNTER
Called Carter back, spoke with Ethel. Stated she did get clarification on lab order. Nothing further needed at this time.

## 2023-09-30 LAB — ACHR BIND AB SER-SCNC: <0.3 NMOL/L

## 2023-10-04 ENCOUNTER — TELEPHONE (OUTPATIENT)
Dept: NEUROLOGY | Facility: CLINIC | Age: 68
End: 2023-10-04

## 2023-10-06 LAB
ACHR BIND AB SER-SCNC: <0.3 NMOL/L
AMPHIPHYSIN AB TITR SER: NEGATIVE {TITER}
CV2 AB SERPL QL: NEGATIVE
HU1 AB SER QL: NEGATIVE
HU2 AB SER QL IF: NEGATIVE
HU3 AB SER QL: NEGATIVE
PCA-2 AB SER QL IF: NEGATIVE
SL AMB AGNA/SOX1 AB, IFA: NEGATIVE
SL AMB PCA TR (DNER) AB, IFA: NEGATIVE
SL AMB PCA1 (YO) AB, IFA: NEGATIVE
SL AMB TISSUE IFA OBSERVATION(S): NORMAL
STRIA MUS AB SER QL: NEGATIVE
VGCC AB SER-SCNC: <30 PMOL/L
VGCC-N BIND AB SER-SCNC: <54 PMOL/L

## 2023-10-06 NOTE — TELEPHONE ENCOUNTER
Recd  10/4 taken off 10/6    You Gay Sandoval calling back. You told me to call. my appointment at 12 o'clock on October 10th. They told her to give me to give you a call.     [phone]

## 2023-10-10 ENCOUNTER — TELEPHONE (OUTPATIENT)
Dept: NEUROLOGY | Facility: CLINIC | Age: 68
End: 2023-10-10

## 2023-10-10 ENCOUNTER — OFFICE VISIT (OUTPATIENT)
Dept: NEUROLOGY | Facility: CLINIC | Age: 68
End: 2023-10-10
Payer: COMMERCIAL

## 2023-10-10 VITALS
OXYGEN SATURATION: 92 % | WEIGHT: 98 LBS | DIASTOLIC BLOOD PRESSURE: 82 MMHG | BODY MASS INDEX: 18.03 KG/M2 | HEIGHT: 62 IN | HEART RATE: 104 BPM | SYSTOLIC BLOOD PRESSURE: 140 MMHG | TEMPERATURE: 97.8 F

## 2023-10-10 DIAGNOSIS — M62.81 GENERALIZED MUSCLE WEAKNESS: ICD-10-CM

## 2023-10-10 DIAGNOSIS — G12.21 ALS (AMYOTROPHIC LATERAL SCLEROSIS) (HCC): Primary | ICD-10-CM

## 2023-10-10 PROCEDURE — 99215 OFFICE O/P EST HI 40 MIN: CPT | Performed by: PSYCHIATRY & NEUROLOGY

## 2023-10-10 RX ORDER — RILUZOLE 50 MG/1
50 TABLET, FILM COATED ORAL EVERY 12 HOURS
Qty: 60 TABLET | Refills: 1 | Status: SHIPPED | OUTPATIENT
Start: 2023-10-10

## 2023-10-10 NOTE — TELEPHONE ENCOUNTER
MSW faxed patient's demographics to Manohar Cruz,  with the [facility], at [phone]. Successful fax notice received. MSW will be available to patient/family as needed.

## 2023-10-10 NOTE — PROGRESS NOTES
Patient ID: Chadd Obando is a 79 y.o. female. Assessment/Plan:    ALS (amyotrophic lateral sclerosis) ([address])  I believe this patient has amyotrophic lateral sclerosis. This would be supported by the asymmetric (left more than right), painless, progressive muscle weakness, with lower motor neuron signs in the form of fasciculations, weakness and atrophy at the cervical and lumbar levels, and upper motor neuron signs in the form of brisk reflexes in the upper extremities, further supported by her electrodiagnostic studies performed at [facility], and lack of any structural findings on her imaging and negative blood work for any inflammatory or autoimmune etiologies. At this time, she meets the [place] criteria for clinically probable, laboratory suppported ALS, and meets the conventional criteria for ALS. Today's visit was spent entirely in counseling and discussion with the patient and her  and lasted more than 55 minutes. I explained to them that ALS is a progressive, degenerative neuromuscular disorder, for which there is no known cure. We talked about riluzole and the efficacy of this medication in prolonging the lifespan in some individuals with ALS compared to those treated with placebo. I prescribed 50 mg b.i.d. .  We also discussed the other FDA approved medications for ALS including edaravone and Relyvrio, and symptomatic management of ALS. She does not have much in the way of pain that needs to be treated, she is sleeping well, does not have any speech or swallowing difficulty, shortness of breath. She asked about continuing physical therapy, which she has been finding it helpful for her functional independence, that she may continue. They do have a transport chair at home, encouraged them to start using gait for outside the house, at home strongly encouraged her to continue using the walker at all times to avoid any falls.   Overall, her symptoms may have started about a year ago, but she clearly has had a significant decline in the last 6 months, we will need to continue to monitor her closely. I believe this patient would benefit greatly from follow-up in the multidisciplinary ALS clinic, at [facility], she has agreed to do that and given me permission to reach out to the team at [address] [place] [address] at [facility] for future care. She and her  had number of questions today, that were answered to their satisfaction. They are still trying to understand the diagnosis, I have given them some resources to review, they understand they can contact me if they have any questions or concerns. Diagnoses and all orders for this visit:    ALS (amyotrophic lateral sclerosis) ([address])  -     Ambulatory Referral to Neurology; Future  -     riluzole (RILUTEK) 50 MG tablet; Take 1 tablet (50 mg total) by mouth every 12 (twelve) hours    Generalized muscle weakness    Other orders  -     Pseudoephedrine-Acetaminophen (SM NON-ASPRIN SINUS PO); Take 325 mg by mouth A day           Subjective:    HPI    I had the pleasure of seeing your patient in neurology clinic for neuromuscular follow-up. As you know, patient is a 80-year-old woman, who was referred to us for progressive weakness for possible motor neuron disease. As you know, patient has had some weakness in the lower extremities which may have started about a year ago, however symptoms significantly got worse after a fall in May 2023, patient was walking down hill towards the car, when her knee gave out and she fell down. She does not recall having any specific muscle weakness, however noted a foot drop on the left within the next week or 2.   Since then, she has had progressive weakness in the whole left lower extremity, has a complete foot drop on the left, noticing weakness in the right lower extremity as well, has a foot drop on the right as well, no weakness in the upper extremities, but does have muscle twitching, did fracture her left hip and the left wrist with one of the falls. Has been using her AFO on the left, and currently pursuing physical therapy. Since she was last evaluated by me in August, reports further weakness in both lower extremities, has had near falls because of the left lower extremity giving out on her. No speech or swallowing difficulty, does get some shortness of breath with exertion, but not a consistent feature, and no visual changes. Does report some numbness in the feet, worse on the left compared to the right with some edema in the left foot. No change in weight. Since she was last evaluated on 8/29/23, patient has some additional blood work including sed rate, TSH, B12, SINAN, SPEP, paraneoplastic panel which were all negative. CK was 148. 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 A few nonspecific white matter hyperintensities were noted, not in the regions that would be consistent with a demyelinating disease such as MS. MRI cervical spine diffuse degenerative changes, with mild to central canal stenosis at C5-6, mild to moderate bilateral neuroforaminal narrowing at multiple levels, no cord signal abnormality, no contrast-enhancement.     Further prior work-up as noted below:    MRI Left hip:  7/14/23: Left greater trochanteric fracture seen on prior study does also extend incompletely through the intertrochanteric region, reaching two thirds of the way to the lesser trochanteric cortex .     MRI L spine, 7/14/23: Images were personally reviewed by me as a part of this evaluation today, mild degenerative changes with facet arthropathy, minimal canal stenosis and foraminal narrowing noted at L3-4, minimal degenerative changes at L4-5, without any significant canal stenosis or neuroforaminal narrowing, no abnormal enhancement.     Labs:8/1/2305Uce8i 6.1. CBC normal.      CT head August 28, 2023, images personally reviewed by me as a part of this evaluation with patient and her , no acute intracranial abnormality. Mild hypodense structure lateral to the right anterior temporal horn was reported, likely a prominent perivascular space, or possible neuroglial cyst.     EMG at [facility] last week (aug 2023), raw data personally viewed by me as a part of this evaluation today, Study was performed of right upper and both lower extremities, all the sensory responses were normal, with normal latencies, amplitudes and conduction velocities including right median, ulnar, radial, bilateral sural and superficial peroneal sensory responses. Left peroneal motor response was absent, right peroneal motor response from extensor digitorum brevis muscle showed markedly reduced amplitude with diffusely slow conduction velocity, however further focal slowing of conduction velocity was noted across the fibular head. Right median motor study showed low amplitude with normal latency and conduction velocity. Left tibial motor response was absent. Right tibial motor response was reduced with slightly prolonged latency and normal conduction velocity. Right ulnar motor study showed reduced amplitude with normal latency and normal conduction velocity. Right median and ulnar F responses were normal, H waves were slightly prolonged however symmetric bilaterally.   Needle examination showed fibrillation potentials and positive sharp waves in almost all the tested bilateral lower extremity muscles, with reduced recruitment of Motor unit action potentials potentials that were of normal to increased amplitude and duration, in addition spontaneous activity was noted in the right FDI, pronator, biceps and triceps with fasciculation potentials with chronic neurogenic Motor unit action potentials action potentials. Right masseter, temporalis and orbicularis oculi muscles were normal, however thoracic paraspinal muscles reported abnormal spontaneous activity. 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 The following portions of the patient's history were reviewed and updated as appropriate: allergies, current medications, past family history, past medical history, past social history, past surgical history and problem list.         Objective:    Blood pressure 140/82, pulse 104, temperature 97.8 °F (36.6 °C), temperature source Temporal, height 5' 2" (1.575 m), weight 44.5 kg (98 lb), SpO2 92 %. Physical Exam  General exam, patient was not in any acute distress  HEENT was unremarkable  Extremities revealed very minimal edema in the left foot. Neurological Exam  Neurologically, patient was awake and alert. Speech was fluent without any dysarthria. Cranial examination did not reveal any ptosis, normal extraocular movements, normal strength of eye closure muscles, no facial asymmetry or weakness, tongue was midline without atrophy or fasciculations. On motor exam, she had full strength in neck flexor and extensor muscles. Fasciculations were noted on her previous basis in both upper extremities in the bicep/tricep region, as well as in the forearms, this could not be visualized today as patient had a full sleeve shirt on. In the upper extremities, strength was 5/5 in the right upper extremity, on the left, deltoids were graded as 4, biceps 4, triceps 4+, wrist extensors 5, wrist flexors 4+, interossei were 5. In the lower extremities, strength was graded as follows (right/left): Hip flexors 4/4 -, knee extensors 4+/3, knee flexors 4/3, ankle dorsiflexors 3/0.   Reflexes were 3 in the upper extremities, left worse than the right, knees were 2, worse on the left compared to the right, ankles were 0 bilaterally. Sensation was minimally reduced to temperature up to the ankles, worse on the left compared to the right, vibration was 8 to 9 seconds at the toes. She needed help to in order to get up from a sitting position, and ambulated with the help of a walker. ROS:  I reviewed the below ROS and what is mentioned in HPI, the remainder of ROS was negative. Review of Systems   Constitutional: Negative for appetite change, fatigue and fever. HENT: Negative. Negative for hearing loss, tinnitus, trouble swallowing and voice change. Eyes: Negative. Negative for photophobia, pain and visual disturbance. Respiratory: Negative. Negative for shortness of breath. Cardiovascular: Negative. Negative for palpitations. Gastrointestinal: Negative. Negative for nausea and vomiting. Endocrine: Negative. Negative for cold intolerance. Genitourinary: Negative. Negative for dysuria, frequency and urgency. Musculoskeletal: Negative for back pain, gait problem, myalgias and neck pain. Skin: Negative. Negative for rash. Allergic/Immunologic: Negative. Neurological: Positive for weakness (worse). Negative for dizziness, tremors, seizures, syncope, facial asymmetry, speech difficulty, light-headedness, numbness and headaches. Hematological: Negative. Does not bruise/bleed easily. Psychiatric/Behavioral: Negative. Negative for confusion, hallucinations and sleep disturbance. All other systems reviewed and are negative.

## 2023-10-10 NOTE — ASSESSMENT & PLAN NOTE
I believe this patient has amyotrophic lateral sclerosis. This would be supported by the asymmetric (left more than right), painless, progressive muscle weakness, with lower motor neuron signs in the form of fasciculations, weakness and atrophy at the cervical and lumbar levels, and upper motor neuron signs in the form of brisk reflexes in the upper extremities, further supported by her electrodiagnostic studies performed at [facility], and lack of any structural findings on her imaging and negative blood work for any inflammatory or autoimmune etiologies. At this time, she meets the [place] criteria for clinically probable, laboratory suppported ALS, and meets the conventional criteria for ALS. Today's visit was spent entirely in counseling and discussion with the patient and her  and lasted more than 55 minutes. I explained to them that ALS is a progressive, degenerative neuromuscular disorder, for which there is no known cure. We talked about riluzole and the efficacy of this medication in prolonging the lifespan in some individuals with ALS compared to those treated with placebo. I prescribed 50 mg b.i.d. .  We also discussed the other FDA approved medications for ALS including edaravone and Relyvrio, and symptomatic management of ALS. She does not have much in the way of pain that needs to be treated, she is sleeping well, does not have any speech or swallowing difficulty, shortness of breath. She asked about continuing physical therapy, which she has been finding it helpful for her functional independence, that she may continue. They do have a transport chair at home, encouraged them to start using gait for outside the house, at home strongly encouraged her to continue using the walker at all times to avoid any falls.   Overall, her symptoms may have started about a year ago, but she clearly has had a significant decline in the last 6 months, we will need to continue to monitor her closely. I believe this patient would benefit greatly from follow-up in the multidisciplinary ALS clinic, at [facility], she has agreed to do that and given me permission to reach out to the team at [address] [place] [address] at [facility] for future care. She and her  had number of questions today, that were answered to their satisfaction. They are still trying to understand the diagnosis, I have given them some resources to review, they understand they can contact me if they have any questions or concerns.

## 2023-10-10 NOTE — TELEPHONE ENCOUNTER
----- Message from Don Epperson MD sent at 10/10/2023  3:46 PM EDT -----  Juwan, this is a new ALS pt, can you please send her to [address] [place] [address]. Renee Palomo, can you please fax her note from today to Dr Chante Browne and Patsy Or at [facility], please make sure to write: Attent to Dr. Sanjiv Mckenna, new ALS pt.

## 2023-10-12 LAB
ACHR BIND AB SER-SCNC: <0.3 NMOL/L
AMPHIPHYSIN AB TITR SER: NEGATIVE {TITER}
CV2 AB SERPL QL: NEGATIVE
HU1 AB SER QL: NEGATIVE
HU2 AB SER QL IF: NEGATIVE
HU3 AB SER QL: NEGATIVE
NACHR AB SER-SCNC: <55 PMOL/L
PCA-2 AB SER QL IF: NEGATIVE
SL AMB AGNA/SOX1 AB, IFA: NEGATIVE
SL AMB PCA TR (DNER) AB, IFA: NEGATIVE
SL AMB PCA1 (YO) AB, IFA: NEGATIVE
SL AMB TISSUE IFA OBSERVATION(S): NORMAL
STRIA MUS AB SER QL: NEGATIVE
VGCC AB SER-SCNC: <30 PMOL/L
VGCC-N BIND AB SER-SCNC: <54 PMOL/L
VGKC AB SER-SCNC: <80 PMOL/L

## 2023-12-04 DIAGNOSIS — G12.21 ALS (AMYOTROPHIC LATERAL SCLEROSIS) (HCC): ICD-10-CM

## 2023-12-04 RX ORDER — RILUZOLE 50 MG/1
50 TABLET, FILM COATED ORAL EVERY 12 HOURS
Qty: 60 TABLET | Refills: 1 | Status: SHIPPED | OUTPATIENT
Start: 2023-12-04

## (undated) DEVICE — 3.2MM GUIDE WIRE 400MM

## (undated) DEVICE — SUT VICRYL 2-0 CT-1 27 IN J259H

## (undated) DEVICE — 3M™ DURAPORE™ SURGICAL TAPE 1538-3, 3 INCH X 10 YARD (7,5CM X 9,1M), 4 ROLLS/BOX: Brand: 3M™ DURAPORE™

## (undated) DEVICE — SUT VICRYL 0 CT-1 27 IN J260H

## (undated) DEVICE — STERILE BETHLEHEM ORIF HIP PK: Brand: CARDINAL HEALTH

## (undated) DEVICE — GLOVE INDICATOR PI UNDERGLOVE SZ 8 BLUE

## (undated) DEVICE — PAD CAST 4 IN COTTON NON STERILE

## (undated) DEVICE — ARTHROSCOPY FLOOR MAT

## (undated) DEVICE — GLOVE SRG BIOGEL ECLIPSE 8

## (undated) DEVICE — 4.2MM THREE-FLUTED DRILL BIT QC/330MM/100MM CALIBRATION

## (undated) DEVICE — DRESSING MEPILEX AG BORDER 4 X 4 IN

## (undated) DEVICE — 6617 IOBAN II PATIENT ISOLATION DRAPE 5/BX,4BX/CS: Brand: STERI-DRAPE™ IOBAN™ 2

## (undated) DEVICE — CHLORAPREP HI-LITE 26ML ORANGE